# Patient Record
Sex: MALE | Race: WHITE | Employment: OTHER | URBAN - METROPOLITAN AREA
[De-identification: names, ages, dates, MRNs, and addresses within clinical notes are randomized per-mention and may not be internally consistent; named-entity substitution may affect disease eponyms.]

---

## 2017-01-05 ENCOUNTER — TELEPHONE (OUTPATIENT)
Dept: FAMILY MEDICINE | Facility: CLINIC | Age: 62
End: 2017-01-05

## 2017-01-05 DIAGNOSIS — E03.9 HYPOTHYROIDISM, UNSPECIFIED TYPE: Primary | ICD-10-CM

## 2017-01-06 RX ORDER — LEVOTHYROXINE SODIUM 75 UG/1
TABLET ORAL
Qty: 30 TABLET | Refills: 0 | Status: SHIPPED | OUTPATIENT
Start: 2017-01-06 | End: 2017-01-31

## 2017-01-06 NOTE — TELEPHONE ENCOUNTER
Levothyroxine     Last Written Prescription Date: 12/17/2015  Last Quantity: 90, # refills: 1  Last Office Visit with G, P or [facility] prescribing provider: 01/28/2016        TSH   Date Value Ref Range Status   05/26/2015 6.50* 0.40 - 4.00 mU/L Final     Lilian Carrasquillo MA

## 2017-01-06 NOTE — TELEPHONE ENCOUNTER
Routing refill request to provider for review/approval because:  Labs out of range:  TSH  Labs not current:  TSH  A break in medication  Patient needs to be seen because it has been more than 1 year since last office visit.  Bharti Torres RN

## 2017-01-30 NOTE — PROGRESS NOTES
"  SUBJECTIVE:                                                    Martin Armstrong is a 61 year old male who presents to clinic today for the following health issues:        Depression and Anxiety Follow-Up    Status since last visit: No change    Other associated symptoms:None    Complicating factors:     Significant life event: No     Current substance abuse: None    PHQ-9 SCORE 5/26/2015 6/29/2015 12/17/2015   Total Score 5 10 -   Total Score - - 5     MINA-7 SCORE 5/26/2015 6/29/2015 12/17/2015   Total Score 5 3 -   Total Score - - 6        PHQ-9  English      PHQ-9   Any Language     GAD7       Hypothyroidism Follow-up      Since last visit, patient describes the following symptoms: Weight stable, no hair loss, no skin changes, no constipation, no loose stools       Amount of exercise or physical activity: 2-3 days/week for an average of 15-30 minutes    Problems taking medications regularly: No    Medication side effects: none    Diet: regular (no restrictions)    He is looking at FDC options. Sees outside provider for Suboxone, and is trying to wean off this. Feels life is going fairly well. BP is a bit elevated with first reading. Denies cardiac symptoms.     Due for screening labs.     Problem list and histories reviewed & adjusted, as indicated.  Additional history: as documented    Problem list, Medication list, Allergies, and Medical/Social/Surgical histories reviewed in EPIC and updated as appropriate.    ROS:  Constitutional, neuro, ENT, endocrine, pulmonary, cardiac, gastrointestinal, genitourinary, musculoskeletal, integument and psychiatric systems are negative, except as otherwise noted.    OBJECTIVE:                                                    /80 mmHg  Pulse 86  Temp(Src) 97.5  F (36.4  C) (Temporal)  Resp 14  Ht 5' 9.5\" (1.765 m)  Wt 193 lb 8 oz (87.771 kg)  BMI 28.17 kg/m2  Body mass index is 28.17 kg/(m^2).  GENERAL APPEARANCE: healthy, alert and no distress  EYES: Eyes " grossly normal to inspection and conjunctivae and sclerae normal  HENT: ear canals and TM's normal and nose and mouth without ulcers or lesions  NECK: no adenopathy, no asymmetry, masses, or scars and thyroid normal to palpation  RESP: lungs clear to auscultation - no rales, rhonchi or wheezes  CV: regular rates and rhythm, normal S1 S2, no S3 or S4 and no murmur, click or rub  NEURO: Normal strength and tone, mentation intact and speech normal  PSYCH: mentation appears normal and affect normal/bright         ASSESSMENT/PLAN:                                                        ICD-10-CM    1. Hypothyroidism, unspecified type E03.9 TSH with free T4 reflex     T4 free     levothyroxine (SYNTHROID/LEVOTHROID) 88 MCG tablet     TSH with free T4 reflex     DISCONTINUED: levothyroxine (SYNTHROID/LEVOTHROID) 75 MCG tablet   2. Major depressive disorder, recurrent episode, moderate (H) F33.1 citalopram (CELEXA) 40 MG tablet   3. Advanced directives, counseling/discussion Z71.89    4. Lipid screening Z13.220 Lipid panel reflex to direct LDL   5. Encounter for screening examination for impaired glucose regulation and diabetes mellitus Z13.1 Glucose   6. Need for hepatitis C screening test Z11.59 Hepatitis C antibody   7. Screening for prostate cancer Z12.5 Prostate spec antigen screen   8. Need for prophylactic vaccination and inoculation against influenza Z23 FLU VAC, SPLIT VIRUS IM > 3 YO (QUADRIVALENT) [11930]     Vaccine Administration, Initial [75098]       Continue same medication doses. Discussed BP control and medications Weight loss, and exercise with healthy nutrition discussed.   Routine labs. Discussed HCM.   Future labs  Meds refilled. Discussed seborrheic moles. Return to clinic with any new or worsening symptoms, and as needed.     BETH Cook [facility] ERVIN  Injectable Influenza Immunization Documentation    1.  Is the person to be vaccinated sick today?  No    2. Does the person to be  vaccinated have an allergy to eggs or to a component of the vaccine?  No    3. Has the person to be vaccinated today ever had a serious reaction to influenza vaccine in the past?  No    4. Has the person to be vaccinated ever had Guillain-[place] syndrome?  No     Form completed by Jaquelin Weber CMA ([facility])

## 2017-01-31 ENCOUNTER — OFFICE VISIT (OUTPATIENT)
Dept: FAMILY MEDICINE | Facility: CLINIC | Age: 62
End: 2017-01-31
Payer: COMMERCIAL

## 2017-01-31 VITALS
HEART RATE: 86 BPM | DIASTOLIC BLOOD PRESSURE: 80 MMHG | WEIGHT: 193.5 LBS | BODY MASS INDEX: 27.7 KG/M2 | HEIGHT: 70 IN | TEMPERATURE: 97.5 F | RESPIRATION RATE: 14 BRPM | SYSTOLIC BLOOD PRESSURE: 136 MMHG

## 2017-01-31 DIAGNOSIS — E03.9 HYPOTHYROIDISM, UNSPECIFIED TYPE: Primary | ICD-10-CM

## 2017-01-31 DIAGNOSIS — Z71.89 ADVANCED DIRECTIVES, COUNSELING/DISCUSSION: ICD-10-CM

## 2017-01-31 DIAGNOSIS — Z13.1 ENCOUNTER FOR SCREENING EXAMINATION FOR IMPAIRED GLUCOSE REGULATION AND DIABETES MELLITUS: ICD-10-CM

## 2017-01-31 DIAGNOSIS — Z23 NEED FOR PROPHYLACTIC VACCINATION AND INOCULATION AGAINST INFLUENZA: ICD-10-CM

## 2017-01-31 DIAGNOSIS — Z13.220 LIPID SCREENING: ICD-10-CM

## 2017-01-31 DIAGNOSIS — Z12.5 SCREENING FOR PROSTATE CANCER: ICD-10-CM

## 2017-01-31 DIAGNOSIS — Z11.59 NEED FOR HEPATITIS C SCREENING TEST: ICD-10-CM

## 2017-01-31 DIAGNOSIS — F33.1 MAJOR DEPRESSIVE DISORDER, RECURRENT EPISODE, MODERATE (H): ICD-10-CM

## 2017-01-31 LAB
PSA SERPL-ACNC: 0.22 UG/L (ref 0–4)
T4 FREE SERPL-MCNC: 0.79 NG/DL (ref 0.76–1.46)
TSH SERPL DL<=0.005 MIU/L-ACNC: 9.54 MU/L (ref 0.4–4)

## 2017-01-31 PROCEDURE — 84439 ASSAY OF FREE THYROXINE: CPT | Performed by: NURSE PRACTITIONER

## 2017-01-31 PROCEDURE — 84443 ASSAY THYROID STIM HORMONE: CPT | Performed by: NURSE PRACTITIONER

## 2017-01-31 PROCEDURE — 90686 IIV4 VACC NO PRSV 0.5 ML IM: CPT | Performed by: NURSE PRACTITIONER

## 2017-01-31 PROCEDURE — 86803 HEPATITIS C AB TEST: CPT | Performed by: NURSE PRACTITIONER

## 2017-01-31 PROCEDURE — 36415 COLL VENOUS BLD VENIPUNCTURE: CPT | Performed by: NURSE PRACTITIONER

## 2017-01-31 PROCEDURE — G0103 PSA SCREENING: HCPCS | Performed by: NURSE PRACTITIONER

## 2017-01-31 PROCEDURE — 90471 IMMUNIZATION ADMIN: CPT | Performed by: NURSE PRACTITIONER

## 2017-01-31 PROCEDURE — 99214 OFFICE O/P EST MOD 30 MIN: CPT | Mod: 25 | Performed by: NURSE PRACTITIONER

## 2017-01-31 RX ORDER — BUPRENORPHINE HYDROCHLORIDE AND NALOXONE HYDROCHLORIDE DIHYDRATE 8; 2 MG/1; MG/1
TABLET SUBLINGUAL
COMMUNITY
Start: 2016-12-20

## 2017-01-31 RX ORDER — CITALOPRAM HYDROBROMIDE 40 MG/1
40 TABLET ORAL DAILY
Qty: 90 TABLET | Refills: 3 | Status: SHIPPED | OUTPATIENT
Start: 2017-01-31 | End: 2018-03-05

## 2017-01-31 RX ORDER — LEVOTHYROXINE SODIUM 75 UG/1
75 TABLET ORAL DAILY
Qty: 90 TABLET | Refills: 3 | Status: SHIPPED | OUTPATIENT
Start: 2017-01-31 | End: 2017-02-01 | Stop reason: DRUGHIGH

## 2017-01-31 ASSESSMENT — ANXIETY QUESTIONNAIRES
5. BEING SO RESTLESS THAT IT IS HARD TO SIT STILL: NOT AT ALL
GAD7 TOTAL SCORE: 5
1. FEELING NERVOUS, ANXIOUS, OR ON EDGE: SEVERAL DAYS
6. BECOMING EASILY ANNOYED OR IRRITABLE: SEVERAL DAYS
IF YOU CHECKED OFF ANY PROBLEMS ON THIS QUESTIONNAIRE, HOW DIFFICULT HAVE THESE PROBLEMS MADE IT FOR YOU TO DO YOUR WORK, TAKE CARE OF THINGS AT HOME, OR GET ALONG WITH OTHER PEOPLE: SOMEWHAT DIFFICULT
2. NOT BEING ABLE TO STOP OR CONTROL WORRYING: SEVERAL DAYS
7. FEELING AFRAID AS IF SOMETHING AWFUL MIGHT HAPPEN: SEVERAL DAYS
3. WORRYING TOO MUCH ABOUT DIFFERENT THINGS: SEVERAL DAYS

## 2017-01-31 ASSESSMENT — PATIENT HEALTH QUESTIONNAIRE - PHQ9: 5. POOR APPETITE OR OVEREATING: NOT AT ALL

## 2017-01-31 NOTE — Clinical Note
My Depression Action Plan  Name: Martin Armstrong   Date of Birth 1955  Date: 1/30/2017    My doctor: Nancy Sen   My clinic: [facility]  [address] [place] [address], [address]  [place] [id number] [phone]          GREEN    ZONE   Good Control    What it looks like:     Things are going generally well. You have normal up s and down s. You may even feel depressed from time to time, but bad moods usually last less than a day.   What you need to do:  1. Continue to care for yourself (see self care plan)  2. Check your depression survival kit and update it as needed  3. Follow your physician s recommendations including any medication.  4. Do not stop taking medication unless you consult with your physician first.           YELLOW         ZONE Getting Worse    What it looks like:     Depression is starting to interfere with your life.     It may be hard to get out of bed; you may be starting to isolate yourself from others.    Symptoms of depression are starting to last most all day and this has happened for several days.     You may have suicidal thoughts but they are not constant.   What you need to do:     1. Call your care team, your response to treatment will improve if you keep your care team informed of your progress. Yellow periods are signs an adjustment may need to be made.     2. Continue your self-care, even if you have to fake it!    3. Talk to someone in your support network    4. Open up your depression survival kit           RED    ZONE Medical Alert - Get Help    What it looks like:     Depression is seriously interfering with your life.     You may experience these or other symptoms: You can t get out of bed most days, can t work or engage in other necessary activities, you have trouble taking care of basic hygiene, or basic responsibilities, thoughts of suicide or death that will not go away, self-injurious behavior.     What you need to do:  1. Call your care team  and request a same-day appointment. If they are not available (weekends or after hours) call your local crisis line, emergency room or 911.      Electronically signed by: Jaquelin Weber, January 30, 2017    Depression Self Care Plan / Survival Kit    Self-Care for Depression  Here s the deal. Your body and mind are really not as separate as most people think.  What you do and think affects how you feel and how you feel influences what you do and think. This means if you do things that people who feel good do, it will help you feel better.  Sometimes this is all it takes.  There is also a place for medication and therapy depending on how severe your depression is, so be sure to consult with your medical provider and/ or Behavioral Health Consultant if your symptoms are worsening or not improving.     In order to better manage my stress, I will:    Exercise  Get some form of exercise, every day. This will help reduce pain and release endorphins, the  feel good  chemicals in your brain. This is almost as good as taking antidepressants!  This is not the same as joining a gym and then never going! (they count on that by the way ) It can be as simple as just going for a walk or doing some gardening, anything that will get you moving.      Hygiene   Maintain good hygiene (Get out of bed in the morning, Make your bed, Brush your teeth, Take a shower, and Get dressed like you were going to work, even if you are unemployed).  If your clothes don't fit try to get ones that do.    Diet  I will strive to eat foods that are good for me, drink plenty of water, and avoid excessive sugar, caffeine, alcohol, and other mood-altering substances.  Some foods that are helpful in depression are: complex carbohydrates, B vitamins, flaxseed, fish or fish oil, fresh fruits and vegetables.    Psychotherapy  I agree to participate in Individual Therapy (if recommended).    Medication  If prescribed medications, I agree to take them.  Missing  doses can result in serious side effects.  I understand that drinking alcohol, or other illicit drug use, may cause potential side effects.  I will not stop my medication abruptly without first discussing it with my provider.    Staying Connected With Others  I will stay in touch with my friends, family members, and my primary care provider/team.    Use your imagination  Be creative.  We all have a creative side; it doesn t matter if it s oil painting, sand castles, or mud pies! This will also kick up the endorphins.    Witness Beauty  (AKA stop and smell the roses) Take a look outside, even in mid-winter. Notice colors, textures. Watch the squirrels and birds.     Service to others  Be of service to others.  There is always someone else in need.  By helping others we can  get out of ourselves  and remember the really important things.  This also provides opportunities for practicing all the other parts of the program.    Humor  Laugh and be silly!  Adjust your TV habits for less news and crime-drama and more comedy.    Control your stress  Try breathing deep, massage therapy, biofeedback, and meditation. Find time to relax each day.     My support system    Clinic Contact:  Phone number:    Contact 1:  Phone number:    Contact 2:  Phone number:    [employer]:  Phone number:    Therapist:  Phone number:    Local crisis center:    Phone number:    Other community support:  Phone number:

## 2017-01-31 NOTE — NURSING NOTE
Prior to injection verified patient identity using patient's name and date of birth.  Per orders of Nancy Sen DNP, injection of influenza given by Jaquelin Weber. Patient instructed to remain in clinic for 20 minutes afterwards, and to report any adverse reaction to me immediately.

## 2017-01-31 NOTE — NURSING NOTE
"Chief Complaint   Patient presents with     Thyroid Problem     Panel Management     Flu Shot, TSH, Honoring Choices, Hep C Screening, DAP, PHQ-9/MINA       Initial /86 mmHg  Pulse 86  Temp(Src) 97.5  F (36.4  C) (Temporal)  Resp 14  Ht 5' 9.5\" (1.765 m)  Wt 193 lb 8 oz (87.771 kg)  BMI 28.17 kg/m2 Estimated body mass index is 28.17 kg/(m^2) as calculated from the following:    Height as of this encounter: 5' 9.5\" (1.765 m).    Weight as of this encounter: 193 lb 8 oz (87.771 kg).  BP completed using cuff size: SMA Jolene    "

## 2017-02-01 LAB — HCV AB SERPL QL IA: NORMAL

## 2017-02-01 RX ORDER — LEVOTHYROXINE SODIUM 88 UG/1
88 TABLET ORAL DAILY
Qty: 90 TABLET | Refills: 0 | Status: SHIPPED | OUTPATIENT
Start: 2017-02-01 | End: 2017-05-08

## 2017-02-01 ASSESSMENT — PATIENT HEALTH QUESTIONNAIRE - PHQ9: SUM OF ALL RESPONSES TO PHQ QUESTIONS 1-9: 6

## 2017-02-01 ASSESSMENT — ANXIETY QUESTIONNAIRES: GAD7 TOTAL SCORE: 5

## 2017-02-06 DIAGNOSIS — Z13.1 ENCOUNTER FOR SCREENING EXAMINATION FOR IMPAIRED GLUCOSE REGULATION AND DIABETES MELLITUS: ICD-10-CM

## 2017-02-06 DIAGNOSIS — E03.9 HYPOTHYROIDISM, UNSPECIFIED TYPE: ICD-10-CM

## 2017-02-06 DIAGNOSIS — Z13.220 LIPID SCREENING: ICD-10-CM

## 2017-02-06 LAB
CHOLEST SERPL-MCNC: 154 MG/DL
GLUCOSE SERPL-MCNC: 93 MG/DL (ref 70–99)
HDLC SERPL-MCNC: 43 MG/DL
LDLC SERPL CALC-MCNC: 76 MG/DL
NONHDLC SERPL-MCNC: 111 MG/DL
T4 FREE SERPL-MCNC: NORMAL NG/DL (ref 0.76–1.46)
TRIGL SERPL-MCNC: 177 MG/DL
TSH SERPL DL<=0.005 MIU/L-ACNC: NORMAL MU/L (ref 0.4–4)

## 2017-02-06 PROCEDURE — 36415 COLL VENOUS BLD VENIPUNCTURE: CPT | Performed by: NURSE PRACTITIONER

## 2017-02-06 PROCEDURE — 82947 ASSAY GLUCOSE BLOOD QUANT: CPT | Performed by: NURSE PRACTITIONER

## 2017-02-06 PROCEDURE — 80061 LIPID PANEL: CPT | Performed by: NURSE PRACTITIONER

## 2017-06-18 DIAGNOSIS — E03.9 HYPOTHYROIDISM, UNSPECIFIED TYPE: ICD-10-CM

## 2017-06-19 NOTE — TELEPHONE ENCOUNTER
Levothyroxine     Last Written Prescription Date: 5/09/2017  Last Quantity: 30, # refills: 0  Last Office Visit with G, [facility] or [facility] prescribing provider: 1/31/2017        TSH   Date Value Ref Range Status   02/06/2017  0.40 - 4.00 mU/L Corrected    Canceled, Test credited   Test canceled by physician  CORRECTED ON 02/06 AT 1146: PREVIOUSLY REPORTED AS 15.48

## 2017-06-19 NOTE — TELEPHONE ENCOUNTER
Routing refill request to provider for review/approval because:  Nicolette given x1 and patient did not follow up  Labs out of range:  TSH    Bren Robins, RN, BSN

## 2017-06-20 RX ORDER — LEVOTHYROXINE SODIUM 88 UG/1
TABLET ORAL
Qty: 30 TABLET | Refills: 0 | Status: SHIPPED | OUTPATIENT
Start: 2017-06-20 | End: 2017-06-29 | Stop reason: DRUGHIGH

## 2017-06-26 NOTE — PROGRESS NOTES
SUBJECTIVE:                                                    Martin Armstrong is a 61 year old male who presents to clinic today for the following health issues:      Depression Followup    Status since last visit: Stable     See PHQ-9 for current symptoms.  Other associated symptoms: None    Complicating factors:   Significant life event:  No   Current substance abuse:  None  Anxiety or Panic symptoms:  No    PHQ-9  English  PHQ-9   Any Language  Hypothyroidism Follow-up    Since last visit, patient describes the following symptoms: Weight stable, no hair loss, no skin changes, no constipation, no loose stools    Amount of exercise or physical activity: None    Problems taking medications regularly: Yes,  problems remembering to take and Every once in a while forgets to take the Synthroid.    Medication side effects: none    Diet: regular (no restrictions)    Patient states that he has been busy working 50 hours a week as a , specifically 10 hours a day.     He relates that he has reduced sugar and fat in his diet. He has stopped drinking juice because he wants to reduce his intake of High Fructose Corn Syrup. He is working towards healthier eating and drinking habits.He relates that he is trying to increase his activity at home through working out in the yard. He notes that he drank chocolate milk and ate an apple fritter this morning.     Triglycerides   Date Value Ref Range Status   02/06/2017 177 (H) <150 mg/dL Final     Comment:     Borderline high:  150-199 mg/dl   High:             200-499 mg/dl   Very high:       >499 mg/dl   Fasting specimen         He is interested in having a test to see if he has plaque buildup in his arteries. He is open to going to trying a CT Heart scan. He notes that he gets chest pain.    Patient states that he is having trouble getting enough sleep. He stays busy on the weekends with her grandchildren.             Problem list and histories reviewed & adjusted, as  indicated.  Additional history: as documented    Patient Active Problem List   Diagnosis     CARDIOVASCULAR SCREENING; LDL GOAL LESS THAN 160     Chemical dependency (H)     DJD (degenerative joint disease) of knee     Abnormal gait     Advanced directives, counseling/discussion     Diverticulosis     Hypothyroidism     Major depressive disorder     Osteoarthritis of left hand     Past Surgical History:   Procedure Laterality Date     ARTHROPLASTY KNEE UNICOMPARTMENT  11/8/2011    Procedure:ARTHROPLASTY KNEE UNICOMPARTMENT; LEFT KNEE UNICOMPARTMENT ARTHROPLASTY (FREEMAN)^; Surgeon:SHABBIR RADER; Location:[facility] OR     ARTHROSCOPY KNEE RT/LT  1/2006    left     COLONOSCOPY  3/27/08    due in 2013       Social History   Substance Use Topics     Smoking status: Never Smoker     Smokeless tobacco: Never Used     Alcohol use No     Family History   Problem Relation Age of Onset     Family History Negative Father      CANCER Mother      Eye Disorder Paternal Grandmother      Asthma No family hx of      C.A.D. No family hx of      DIABETES No family hx of      Hypertension No family hx of      CEREBROVASCULAR DISEASE No family hx of      Breast Cancer No family hx of      Cancer - colorectal No family hx of      Prostate Cancer No family hx of          Current Outpatient Prescriptions   Medication Sig Dispense Refill     levothyroxine (SYNTHROID/LEVOTHROID) 100 MCG tablet Take 1 tablet (100 mcg) by mouth daily 90 tablet 0     citalopram (CELEXA) 40 MG tablet Take 1 tablet (40 mg) by mouth daily 90 tablet 3     ASTAXANTHIN PO        buprenorphine-naloxone (SUBOXONE) 8-2 MG SUBL Place 1 tablet under the tongue 2 times daily. 60 each 0     buprenorphine-naloxone (SUBOXONE) 8-2 MG SUBL sublingual tablet        cyclobenzaprine (FLEXERIL) 10 MG tablet Take 0.5-1 tablets (5-10 mg) by mouth 3 times daily as needed for muscle spasms (Patient not taking: Reported on 6/29/2017) 30 tablet 0     omeprazole 20 MG tablet Take 1 tablet  "(20 mg) by mouth daily Take 30-60 minutes before a meal. (Patient not taking: Reported on 6/29/2017) 90 tablet 1     chlorhexidine (PERIDEX) 0.12 % solution   2     Multiple Vitamins-Minerals (MULTIVITAL PO)          Reviewed and updated as needed this visit by clinical staff  Tobacco  Allergies  Med Hx  Surg Hx  Fam Hx  Soc Hx      Reviewed and updated as needed this visit by Provider         ROS:  Constitutional, neuro, ENT, endocrine, pulmonary, cardiac, gastrointestinal, genitourinary, musculoskeletal, integument and psychiatric systems are negative, except as otherwise noted.    This document serves as a record of the services and decisions personally performed and made by Nancy Sen DNP. It was created on her behalf by Alma Rosa Boyce, a trained medical scribe. The creation of this document is based on the provider's statements to the medical scribe.  Alma Rosa Boyce 8:38 AM June 29, 2017      OBJECTIVE:                                                    /80  Pulse 76  Temp 98.2  F (36.8  C) (Temporal)  Resp 20  Ht 5' 9.29\" (1.76 m)  Wt 188 lb 4.8 oz (85.4 kg)  BMI 27.57 kg/m2  Body mass index is 27.57 kg/(m^2).  GENERAL APPEARANCE: healthy, alert and no distress  HENT: ear canals and TM's normal and nose and mouth without ulcers or lesions  NECK: no adenopathy, no asymmetry, masses, or scars and thyroid normal to palpation  RESP: lungs clear to auscultation - no rales, rhonchi or wheezes  CV: regular rates and rhythm, normal S1 S2, no S3 or S4 and no murmur, click or rub  NEURO: Normal strength and tone, mentation intact and speech normal  PSYCH: mentation appears normal and affect normal/bright    Diagnostic test results:  Diagnostic Test Results:  none      ASSESSMENT/PLAN:                                                        ICD-10-CM    1. Moderate episode of recurrent major depressive disorder (H) F33.1    2. Hypothyroidism, unspecified type E03.9 TSH with free T4 reflex     T4 free     " levothyroxine (SYNTHROID/LEVOTHROID) 100 MCG tablet     TSH with free T4 reflex     Follow up with dosage after lab results. Order placed for labs that the patient will complete today.    Med dosing based on labs.     Referral to CT Heart scan. Asymtpmatic for chest pain. Discussed process and screening.     Occ. Feeling uninterested in activities. Does not want to go up on medication. No SI. Discussed thyroid and mood, counseling- if wanting- pt. Declining this. If mood is worse advise follow-up.     Follow up with Provider - 6-12 months.    The information in this document, created by the medical scribe for me, accurately reflects the services I personally performed and the decisions made by me. I have reviewed and approved this document for accuracy prior to leaving the patient care area.  June 29, 2017 9:06 AM    BETH Cook [facility]

## 2017-06-29 ENCOUNTER — OFFICE VISIT (OUTPATIENT)
Dept: FAMILY MEDICINE | Facility: CLINIC | Age: 62
End: 2017-06-29
Payer: COMMERCIAL

## 2017-06-29 DIAGNOSIS — F33.1 MODERATE EPISODE OF RECURRENT MAJOR DEPRESSIVE DISORDER (H): Primary | ICD-10-CM

## 2017-06-29 DIAGNOSIS — E03.9 HYPOTHYROIDISM, UNSPECIFIED TYPE: ICD-10-CM

## 2017-06-29 LAB
T4 FREE SERPL-MCNC: 0.92 NG/DL (ref 0.76–1.46)
TSH SERPL DL<=0.005 MIU/L-ACNC: 8.76 MU/L (ref 0.4–4)

## 2017-06-29 PROCEDURE — 99214 OFFICE O/P EST MOD 30 MIN: CPT | Performed by: NURSE PRACTITIONER

## 2017-06-29 PROCEDURE — 84439 ASSAY OF FREE THYROXINE: CPT | Performed by: NURSE PRACTITIONER

## 2017-06-29 PROCEDURE — 36415 COLL VENOUS BLD VENIPUNCTURE: CPT | Performed by: NURSE PRACTITIONER

## 2017-06-29 PROCEDURE — 84443 ASSAY THYROID STIM HORMONE: CPT | Performed by: NURSE PRACTITIONER

## 2017-06-29 RX ORDER — LEVOTHYROXINE SODIUM 100 UG/1
100 TABLET ORAL DAILY
Qty: 90 TABLET | Refills: 0 | Status: SHIPPED | OUTPATIENT
Start: 2017-06-29 | End: 2017-10-28

## 2017-06-29 ASSESSMENT — ANXIETY QUESTIONNAIRES
5. BEING SO RESTLESS THAT IT IS HARD TO SIT STILL: NOT AT ALL
4. TROUBLE RELAXING: NOT AT ALL
7. FEELING AFRAID AS IF SOMETHING AWFUL MIGHT HAPPEN: NOT AT ALL
GAD7 TOTAL SCORE: 2
2. NOT BEING ABLE TO STOP OR CONTROL WORRYING: NOT AT ALL
1. FEELING NERVOUS, ANXIOUS, OR ON EDGE: NOT AT ALL
GAD7 TOTAL SCORE: 2
3. WORRYING TOO MUCH ABOUT DIFFERENT THINGS: SEVERAL DAYS
6. BECOMING EASILY ANNOYED OR IRRITABLE: SEVERAL DAYS
7. FEELING AFRAID AS IF SOMETHING AWFUL MIGHT HAPPEN: NOT AT ALL
GAD7 TOTAL SCORE: 2

## 2017-06-29 ASSESSMENT — PATIENT HEALTH QUESTIONNAIRE - PHQ9
10. IF YOU CHECKED OFF ANY PROBLEMS, HOW DIFFICULT HAVE THESE PROBLEMS MADE IT FOR YOU TO DO YOUR WORK, TAKE CARE OF THINGS AT HOME, OR GET ALONG WITH OTHER PEOPLE: SOMEWHAT DIFFICULT
SUM OF ALL RESPONSES TO PHQ QUESTIONS 1-9: 6
SUM OF ALL RESPONSES TO PHQ QUESTIONS 1-9: 6

## 2017-06-29 ASSESSMENT — PAIN SCALES - GENERAL: PAINLEVEL: NO PAIN (0)

## 2017-06-29 NOTE — NURSING NOTE
"Chief Complaint   Patient presents with     Thyroid Problem     Depression     Panel Management     PHQ-9/MINA       Initial /84  Pulse 76  Temp 98.2  F (36.8  C) (Temporal)  Resp 20  Ht 5' 9.29\" (1.76 m)  Wt 188 lb 4.8 oz (85.4 kg)  BMI 27.57 kg/m2 Estimated body mass index is 27.57 kg/(m^2) as calculated from the following:    Height as of this encounter: 5' 9.29\" (1.76 m).    Weight as of this encounter: 188 lb 4.8 oz (85.4 kg).  Medication Reconciliation: complete     Natalie Laguerre CMA  "

## 2017-06-29 NOTE — MR AVS SNAPSHOT
After Visit Summary   6/29/2017    Martin Armstrong    MRN: [id number]           Patient Information     Date Of Birth          1955        Visit Information        Provider Department      6/29/2017 8:20 AM Nancy Sen APRN CNP [facility] [place]        Today's Diagnoses     Moderate episode of recurrent major depressive disorder (H)    -  1    Hypothyroidism, unspecified type           Follow-ups after your visit        Future tests that were ordered for you today     Open Future Orders        Priority Expected Expires Ordered    TSH with free T4 reflex Routine 8/28/2017 10/27/2017 6/29/2017            Who to contact     If you have questions or need follow up information about today's clinic visit or your schedule please contact [facility] at [phone].  Normal or non-critical lab and imaging results will be communicated to you by EduKarthart, letter or phone within 4 business days after the clinic has received the results. If you do not hear from us within 7 days, please contact the clinic through EduKarthart or phone. If you have a critical or abnormal lab result, we will notify you by phone as soon as possible.  Submit refill requests through [employer] or call your pharmacy and they will forward the refill request to us. Please allow 3 business days for your refill to be completed.          Additional Information About Your Visit        MyChart Information     [employer] gives you secure access to your electronic health record. If you see a primary care provider, you can also send messages to your care team and make appointments. If you have questions, please call your primary care clinic.  If you do not have a primary care provider, please call [phone] and they will assist you.        Care EveryWhere ID     This is your Care EveryWhere ID. This could be used by other organizations to access your [facility] medical records  [id number]        Your Vitals Were     Pulse  "Temperature Respirations Height BMI (Body Mass Index)       76 98.2  F (36.8  C) (Temporal) 20 5' 9.29\" (1.76 m) 27.57 kg/m2        Blood Pressure from Last 3 Encounters:   06/29/17 132/80   01/31/17 136/80   01/28/16 148/84    Weight from Last 3 Encounters:   06/29/17 188 lb 4.8 oz (85.4 kg)   01/31/17 193 lb 8 oz (87.8 kg)   01/28/16 183 lb 14.4 oz (83.4 kg)              We Performed the Following     T4 free     TSH with free T4 reflex          Today's Medication Changes          These changes are accurate as of: 6/29/17 11:59 PM.  If you have any questions, ask your nurse or doctor.               These medicines have changed or have updated prescriptions.        Dose/Directions    levothyroxine 100 MCG tablet   Commonly known as:  SYNTHROID/LEVOTHROID   This may have changed:  See the new instructions.   Used for:  Hypothyroidism, unspecified type   Changed by:  Nancy Sen APRN CNP        Dose:  100 mcg   Take 1 tablet (100 mcg) by mouth daily   Quantity:  90 tablet   Refills:  0            Where to get your medicines      These medications were sent to [facility] [address] [place] [address] [address] [address]     Phone:  [phone]     levothyroxine 100 MCG tablet                Primary Care Provider Office Phone # Fax #    BETH Treadwell [phone]       [facility] [id number] [place] [address]        Equal Access to Services     NASRIN HARO AH: Hadoswaldo almarazo Soselena, waaxda luqadaha, qaybta kaalmada adeegyada, sabrina galvez. So [facility] [phone].    ATENCIÓN: Si habla español, tiene a ba disposición servicios gratuitos de asistencia lingüística. Llame al [phone].    We comply with applicable federal civil rights laws and [place] laws. We do not discriminate on the basis of race, color, national origin, age, disability sex, sexual orientation or gender identity.            Thank " you!     Thank you for choosing [facility]  for your care. Our goal is always to provide you with excellent care. Hearing back from our patients is one way we can continue to improve our services. Please take a few minutes to complete the written survey that you may receive in the mail after your visit with us. Thank you!             Your Updated Medication List - Protect others around you: Learn how to safely use, store and throw away your medicines at www.disposemymeds.org.          This list is accurate as of: 6/29/17 11:59 PM.  Always use your most recent med list.                   Brand Name Dispense Instructions for use Diagnosis    ASTAXANTHIN PO           chlorhexidine 0.12 % solution    PERIDEX          citalopram 40 MG tablet    celeXA    90 tablet    Take 1 tablet (40 mg) by mouth daily    Major depressive disorder, recurrent episode, moderate (H)       cyclobenzaprine 10 MG tablet    FLEXERIL    30 tablet    Take 0.5-1 tablets (5-10 mg) by mouth 3 times daily as needed for muscle spasms    Sacroiliac joint dysfunction, Left-sided low back pain without sciatica       levothyroxine 100 MCG tablet    SYNTHROID/LEVOTHROID    90 tablet    Take 1 tablet (100 mcg) by mouth daily    Hypothyroidism, unspecified type       MULTIVITAL PO           omeprazole 20 MG tablet     90 tablet    Take 1 tablet (20 mg) by mouth daily Take 30-60 minutes before a meal.    Epigastric pain       * SUBOXONE 8-2 MG Subl sublingual tablet   Generic drug:  buprenorphine-naloxone     60 each    Place 1 tablet under the tongue 2 times daily.    Chemical dependency (H)       * buprenorphine-naloxone 8-2 MG Subl sublingual tablet    SUBOXONE          * Notice:  This list has 2 medication(s) that are the same as other medications prescribed for you. Read the directions carefully, and ask your doctor or other care provider to review them with you.

## 2017-06-30 VITALS
BODY MASS INDEX: 27.89 KG/M2 | HEIGHT: 69 IN | RESPIRATION RATE: 20 BRPM | TEMPERATURE: 98.2 F | WEIGHT: 188.3 LBS | DIASTOLIC BLOOD PRESSURE: 80 MMHG | HEART RATE: 76 BPM | SYSTOLIC BLOOD PRESSURE: 132 MMHG

## 2017-06-30 ASSESSMENT — PATIENT HEALTH QUESTIONNAIRE - PHQ9: SUM OF ALL RESPONSES TO PHQ QUESTIONS 1-9: 6

## 2017-06-30 ASSESSMENT — ANXIETY QUESTIONNAIRES: GAD7 TOTAL SCORE: 2

## 2017-10-28 DIAGNOSIS — E03.9 HYPOTHYROIDISM, UNSPECIFIED TYPE: ICD-10-CM

## 2017-10-31 RX ORDER — LEVOTHYROXINE SODIUM 100 UG/1
TABLET ORAL
Qty: 90 TABLET | Refills: 0 | Status: SHIPPED | OUTPATIENT
Start: 2017-10-31 | End: 2017-12-20 | Stop reason: DRUGHIGH

## 2017-10-31 NOTE — TELEPHONE ENCOUNTER
Synthroid  Routing refill request to provider for review/approval because:  Labs out of range:  TSH    TSH   Date Value Ref Range Status   06/29/2017 8.76 (H) 0.40 - 4.00 mU/L Final   ]  Bren Robins, RN, BSN

## 2017-11-22 ENCOUNTER — TELEPHONE (OUTPATIENT)
Dept: FAMILY MEDICINE | Facility: CLINIC | Age: 62
End: 2017-11-22

## 2017-11-22 NOTE — TELEPHONE ENCOUNTER
Panel Management Review      Patient has the following on his problem list:     Depression / Dysthymia review    Measure:  Needs PHQ-9 score of 4 or less during index window.  Administer PHQ-9 and if score is 5 or more, send encounter to provider for next steps.    5 - 7 month window range: 6    PHQ-9 SCORE 12/17/2015 1/31/2017 6/29/2017   Total Score - - -   Total Score MyChart - - 6 (Mild depression)   Total Score 5 6 6       If PHQ-9 recheck is 5 or more, route to provider for next steps.    Patient is due for:  None        Composite cancer screening  Chart review shows that this patient is due/due soon for the following None  Summary:    Patient is due/failing the following:   TSH with Reflex to T4    Action needed:   Patient needs non-fasting lab only appointment    Type of outreach:    Phone, left message for patient to call back.     Questions for provider review:    None                                                                                                                                    Amina Jiménez [facility]     Chart routed to Care Team .

## 2017-12-18 NOTE — PROGRESS NOTES
SUBJECTIVE:                                                    Martin Armstrong is a 62 year old male who presents to clinic today for the following health issues:    HPI    Concern - L knee pain   Onset: 10 days    Description:   Started with sudden swelling of the Left knee, with ice the swelling has decreased. Pain from the knee to mid shin on the outside. No bruising but calf/shin feels VERY tender.   Cannot bend the way he usually can without pain. Did have a partial replacement to this knee approximately 8 years ago.     Intensity: moderate    Progression of Symptoms:  improving and same    Therapies Tried and outcome: icing the knee everything, knee brace (last week)    The patient reports that his left knee, which he had a partial knee replacement, swelled up 10 day ago. The swelling has since gone down but he now has pain in the left knee but has a throbbing pain in his left leg and foot.     The patient has tried 2 different sleeves but they compression seemed too tight to the point that it is painful.     He takes naproxen which seems to alleviate his pain somewhat.     MS: On his right middle finger the patient reports a nodule at the PIP joint level. He also reports bilateral MP joint stiffness and soreness. He reports bumping his hand in these areas is exquisitely painful.     Mood: the patient reports good control of his mood.     Problem list and histories reviewed & adjusted, as indicated.  Additional history: as documented    Patient Active Problem List   Diagnosis     CARDIOVASCULAR SCREENING; LDL GOAL LESS THAN 160     Chemical dependency (H)     DJD (degenerative joint disease) of knee     Abnormal gait     Advanced directives, counseling/discussion     Diverticulosis     Hypothyroidism     Major depressive disorder     Osteoarthritis of left hand     Past Surgical History:   Procedure Laterality Date     ARTHROPLASTY KNEE UNICOMPARTMENT  11/8/2011    Procedure:ARTHROPLASTY KNEE UNICOMPARTMENT;  LEFT KNEE UNICOMPARTMENT ARTHROPLASTY (FREEMAN)^; Surgeon:HSABBIR RADER; Location:[facility] OR     ARTHROSCOPY KNEE RT/LT  1/2006    left     COLONOSCOPY  3/27/08    due in 2013       Social History   Substance Use Topics     Smoking status: Never Smoker     Smokeless tobacco: Never Used     Alcohol use No     Family History   Problem Relation Age of Onset     Family History Negative Father      CANCER Mother      Eye Disorder Paternal Grandmother      Asthma No family hx of      C.A.D. No family hx of      DIABETES No family hx of      Hypertension No family hx of      CEREBROVASCULAR DISEASE No family hx of      Breast Cancer No family hx of      Cancer - colorectal No family hx of      Prostate Cancer No family hx of          Current Outpatient Prescriptions   Medication Sig Dispense Refill     levothyroxine (SYNTHROID/LEVOTHROID) 100 MCG tablet TAKE ONE TABLET BY MOUTH ONCE DAILY 90 tablet 0     buprenorphine-naloxone (SUBOXONE) 8-2 MG SUBL sublingual tablet        citalopram (CELEXA) 40 MG tablet Take 1 tablet (40 mg) by mouth daily 90 tablet 3     cyclobenzaprine (FLEXERIL) 10 MG tablet Take 0.5-1 tablets (5-10 mg) by mouth 3 times daily as needed for muscle spasms (Patient not taking: Reported on 6/29/2017) 30 tablet 0     omeprazole 20 MG tablet Take 1 tablet (20 mg) by mouth daily Take 30-60 minutes before a meal. (Patient not taking: Reported on 12/19/2017) 90 tablet 1     ASTAXANTHIN PO        chlorhexidine (PERIDEX) 0.12 % solution   2     Multiple Vitamins-Minerals (MULTIVITAL PO)        buprenorphine-naloxone (SUBOXONE) 8-2 MG SUBL Place 1 tablet under the tongue 2 times daily. 60 each 0     No Known Allergies    ROS:  Constitutional, neuro, ENT, endocrine, pulmonary, cardiac, gastrointestinal, genitourinary, musculoskeletal, integument and psychiatric systems are negative, except as otherwise noted.    This document serves as a record of the services and decisions personally performed and made by Nancy  ANDERSON Sen. It was created on her behalf by Bacilio Goddard, a trained medical scribe. The creation of this document is based on the provider's statements to the medical scribe.  Bacilio Goddard 4:39 PM December 19, 2017    OBJECTIVE:                                                    /82 (BP Location: Left arm, Patient Position: Chair, Cuff Size: Adult Regular)  Pulse 80  Temp 98.6  F (37  C) (Oral)  Resp 16  Wt 84.7 kg (186 lb 12.8 oz)  BMI 27.35 kg/m2  Body mass index is 27.35 kg/(m^2).     GENERAL APPEARANCE: healthy, alert and no distress, overweight  MS: See ortho exams below  NEURO: Normal strength and tone, mentation intact and speech normal  PSYCH: mentation appears normal and affect normal/bright    RIGHT HAND: On the right middle finger PIP joint there is an approximately 4 mm firm cyst  LEFT KNEE: Warm to the touch, normal ROM, partial posterior knee effusion noted     Diagnostic test results:  No results found for this or any previous visit (from the past 24 hour(s)).       ASSESSMENT/PLAN:                                                      ICD-10-CM    1. Acute pain of left knee M25.562 ORTHO  REFERRAL   2. Need for prophylactic vaccination and inoculation against influenza Z23 FLU VAC, SPLIT VIRUS IM > 3 YO (QUADRIVALENT) [54991]     Vaccine Administration, Initial [90359]   3. Cyst of joint of right hand M25.841    4. Hypothyroidism, unspecified type E03.9 TSH with free T4 reflex     Left knee: advised seeing an orthopedic specialist for a cortisone injection and consideration of further imaging. Orthopedics referral given.     Right middle finger PIP joint ganglion cyst: the patient is encouraged to follow up with orthopedics in regard to this issue as well.     Labs: Order placed for labs (TSH) that the patient will complete today. Adjusting thyroid dosing. Needs labs in 3 months again.     Vaccination(s) administered: Influenza    Follow up with Provider - per orthopedics and  thyroid results    The information in this document, created by the medical scribe for me, accurately reflects the services I personally performed and the decisions made by me. I have reviewed and approved this document for accuracy prior to leaving the patient care area.  December 19, 2017 4:49 PM     B[facility]

## 2017-12-19 ENCOUNTER — OFFICE VISIT (OUTPATIENT)
Dept: FAMILY MEDICINE | Facility: CLINIC | Age: 62
End: 2017-12-19
Payer: COMMERCIAL

## 2017-12-19 VITALS
WEIGHT: 186.8 LBS | RESPIRATION RATE: 16 BRPM | TEMPERATURE: 98.6 F | DIASTOLIC BLOOD PRESSURE: 82 MMHG | BODY MASS INDEX: 27.35 KG/M2 | HEART RATE: 80 BPM | SYSTOLIC BLOOD PRESSURE: 136 MMHG

## 2017-12-19 DIAGNOSIS — M25.562 ACUTE PAIN OF LEFT KNEE: Primary | ICD-10-CM

## 2017-12-19 DIAGNOSIS — Z23 NEED FOR PROPHYLACTIC VACCINATION AND INOCULATION AGAINST INFLUENZA: ICD-10-CM

## 2017-12-19 DIAGNOSIS — E03.9 HYPOTHYROIDISM, UNSPECIFIED TYPE: ICD-10-CM

## 2017-12-19 DIAGNOSIS — M25.841 CYST OF JOINT OF RIGHT HAND: ICD-10-CM

## 2017-12-19 PROCEDURE — 90471 IMMUNIZATION ADMIN: CPT | Performed by: NURSE PRACTITIONER

## 2017-12-19 PROCEDURE — 99214 OFFICE O/P EST MOD 30 MIN: CPT | Mod: 25 | Performed by: NURSE PRACTITIONER

## 2017-12-19 PROCEDURE — 84439 ASSAY OF FREE THYROXINE: CPT | Performed by: NURSE PRACTITIONER

## 2017-12-19 PROCEDURE — 36415 COLL VENOUS BLD VENIPUNCTURE: CPT | Performed by: NURSE PRACTITIONER

## 2017-12-19 PROCEDURE — 90686 IIV4 VACC NO PRSV 0.5 ML IM: CPT | Performed by: NURSE PRACTITIONER

## 2017-12-19 PROCEDURE — 84443 ASSAY THYROID STIM HORMONE: CPT | Performed by: NURSE PRACTITIONER

## 2017-12-19 ASSESSMENT — PATIENT HEALTH QUESTIONNAIRE - PHQ9: SUM OF ALL RESPONSES TO PHQ QUESTIONS 1-9: 5

## 2017-12-19 NOTE — MR AVS SNAPSHOT
After Visit Summary   12/19/2017    Martin Armstrong    MRN: [id number]           Patient Information     Date Of Birth          1955        Visit Information        Provider Department      12/19/2017 4:20 PM Nancy Sen APRN CNP [facility]        Today's Diagnoses     Acute pain of left knee    -  1    Need for prophylactic vaccination and inoculation against influenza        Cyst of joint of right hand        Hypothyroidism, unspecified type           Follow-ups after your visit        Additional Services     ORTHO  REFERRAL       [facility] is referring you to the Orthopedic  Services at [facility].       The  Representative will assist you in the coordination of your Orthopedic and Musculoskeletal Care as prescribed by your physician.    The  Representative will call you within 1 business day to help schedule your appointment, or you may contact the  Representative at:    All areas ~ [phone]     Type of Referral : Dr. Jiménez       Timeframe requested: appt. Made      Coverage of these services is subject to the terms and limitations of your health insurance plan.  Please call member services at your health plan with any benefit or coverage questions.      If X-rays, CT or MRI's have been performed, please contact the facility where they were done to arrange for , prior to your scheduled appointment.  Please bring this referral request to your appointment and present it to your specialist.                  Follow-up notes from your care team     Return if symptoms worsen or fail to improve.      Your next 10 appointments already scheduled     Dec 21, 2017 10:15 AM CST   New Visit with Abdulkadir Jiménez MD   [facility] [place] ([facility])    [address]  [address]  [place] [address] [phone]              Future tests that were ordered for  you today     Open Future Orders        Priority Expected Expires Ordered    TSH with free T4 reflex Routine  3/6/2018 12/20/2017            Who to contact     If you have questions or need follow up information about today's clinic visit or your schedule please contact [facility] directly at [phone].  Normal or non-critical lab and imaging results will be communicated to you by MyChart, letter or phone within 4 business days after the clinic has received the results. If you do not hear from us within 7 days, please contact the clinic through [employer] Corporation[employer] or phone. If you have a critical or abnormal lab result, we will notify you by phone as soon as possible.  Submit refill requests through [employer] or call your pharmacy and they will forward the refill request to us. Please allow 3 business days for your refill to be completed.          Additional Information About Your Visit        MyChart Information     [employer] gives you secure access to your electronic health record. If you see a primary care provider, you can also send messages to your care team and make appointments. If you have questions, please call your primary care clinic.  If you do not have a primary care provider, please call [phone] and they will assist you.        Care EveryWhere ID     This is your Care EveryWhere ID. This could be used by other organizations to access your [facility] medical records  [id number]        Your Vitals Were     Pulse Temperature Respirations BMI (Body Mass Index)          80 98.6  F (37  C) (Oral) 16 27.35 kg/m2         Blood Pressure from Last 3 Encounters:   12/19/17 136/82   06/29/17 132/80   01/31/17 136/80    Weight from Last 3 Encounters:   12/19/17 186 lb 12.8 oz (84.7 kg)   06/29/17 188 lb 4.8 oz (85.4 kg)   01/31/17 193 lb 8 oz (87.8 kg)              We Performed the Following     FLU VAC, SPLIT VIRUS IM > 3 YO (QUADRIVALENT) [55868]     ORTHO  REFERRAL     T4 free     TSH with free T4  reflex     Vaccine Administration, Initial [57567]          Today's Medication Changes          These changes are accurate as of: 12/19/17 11:59 PM.  If you have any questions, ask your nurse or doctor.               These medicines have changed or have updated prescriptions.        Dose/Directions    levothyroxine 112 MCG tablet   Commonly known as:  SYNTHROID/LEVOTHROID   This may have changed:  See the new instructions.   Used for:  Hypothyroidism, unspecified type   Changed by:  Nancy Sen APRN CNP        Dose:  112 mcg   Take 1 tablet (112 mcg) by mouth daily   Quantity:  90 tablet   Refills:  0            Where to get your medicines      These medications were sent to [facility] [address] [place] [address] [facility]  [address] [facility] [address]     Phone:  [phone]     levothyroxine 112 MCG tablet                Primary Care Provider Office Phone # Fax #    BETH Treadwell [phone] [address] [facility] [address]        Equal Access to Services     [facility]: Hadii aad ku hadasho Soomaali, waaxda luqadaha, qaybta kaalmada adeegyada, waxay idiin hayaan lizbeth kharairaida nina . So [facility] [phone].    ATENCIÓN: Si habla español, tiene a ba disposición servicios gratuitos de asistencia lingüística. [facility] [phone].    We comply with applicable federal civil rights laws and [place] laws. We do not discriminate on the basis of race, color, national origin, age, disability, sex, sexual orientation, or gender identity.            Thank you!     Thank you for choosing [facility]  for your care. Our goal is always to provide you with excellent care. Hearing back from our patients is one way we can continue to improve our services. Please take a few minutes to complete the written survey that you may receive in the mail after your visit with us. Thank you!             Your Updated Medication List - Protect others around you: Learn  how to safely use, store and throw away your medicines at www.disposemymeds.org.          This list is accurate as of: 12/19/17 11:59 PM.  Always use your most recent med list.                   Brand Name Dispense Instructions for use Diagnosis    ASTAXANTHIN PO           chlorhexidine 0.12 % solution    PERIDEX          citalopram 40 MG tablet    celeXA    90 tablet    Take 1 tablet (40 mg) by mouth daily    Major depressive disorder, recurrent episode, moderate (H)       cyclobenzaprine 10 MG tablet    FLEXERIL    30 tablet    Take 0.5-1 tablets (5-10 mg) by mouth 3 times daily as needed for muscle spasms    Sacroiliac joint dysfunction, Left-sided low back pain without sciatica       levothyroxine 112 MCG tablet    SYNTHROID/LEVOTHROID    90 tablet    Take 1 tablet (112 mcg) by mouth daily    Hypothyroidism, unspecified type       MULTIVITAL PO           omeprazole 20 MG tablet     90 tablet    Take 1 tablet (20 mg) by mouth daily Take 30-60 minutes before a meal.    Epigastric pain       * SUBOXONE 8-2 MG Subl sublingual tablet   Generic drug:  buprenorphine-naloxone     60 each    Place 1 tablet under the tongue 2 times daily.    Chemical dependency (H)       * buprenorphine-naloxone 8-2 MG Subl sublingual tablet    SUBOXONE          * Notice:  This list has 2 medication(s) that are the same as other medications prescribed for you. Read the directions carefully, and ask your doctor or other care provider to review them with you.

## 2017-12-19 NOTE — PROGRESS NOTES
Injectable Influenza Immunization Documentation    1.  Is the person to be vaccinated sick today?   No    2. Does the person to be vaccinated have an allergy to a component   of the vaccine?   No  Egg Allergy Algorithm Link    3. Has the person to be vaccinated ever had a serious reaction   to influenza vaccine in the past?   No    4. Has the person to be vaccinated ever had Guillain-Barré syndrome?   No    Form completed by Maria T Almanzar    Prior to injection verified patient identity using patient's name and date of birth.

## 2017-12-19 NOTE — NURSING NOTE
"Chief Complaint   Patient presents with     Musculoskeletal Problem     Panel Management     Flu, PHQ9       Initial /82 (BP Location: Left arm, Patient Position: Chair, Cuff Size: Adult Regular)  Pulse 80  Temp 98.6  F (37  C) (Oral)  Resp 16  Wt 186 lb 12.8 oz (84.7 kg)  BMI 27.35 kg/m2 Estimated body mass index is 27.35 kg/(m^2) as calculated from the following:    Height as of 6/29/17: 5' 9.29\" (1.76 m).    Weight as of this encounter: 186 lb 12.8 oz (84.7 kg).  Medication Reconciliation: complete  "

## 2017-12-20 LAB
T4 FREE SERPL-MCNC: 0.97 NG/DL (ref 0.76–1.46)
TSH SERPL DL<=0.005 MIU/L-ACNC: 4.88 MU/L (ref 0.4–4)

## 2017-12-20 RX ORDER — LEVOTHYROXINE SODIUM 112 UG/1
112 TABLET ORAL DAILY
Qty: 90 TABLET | Refills: 0 | Status: SHIPPED | OUTPATIENT
Start: 2017-12-20 | End: 2018-03-19 | Stop reason: DRUGHIGH

## 2017-12-21 ENCOUNTER — RADIANT APPOINTMENT (OUTPATIENT)
Dept: GENERAL RADIOLOGY | Facility: CLINIC | Age: 62
End: 2017-12-21
Attending: ORTHOPAEDIC SURGERY
Payer: COMMERCIAL

## 2017-12-21 ENCOUNTER — OFFICE VISIT (OUTPATIENT)
Dept: ORTHOPEDICS | Facility: CLINIC | Age: 62
End: 2017-12-21
Payer: COMMERCIAL

## 2017-12-21 VITALS — TEMPERATURE: 98.7 F | BODY MASS INDEX: 27.11 KG/M2 | HEIGHT: 69 IN | RESPIRATION RATE: 18 BRPM | WEIGHT: 183 LBS

## 2017-12-21 DIAGNOSIS — M79.644 PAIN IN FINGER OF BOTH HANDS: ICD-10-CM

## 2017-12-21 DIAGNOSIS — M19.142 POST-TRAUMATIC OSTEOARTHRITIS OF LEFT HAND: ICD-10-CM

## 2017-12-21 DIAGNOSIS — M25.062 HEMARTHROSIS OF LEFT KNEE: ICD-10-CM

## 2017-12-21 DIAGNOSIS — M79.645 PAIN IN FINGER OF BOTH HANDS: ICD-10-CM

## 2017-12-21 DIAGNOSIS — M67.441 GANGLION, FINGER OF RIGHT HAND: ICD-10-CM

## 2017-12-21 DIAGNOSIS — Z96.652 STATUS POST LEFT PARTIAL KNEE REPLACEMENT: ICD-10-CM

## 2017-12-21 DIAGNOSIS — Z96.652 STATUS POST LEFT PARTIAL KNEE REPLACEMENT: Primary | ICD-10-CM

## 2017-12-21 PROCEDURE — 73130 X-RAY EXAM OF HAND: CPT | Mod: RT

## 2017-12-21 PROCEDURE — 20610 DRAIN/INJ JOINT/BURSA W/O US: CPT | Mod: LT | Performed by: ORTHOPAEDIC SURGERY

## 2017-12-21 PROCEDURE — 99243 OFF/OP CNSLTJ NEW/EST LOW 30: CPT | Mod: 25 | Performed by: ORTHOPAEDIC SURGERY

## 2017-12-21 PROCEDURE — 73562 X-RAY EXAM OF KNEE 3: CPT | Mod: LT

## 2017-12-21 NOTE — PROGRESS NOTES
Martin Armstrong is a 62 year old male who is seen in consultation at the request of Nancy Sen NP  for acute swelling an dorsalis pedis in left knee, more chronic pain in fingers.  He has had left unicondylar knee arthroplasty.    Past Medical History:   Diagnosis Date     Chemical dependency (H)     remission- 2008, suboxone     Diverticulosis      DJD (degenerative joint disease) of knee        Past Surgical History:   Procedure Laterality Date     ARTHROPLASTY KNEE UNICOMPARTMENT  11/8/2011    Procedure:ARTHROPLASTY KNEE UNICOMPARTMENT; LEFT KNEE UNICOMPARTMENT ARTHROPLASTY (FREEMAN)^; Surgeon:SHABBIR RADER; Location:[facility]     ARTHROSCOPY KNEE RT/LT  1/2006    left     COLONOSCOPY  3/27/08    due in 2013       Family History   Problem Relation Age of Onset     Family History Negative Father      CANCER Mother      Eye Disorder Paternal Grandmother      Asthma No family hx of      C.A.D. No family hx of      DIABETES No family hx of      Hypertension No family hx of      CEREBROVASCULAR DISEASE No family hx of      Breast Cancer No family hx of      Cancer - colorectal No family hx of      Prostate Cancer No family hx of        Social History     Social History     Marital status:      Spouse name: N/A     Number of children: 6     Years of education: N/A     Occupational History      [employer]     Social History Main Topics     Smoking status: Never Smoker     Smokeless tobacco: Never Used     Alcohol use No     Drug use: No      Comment: h/o chem dep due to post -op analgesics 1-06     Sexual activity: Yes     Partners: Female      Comment: no protection     Other Topics Concern     Parent/Sibling W/ Cabg, Mi Or Angioplasty Before 65f 55m? No     Social History Narrative       Current Outpatient Prescriptions   Medication Sig Dispense Refill     levothyroxine (SYNTHROID/LEVOTHROID) 112 MCG tablet Take 1 tablet (112 mcg) by mouth daily 90 tablet 0     buprenorphine-naloxone (SUBOXONE) 8-2 MG  "SUBL sublingual tablet        citalopram (CELEXA) 40 MG tablet Take 1 tablet (40 mg) by mouth daily 90 tablet 3     cyclobenzaprine (FLEXERIL) 10 MG tablet Take 0.5-1 tablets (5-10 mg) by mouth 3 times daily as needed for muscle spasms 30 tablet 0     omeprazole 20 MG tablet Take 1 tablet (20 mg) by mouth daily Take 30-60 minutes before a meal. 90 tablet 1     ASTAXANTHIN PO        chlorhexidine (PERIDEX) 0.12 % solution   2     Multiple Vitamins-Minerals (MULTIVITAL PO)        buprenorphine-naloxone (SUBOXONE) 8-2 MG SUBL Place 1 tablet under the tongue 2 times daily. 60 each 0       No Known Allergies    REVIEW OF SYSTEMS:  CONSTITUTIONAL:  NEGATIVE for fever, chills, change in weight, not feeling tired  SKIN:  NEGATIVE for worrisome rashes, no skin lumps, no skin ulcers and no non-healing wounds  EYES:  NEGATIVE for vision changes or irritation.  ENT/MOUTH:  NEGATIVE.  No hearing loss, no hoarseness, no difficulty swallowing.  RESP:  NEGATIVE. No cough or shortness of breath.  CV:  NEGATIVE for chest pain, palpitations or peripheral edema  GI:  NEGATIVE for nausea, abdominal pain, heartburn, or change in bowel habits  :  Negative. No dysuria, no hematuria  MUSCULOSKELETAL:  See HPI above  NEURO:  NEGATIVE . No headaches, no dizziness,  no numbness  ENDOCRINE:  NEGATIVE for temperature intolerance, skin/hair changes  HEME/ALLERGY/IMMUNE:  NEGATIVE for bleeding problems  PSYCHIATRIC:  NEGATIVE. no anxiety, no depression.     Exam:  Vitals: Temp 98.7  F (37.1  C)  Resp 18  Ht 1.759 m (5' 9.25\")  Wt 83 kg (183 lb)  BMI 26.83 kg/m2  BMI= Body mass index is 26.83 kg/(m^2).  Constitutional:  healthy, alert and no distress  Neuro: Alert and Oriented x 3, Gait normal. Sensation grossly WNL.  Psych: Affect normal   Respiratory: Breathing not labored.  Cardiovascular: normal peripheral pulses  Lymph: no adenopathy  Skin: No rashes,worrisome lesions or skin problems    "

## 2017-12-21 NOTE — LETTER
WORKABILITY    [facility], Dr. Abdulkadir Jiménez M.D.  [facility]        12/21/2017      RE: Martin Armstrong    [address] [place] [address]        To whom it may concern:     Martin Armstrong is under my care for   1. Status post left partial knee replacement    2. Pain in finger of both hands    3. Hemarthrosis of left knee      Work related injury: No   Stay off the knee for the next 2 days.    [facility] Improvement (Date): unknown    Next appointment: 2 weeks          Abdulkadir Jiménez M.D.            F

## 2017-12-21 NOTE — NURSING NOTE
"Chief Complaint   Patient presents with     Consult     Left knee pain since 12/15/17 patient states he does lots of physical work. Pain level 6/10 dull, achy and constant. Elevating the leg and laying flat makes the pain better and pressure and pivoting makes the pain worse. Patient has a history of a partail left knee replacement.      Consult     Right middle finger cyst for the last year.       Initial Temp 98.7  F (37.1  C)  Resp 18  Ht 1.759 m (5' 9.25\")  Wt 83 kg (183 lb)  BMI 26.83 kg/m2 Estimated body mass index is 26.83 kg/(m^2) as calculated from the following:    Height as of this encounter: 1.759 m (5' 9.25\").    Weight as of this encounter: 83 kg (183 lb).  Medication Reconciliation: complete   Kourtney Wild MA      "

## 2017-12-21 NOTE — PROGRESS NOTES
The patient's left knee was prepped with betadine solution after verification of allergies. Area approximately 10 cm x 10 cm prepped in a sterile fashion. The skin was infiltrated with 1% lidocaine by Dr. Abdulkadir Jiménez. Approximately 140-150 cc's of bloody fluid were removed. The betadine was then removed with soap and water and band-aids applied.     Ruslan Colby PA-C  Supervising physician: Abdulkadir Jiménez MD  Dept. of Orthopedics  [facility]

## 2017-12-21 NOTE — LETTER
12/21/2017         RE: Martin Armstrong  [id number] [address]        Dear Colleague,    Thank you for referring your patient, Martin Armstrong, to the [facility]. Please see a copy of my visit note below.    Martin Armstrong is a 62 year old male who is seen in consultation at the request of Nancy Sen NP  for acute swelling an dorsalis pedis in left knee, more chronic pain in fingers.  He has had left unicondylar knee arthroplasty.    Past Medical History:   Diagnosis Date     Chemical dependency (H)     remission- 2008, suboxone     Diverticulosis      DJD (degenerative joint disease) of knee        Past Surgical History:   Procedure Laterality Date     ARTHROPLASTY KNEE UNICOMPARTMENT  11/8/2011    Procedure:ARTHROPLASTY KNEE UNICOMPARTMENT; LEFT KNEE UNICOMPARTMENT ARTHROPLASTY (FREEMAN)^; Surgeon:SHABBIR RADER; Location:[facility]     ARTHROSCOPY KNEE RT/LT  1/2006    left     COLONOSCOPY  3/27/08    due in 2013       Family History   Problem Relation Age of Onset     Family History Negative Father      CANCER Mother      Eye Disorder Paternal Grandmother      Asthma No family hx of      C.A.D. No family hx of      DIABETES No family hx of      Hypertension No family hx of      CEREBROVASCULAR DISEASE No family hx of      Breast Cancer No family hx of      Cancer - colorectal No family hx of      Prostate Cancer No family hx of        Social History     Social History     Marital status:      Spouse name: N/A     Number of children: 6     Years of education: N/A     Occupational History      [employer]          Social History Main Topics     Smoking status: Never Smoker     Smokeless tobacco: Never Used     Alcohol use No     Drug use: No      Comment: h/o chem dep due to post -op analgesics 1-06     Sexual activity: Yes     Partners: Female      Comment: no protection     Other Topics Concern     Parent/Sibling W/ Cabg, Mi Or Angioplasty Before 65f 55m? No     Social  "History Narrative       Current Outpatient Prescriptions   Medication Sig Dispense Refill     levothyroxine (SYNTHROID/LEVOTHROID) 112 MCG tablet Take 1 tablet (112 mcg) by mouth daily 90 tablet 0     buprenorphine-naloxone (SUBOXONE) 8-2 MG SUBL sublingual tablet        citalopram (CELEXA) 40 MG tablet Take 1 tablet (40 mg) by mouth daily 90 tablet 3     cyclobenzaprine (FLEXERIL) 10 MG tablet Take 0.5-1 tablets (5-10 mg) by mouth 3 times daily as needed for muscle spasms 30 tablet 0     omeprazole 20 MG tablet Take 1 tablet (20 mg) by mouth daily Take 30-60 minutes before a meal. 90 tablet 1     ASTAXANTHIN PO        chlorhexidine (PERIDEX) 0.12 % solution   2     Multiple Vitamins-Minerals (MULTIVITAL PO)        buprenorphine-naloxone (SUBOXONE) 8-2 MG SUBL Place 1 tablet under the tongue 2 times daily. 60 each 0       No Known Allergies    REVIEW OF SYSTEMS:  CONSTITUTIONAL:  NEGATIVE for fever, chills, change in weight, not feeling tired  SKIN:  NEGATIVE for worrisome rashes, no skin lumps, no skin ulcers and no non-healing wounds  EYES:  NEGATIVE for vision changes or irritation.  ENT/MOUTH:  NEGATIVE.  No hearing loss, no hoarseness, no difficulty swallowing.  RESP:  NEGATIVE. No cough or shortness of breath.  CV:  NEGATIVE for chest pain, palpitations or peripheral edema  GI:  NEGATIVE for nausea, abdominal pain, heartburn, or change in bowel habits  :  Negative. No dysuria, no hematuria  MUSCULOSKELETAL:  See HPI above  NEURO:  NEGATIVE . No headaches, no dizziness,  no numbness  ENDOCRINE:  NEGATIVE for temperature intolerance, skin/hair changes  HEME/ALLERGY/IMMUNE:  NEGATIVE for bleeding problems  PSYCHIATRIC:  NEGATIVE. no anxiety, no depression.     Exam:  Vitals: Temp 98.7  F (37.1  C)  Resp 18  Ht 1.759 m (5' 9.25\")  Wt 83 kg (183 lb)  BMI 26.83 kg/m2  BMI= Body mass index is 26.83 kg/(m^2).  Constitutional:  healthy, alert and no distress  Neuro: Alert and Oriented x 3, Gait normal. Sensation " grossly WNL.  Psych: Affect normal   Respiratory: Breathing not labored.  Cardiovascular: normal peripheral pulses  Lymph: no adenopathy  Skin: No rashes,worrisome lesions or skin problems      The patient's left knee was prepped with betadine solution after verification of allergies. Area approximately 10 cm x 10 cm prepped in a sterile fashion. The skin was infiltrated with 1% lidocaine by Dr. Abdulkadir Jiménez. Approximately 140-150 cc's of bloody fluid were removed. The betadine was then removed with soap and water and band-aids applied.     ADY HayesC  Supervising physician: Abdulkadir Jiménez MD  Dept. of Orthopedics  [facility]              Again, thank you for allowing me to participate in the care of your patient.        Sincerely,        Abdulkadir Jiménez MD

## 2017-12-21 NOTE — MR AVS SNAPSHOT
After Visit Summary   12/21/2017    Martin Armstrong    MRN: [id number]           Patient Information     Date Of Birth          1955        Visit Information        Provider Department      12/21/2017 10:15 AM Abdulkadir Jiménez MD [facility]        Today's Diagnoses     Status post left partial knee replacement    -  1    Pain in finger of both hands        Hemarthrosis of left knee        Post-traumatic osteoarthritis of left hand        Ganglion, finger of right hand           Follow-ups after your visit        Who to contact     If you have questions or need follow up information about today's clinic visit or your schedule please contact [facility] directly at [phone].  Normal or non-critical lab and imaging results will be communicated to you by MyChart, letter or phone within 4 business days after the clinic has received the results. If you do not hear from us within 7 days, please contact the clinic through [facility] or phone. If you have a critical or abnormal lab result, we will notify you by phone as soon as possible.  Submit refill requests through [employer] or call your pharmacy and they will forward the refill request to us. Please allow 3 business days for your refill to be completed.          Additional Information About Your Visit        MyChart Information     [employer] gives you secure access to your electronic health record. If you see a primary care provider, you can also send messages to your care team and make appointments. If you have questions, please call your primary care clinic.  If you do not have a primary care provider, please call [phone] and they will assist you.        Care EveryWhere ID     This is your Care EveryWhere ID. This could be used by other organizations to access your [place] medical records  [id number]        Your Vitals Were     Temperature Respirations Height BMI (Body Mass Index)          98.7  F (37.1  C) 18  "1.759 m (5' 9.25\") 26.83 kg/m2         Blood Pressure from Last 3 Encounters:   12/19/17 136/82   06/29/17 132/80   01/31/17 136/80    Weight from Last 3 Encounters:   12/21/17 83 kg (183 lb)   12/19/17 84.7 kg (186 lb 12.8 oz)   06/29/17 85.4 kg (188 lb 4.8 oz)              We Performed the Following     DRAIN/INJECT LARGE JOINT/BURSA        Primary Care Provider Office Phone # Fax #    Nancy BETH Mackenzie [facility] [phone] [address] [facility] [address]        Equal Access to Services     LUIZ HARO : Hadii milka almarazo Soselena, waaxda luqadaha, qaybta kaalmada adeegyada, sabrina nina . So [facility] [phone].    ATENCIÓN: Si habla español, tiene a ba disposición servicios gratuitos de asistencia lingüística. Llame al [phone].    We comply with applicable federal civil rights laws and [place] laws. We do not discriminate on the basis of race, color, national origin, age, disability, sex, sexual orientation, or gender identity.            Thank you!     Thank you for choosing [facility]  for your care. Our goal is always to provide you with excellent care. Hearing back from our patients is one way we can continue to improve our services. Please take a few minutes to complete the written survey that you may receive in the mail after your visit with us. Thank you!             Your Updated Medication List - Protect others around you: Learn how to safely use, store and throw away your medicines at www.disposemymeds.org.          This list is accurate as of: 12/21/17 11:57 AM.  Always use your most recent med list.                   Brand Name Dispense Instructions for use Diagnosis    ASTAXANTHIN PO           chlorhexidine 0.12 % solution    PERIDEX          citalopram 40 MG tablet    celeXA    90 tablet    Take 1 tablet (40 mg) by mouth daily    Major depressive disorder, recurrent episode, moderate (H)       cyclobenzaprine 10 MG tablet    " FLEXERIL    30 tablet    Take 0.5-1 tablets (5-10 mg) by mouth 3 times daily as needed for muscle spasms    Sacroiliac joint dysfunction, Left-sided low back pain without sciatica       levothyroxine 112 MCG tablet    SYNTHROID/LEVOTHROID    90 tablet    Take 1 tablet (112 mcg) by mouth daily    Hypothyroidism, unspecified type       MULTIVITAL PO           omeprazole 20 MG tablet     90 tablet    Take 1 tablet (20 mg) by mouth daily Take 30-60 minutes before a meal.    Epigastric pain       * SUBOXONE 8-2 MG Subl sublingual tablet   Generic drug:  buprenorphine-naloxone     60 each    Place 1 tablet under the tongue 2 times daily.    Chemical dependency (H)       * buprenorphine-naloxone 8-2 MG Subl sublingual tablet    SUBOXONE          * Notice:  This list has 2 medication(s) that are the same as other medications prescribed for you. Read the directions carefully, and ask your doctor or other care provider to review them with you.

## 2017-12-22 NOTE — PROGRESS NOTES
"ORTHOPEDIC SPECIALTY CLINIC CONSULTATION         REQUESTING CLINICIAN:  BETH Cook, CNP      REASON FOR CONSULTATION:  Acute pain and swelling of left knee.      HISTORY OF PRESENT ILLNESS:  The patient Martin Armstrong is a 62-year-old man referred to us for evaluation of the above.   He also has chronic pain in his fingers.  He has a lump on the right long finger proximal interphalangeal joint that is concerning to him.  He had a left unicompartmental knee replacement years ago and had done fairly well with that.  However, approximately 10 days ago he developed pain in the left knee, and yesterday the knee suddenly swelled up.  He describes it as \"filling with warm fluid.\"  He is now unable to bend the left knee and has difficulty standing on the left leg.  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  He has dull aching pain in the knee rated at   a 6/10.        The patient has had old injuries to his hands.  He had a right fifth metacarpal fracture previously that has healed well.  He had a  injury to his left index metacarpophalangeal joint which became infected.  X-ray of the hands today show osteoarthritis of the metacarpophalangeal joints of both index and long fingers of both hands.  The left hand index metacarpophalangeal joint is abnormal with erosion of part of the head.  He has significant arthritis of the base of the right thumb carpometacarpal joint.  He has scaphotrapezial arthritis of both hands.  The left knee x-ray shows good position of the unicompartmental knee replacement.  There is a stress reaction line at the anterior portion of the femur and under a small portion of the tibia, but no definite sign of loosening.  We do not have an old x-ray for comparison.      SOCIAL HISTORY:  The patient works as a -fabricator.      PHYSICAL EXAMINATION:     EXTREMITIES:  There is good range of motion of the fingers except the metacarpophalangeal joints which are a bit " tight.  There is a small ganglion cyst over the dorsum of the right long finger proximal interphalangeal joint which is not tender.  He has fairly good  strength, thumb apposition strength and key pinch of both hands.  Sensation and circulation of the hands are intact.  There is a tense effusion of the left knee with no erythema and no increased warmth.  He is only able to bend the knee from approximately 20 degrees to approximately 50 degrees.  He has no ligamentous laxity of medial collateral ligament or lateral collateral ligament.      IMPRESSION:     1.  Left knee hemarthrosis following unicompartmental knee replacement years ago with likely synovial entrapment and bleed.  We discussed treatment options for his knee pain, and for comfort we will proceed with aspiration of the knee and Tubigrip application.     2.  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  We discussed treatment options, and the patient states he will just put up with the pain in his hands.  He understands this is osteoarthritis and that he will have continued problems in this regard.  We could consider excision of the ganglion cyst in the future.        PROCEDURE:  After sterile prep of the left knee and injection of lidocaine I aspirated 140-150 cc of blood from the left knee.  We applied Tubigrip.  The patient is now much more comfortable and walking comfortably as he leaves the office.      PLAN:  We will see the patient back in our office on a p.r.n. basis.         KATLYN PIÑA MD             D: 2017 11:54   T: 2017 11:01   MT: [id number]      Name:     TALI GALVEZ   MRN:      [id number]        Account:      [id number]   :      1955           Visit Date:   2017      Document: [id number]       cc: Nancy Sen  APRN, CNP

## 2017-12-26 ENCOUNTER — TELEPHONE (OUTPATIENT)
Dept: FAMILY MEDICINE | Facility: CLINIC | Age: 62
End: 2017-12-26

## 2017-12-26 NOTE — TELEPHONE ENCOUNTER
Reason for call:  Patient reporting a symptom    Symptom or request:  Fluid, pain and swelling in knee.  Patient had fluid drained last week and its worse and he can hardly walk per spouse    Duration (how long have symptoms been present): ongoing    Have you been treated for this before? Yes, was seen by Dr Jiménez on 12-21-17    Additional comments: spouse calling to report symptomes    Phone Number patient can be reached at:  [phone]     Can we leave a detailed message on this number:  YES    Call taken on 12/26/2017 at 8:54 AM by Ladonna Hammond

## 2017-12-26 NOTE — TELEPHONE ENCOUNTER
Discussed patients concern for recurrent left knee hemarthrosis. Dr Jiménez aspirated 150+cc of bloody fluid on 12/21/17. He was fine post procedure but noted swelling X 24 hr. Describes no fever, malaise. Knee ROM limited by swelling. P: He has pending appt below @ [facility] in am. He will elevate , continue with wrap until visit.  TALI GALVEZ MRN: [id number]    Date: 12/27/2017 Status: Scheduled   Time: 10:10 AM   Length: 20     Visit Type: NEW [656]       Advised to go to ED/ UC prior if worsening sx. He agrees with plan. Other concerns: He requests work restrictions at pending visit.   Alton JARRELL

## 2017-12-26 NOTE — TELEPHONE ENCOUNTER
Called and left message for patient. No answer at this time. Routing to ortho pool.    CHRISTOPHER Lopez

## 2017-12-27 ENCOUNTER — OFFICE VISIT (OUTPATIENT)
Dept: ORTHOPEDICS | Facility: OTHER | Age: 62
End: 2017-12-27
Payer: COMMERCIAL

## 2017-12-27 VITALS — WEIGHT: 183 LBS | BODY MASS INDEX: 27.11 KG/M2 | TEMPERATURE: 97.7 F | HEIGHT: 69 IN

## 2017-12-27 DIAGNOSIS — M25.062 HEMARTHROSIS, LEFT KNEE: ICD-10-CM

## 2017-12-27 DIAGNOSIS — Z96.652 S/P LEFT UNICOMPARTMENTAL KNEE REPLACEMENT: Primary | ICD-10-CM

## 2017-12-27 DIAGNOSIS — M25.462 EFFUSION OF LEFT KNEE: ICD-10-CM

## 2017-12-27 PROCEDURE — 10160 PNXR ASPIR ABSC HMTMA BULLA: CPT | Performed by: ORTHOPAEDIC SURGERY

## 2017-12-27 PROCEDURE — 99203 OFFICE O/P NEW LOW 30 MIN: CPT | Mod: 25 | Performed by: ORTHOPAEDIC SURGERY

## 2017-12-27 RX ORDER — MELOXICAM 15 MG/1
15 TABLET ORAL DAILY
Qty: 30 TABLET | Refills: 1 | Status: SHIPPED | OUTPATIENT
Start: 2017-12-27 | End: 2018-03-16

## 2017-12-27 RX ORDER — TRAMADOL HYDROCHLORIDE 50 MG/1
50-100 TABLET ORAL EVERY 6 HOURS PRN
Qty: 30 TABLET | Refills: 0 | Status: SHIPPED | OUTPATIENT
Start: 2017-12-27 | End: 2018-03-16

## 2017-12-27 ASSESSMENT — PAIN SCALES - GENERAL: PAINLEVEL: NO PAIN (0)

## 2017-12-27 NOTE — NURSING NOTE
"Chief Complaint   Patient presents with     Consult     second opinion per  Nancy Sen, BETH CNP        Initial Temp 97.7  F (36.5  C)  Ht 1.759 m (5' 9.25\")  Wt 83 kg (183 lb)  BMI 26.83 kg/m2 Estimated body mass index is 26.83 kg/(m^2) as calculated from the following:    Height as of this encounter: 1.759 m (5' 9.25\").    Weight as of this encounter: 83 kg (183 lb).  Medication Reconciliation: complete    BP completed using cuff size: NA (Not Taken)    Shiela Carrasquillo MA      "

## 2017-12-27 NOTE — PROGRESS NOTES
ORTHOPEDIC CONSULT      Chief Complaint: Martin Armstrong is a 62 year old male who works as a  and fabricator, he is on his feet all day.  He enjoys shooting his gun is playing golf doing workouts and playing with his grandkids as well as biking.    He is being seen for   Chief Complaints and History of Present Illnesses   Patient presents with     Consult     second opinion per  Nancy Sen APRN CNP          History of Present Illness:   Mechanism of Injury: No specific injury that he recalls  Location: Left lateral knee  Duration of Pain: For approximately 2 weeks.  Patient states that about 2 weeks ago he was washing his hands at work and all of a sudden he felt his knee started to swell up, he states it felt like there was somebody injecting his knee with fluid.  Patient later had difficulty walking in the next day he had an appointment with Dr. Jiménez who drained 150 cc of bloody fluid out of his knee.  Rating of Pain: Minimal pain  Pain Quality: Achy and stiffness because of swelling  Pain is better with: Aspiration of the knee and rest  Pain is worse with: Activity or deep bending  Treatment so far consists of: Patient had an aspiration of his left knee by Dr. Jiménez on 12/21/2017, 150 cc of bloody fluid was drained at that time.  He had also done icing and naproxen.  No other treatments.  Associated Features: Swelling of the left knee  Prior history of related problems: Patient had a unicompartmental knee replacement approximately 6 years ago.  Patient states it was somewhere between 2005 and 2007 by ABHISHEK Carvalho MD at [facility].  He says that after the replacement he has done very well but did have to have his knee drained once after the replacement and it was duarte joint fluid at that time.  Pain is Limiting: Activity and ambulation and deep bending  Here to: Get a second opinion  The Pain Has: Patient denies any catching or locking or giving out of the knee.  Same.  Additional  History: Patient has some posterior pain secondary to swelling in the back of the knee.  Patient denies any injury fall trauma.  Patient denies any real problem with the replacement since it was done.  Patient states that he has been working 50-60 hours per week and he has been feeling the best he has felt recently.  The swelling is the only problem.    Patient's past medical, surgical, social and family histories reviewed.     Past Medical History:   Diagnosis Date     Chemical dependency (H)     remission- 2008, suboxone     Diverticulosis      DJD (degenerative joint disease) of knee          Past Surgical History:   Procedure Laterality Date     ARTHROPLASTY KNEE UNICOMPARTMENT  11/8/2011    Procedure:ARTHROPLASTY KNEE UNICOMPARTMENT; LEFT KNEE UNICOMPARTMENT ARTHROPLASTY (FREEMAN)^; Surgeon:SHABBIR RADER; Location:[facility] OR     ARTHROSCOPY KNEE RT/LT  1/2006    left     COLONOSCOPY  3/27/08    due in 2013       Medications:    Current Outpatient Prescriptions on File Prior to Visit:  levothyroxine (SYNTHROID/LEVOTHROID) 112 MCG tablet Take 1 tablet (112 mcg) by mouth daily   buprenorphine-naloxone (SUBOXONE) 8-2 MG SUBL sublingual tablet    citalopram (CELEXA) 40 MG tablet Take 1 tablet (40 mg) by mouth daily   cyclobenzaprine (FLEXERIL) 10 MG tablet Take 0.5-1 tablets (5-10 mg) by mouth 3 times daily as needed for muscle spasms   omeprazole 20 MG tablet Take 1 tablet (20 mg) by mouth daily Take 30-60 minutes before a meal.   ASTAXANTHIN PO    chlorhexidine (PERIDEX) 0.12 % solution    Multiple Vitamins-Minerals (MULTIVITAL PO)    buprenorphine-naloxone (SUBOXONE) 8-2 MG SUBL Place 1 tablet under the tongue 2 times daily.     No current facility-administered medications on file prior to visit.     No Known Allergies    Social History     Occupational History      [employer]     [employer]     Social History Main Topics     Smoking status: Never Smoker     Smokeless tobacco: Never Used     Alcohol use No     Drug use:  "No      Comment: h/o chem dep due to post -op analgesics 1-06     Sexual activity: Yes     Partners: Female      Comment: no protection       Family History   Problem Relation Age of Onset     Family History Negative Father      CANCER Mother      Eye Disorder Paternal Grandmother      Asthma No family hx of      C.A.D. No family hx of      DIABETES No family hx of      Hypertension No family hx of      CEREBROVASCULAR DISEASE No family hx of      Breast Cancer No family hx of      Cancer - colorectal No family hx of      Prostate Cancer No family hx of        REVIEW OF SYSTEMS  10 point review systems performed otherwise negative as noted as per history of present illness.    Physical Exam:  Vitals: Temp 97.7  F (36.5  C)  Ht 1.759 m (5' 9.25\")  Wt 83 kg (183 lb)  BMI 26.83 kg/m2  BMI= Body mass index is 26.83 kg/(m^2).    Constitutional: healthy, alert and no acute distress   Psychiatric: mentation appears normal and affect normal/bright  NEURO: no focal deficits, CMS intact left lower extremity  RESP: Normal with easy respirations and no use of accessory muscles to breathe, no audible wheezing or retractions  CV: Calf soft and nontender to palpation, leg warm   SKIN: No erythema, rashes, excoriation, or breakdown. No evidence of infection.   MUSCULOSKELETAL:    INSPECTION of left knee: Slight effusion noted compared to the contralateral side.  No gross deformities, erythema, ecchymosis, atrophy or fasciculations.     PALPATION: No tenderness on palpation of the medial, lateral, anterior and posterior portion of the knee. No specific joint line tenderness. No increased warmth.     ROM: Extension full, flexion to approximately 125 . All range of motion without catching, locking or pain.       STRENGTH: 5 out of 5 quad and hamstring strength.     SPECIAL TEST: Patient has a negative Lachman's negative drawer sign. Patient's knee is stable to varus and valgus stress at 30  of flexion. Patient has a negative " Tierney's.   GAIT: non-antalgic  Lymph: no palpable lymph nodes    Diagnostic Modalities:                        XR Knee Left 3 Views    Narrative    LEFT KNEE THREE VIEWS  12/21/2017 10:43 AM     HISTORY:  Status post left partial knee replacement.    COMPARISON: 10/12/2006      Impression    IMPRESSION: Medial compartment arthroplasty. Mild patellar and lateral  compartment spurring. Prominent joint effusion.    LATESHA NIETO MD     We agree with the above reading  Independent visualization of the images was performed.    Impression: 1.  Approximately 6 years status post left knee unicompartmental knee replacement.  2.  Left knee hemarthrosis    Plan:  All of the above pertinent physical exam and imaging modalities findings was reviewed with Martin.                                          INJECTION PROCEDURE:  The patient was counseled about an aspiration and injection, including discussion of risks (including infection), contents of the procedure, rationale for performing the procedure, and expected benefits of the aspiration and injection. The patient was placed in the supine position. The skin was prepped with alcohol and betadine and then utilizing sterile technique an aspiration and injection of the left knee joint from the superior lateral approach. I used marques chloride spray prior to doing the aspiration. I injected 5 ml of 2% lidocaine then removed the needle.  After good anesthetic was achieved, I then used marques chloride again and put in an 18 gauge needed in the same location.  I moved the needle around to get all the fluid I could.  I aspirated 130 ml of dark red bloody fluid.  The patient tolerated the aspiration and injection well, and there were no complications. The injection site was cleaned and covered with a Band-Aid. The injection was performed by JIMBO Camejo     Patient Instructions:  1. We talked about your knee that has been swelling.  2. Dr. Jiménez did get 150cc of bloody fluid out of  the knee.  3. Your knee looks excellent, the replacement is ok.  There is some arthritis developing on the other side, but overall it looks good.  4.  Without an injury we are not.  5. You have a brace already.  We discussed a brace that may help you; but we decided to hold off on that today.  We did an Ace wrap today after draining it.  6. I ordered Mobic 15mg once a day times 2 weeks, then take as needed.  Stop this medication if you have any abdominal pain with it.  I sent this to your pharmacy.      7. We also talked about some Ultram however you are on Suboxone and we would not want to give you this medication when you are on that.  8. We decided on a aspiration today to help with your swelling.  We were able to get 130 cc of dark bloody fluid off your knee.  9. We discussed a MRI study that may give us more information but we decided to hold off on that today.    10.  We dispensed an Ace wrap today to help keep the swelling down.  Wear this as needed.  11.  Follow up with Dr. Blood as needed.  He does unicompartmental knee replacements.  We do not.  You could also follow up with Dr. Carvalho who did the replacement before.  Re-x-ray on return: No    Scribed by Nomi Houston PA-C on 12/27/2017 at 11:30 AM, based on Dr. Anna Bnenett's statements to me.    This note was dictated with [employer].    DIPAK Garcia MD

## 2017-12-27 NOTE — MR AVS SNAPSHOT
After Visit Summary   12/27/2017    Martin Armstrong    MRN: [id number]           Patient Information     Date Of Birth          1955        Visit Information        Provider Department      12/27/2017 10:10 AM Anna Bennett MD [facility]        Today's Diagnoses     S/P left unicompartmental knee replacement    -  1    Effusion of left knee          Care Instructions    Encounter Diagnoses   Name Primary?     S/P left unicompartmental knee replacement Yes     Effusion of left knee      Rest, ice and elevate above heart level as needed for pain control  1. We talked about your knee that has been swelling.  2. Dr. Jiménez did get 150cc of bloody fluid out of the knee.  3. Your knee looks excellent, the replacement is ok.  There is some arthritis developing on the other side, but overall it looks good.  4.  Without an injury we are not.  5. You have a brace already.  We discussed a brace that may help you; but we decided to hold off on that today.  We did an Ace wrap today after draining it.  6. I ordered Mobic 15mg once a day times 2 weeks, then take as needed.  Stop this medication if you have any abdominal pain with it.  I sent this to your pharmacy.      7. We also did ultram and sent it to your pharmacy.  8. We decided on a aspiration today to help with your swelling.   9. We discussed a MRI study that may give us more information but we decided to hold off on that today.    10. Follow up with Dr. Blood as needed.  He does unicompartmental knee replacements.  We do not.  You could also follow up with Dr. Carvalho who did the replacement before.  [employer] may offer reliable information regarding your diagnosis and treatment plan.    THANK YOU for coming in today. If you receive a survey via [employer] or mail please let us know if there was anything you especially appreciated today or if there is any way we can improve our clinic. We appreciate your  input.    GENERAL INFORMATION:  Our hours are:  Monday :     Clinic 7:30 AM-430 PM ([facility])  Tuesday:      Operating Room All Day ([facility])  Wednesday: Clinic 7:30 AM - 11:15 AM ([facility])             Clinic 1:00 PM - 4:00PM ([facility])  Thursday:     Administrative Day  Friday:          Clinic 7:30 AM - 11:15 AM ([facility])            Clinic 1:00 PM - 4:00 PM ([facility])      Bone and Joint Service Line for any issues or concerns: [phone]      We are not in the office Thursdays. Therefore non- urgent calls and medical messages received on Thursday will be addressed when we are back in the office on Wednesday. Urgent matters will be reviewed and addressed by one of our partners in the office as needed.    If lab work was done today as part of your evaluation you will generally be contacted via [employer], mail, or phone with the results within 1-5 days. If there is an alarming result we will contact you by phone. Lab results come back at varying times, I generally wait until all labs are resulted before making comments on results. Please note labs are automatically released to [employer] (if you have signed up for it) once available-at times you may see these prior to my having a chance to review them as well.    If you need refills please contact your pharmacist. They will send a refill request to me to review. Please allow 3 business days for us to process all refill requests. All narcotic refills should be handled in the clinic at the time of your visit.          Follow-ups after your visit        Who to contact     If you have questions or need follow up information about today's clinic visit or your schedule please contact [facility] directly at [phone].  Normal or non-critical lab and imaging results will be communicated to you by MyChart, letter or  "phone within 4 business days after the clinic has received the results. If you do not hear from us within 7 days, please contact the clinic through [employer] or phone. If you have a critical or abnormal lab result, we will notify you by phone as soon as possible.  Submit refill requests through [employer] or call your pharmacy and they will forward the refill request to us. Please allow 3 business days for your refill to be completed.          Additional Information About Your Visit        hiyalife Information     [employer] gives you secure access to your electronic health record. If you see a primary care provider, you can also send messages to your care team and make appointments. If you have questions, please call your primary care clinic.  If you do not have a primary care provider, please call [phone] and they will assist you.        Care EveryWhere ID     This is your Care EveryWhere ID. This could be used by other organizations to access your [place] medical records  [id number]        Your Vitals Were     Temperature Height BMI (Body Mass Index)             97.7  F (36.5  C) 1.759 m (5' 9.25\") 26.83 kg/m2          Blood Pressure from Last 3 Encounters:   12/19/17 136/82   06/29/17 132/80   01/31/17 136/80    Weight from Last 3 Encounters:   12/27/17 83 kg (183 lb)   12/21/17 83 kg (183 lb)   12/19/17 84.7 kg (186 lb 12.8 oz)              Today, you had the following     No orders found for display         Today's Medication Changes          These changes are accurate as of: 12/27/17 11:15 AM.  If you have any questions, ask your nurse or doctor.               Start taking these medicines.        Dose/Directions    meloxicam 15 MG tablet   Commonly known as:  MOBIC   Used for:  S/P left unicompartmental knee replacement, Effusion of left knee        Dose:  15 mg   Take 1 tablet (15 mg) by mouth daily   Quantity:  30 tablet   Refills:  1       traMADol 50 MG tablet   Commonly known as:  ULTRAM   Used for:  S/P " left unicompartmental knee replacement, Effusion of left knee        Dose:   mg   Take 1-2 tablets ( mg) by mouth every 6 hours as needed for pain Give 1 tab for pain 1-5, give 2 tabs for pain 5-10. Indication: Pain   Quantity:  30 tablet   Refills:  0            Where to get your medicines      These medications [place] [facility] [place] [address]  [address], [facility] [address]     Phone:  [phone]     meloxicam 15 MG tablet         Some of these will need a paper prescription and others can be bought over the counter.  Ask your nurse if you have questions.     Bring a paper prescription for each of these medications     traMADol 50 MG tablet                Primary Care Provider Office Phone # Fax #    BETH Treadwell [phone] [address] [facility] [facility] [address]        Equal Access to Services     [facility] AH: Hadii milka carreon hadasho Soomaali, waaxda luqadaha, qaybta kaalmada adeegyada, waxay luciain haywilder nina . So [facility] [phone].    ATENCIÓN: Si habla español, tiene a ba disposición servicios gratuitos de asistencia lingüística. Llame al [phone].    We comply with applicable federal civil rights laws and [place] laws. We do not discriminate on the basis of race, color, national origin, age, disability, sex, sexual orientation, or gender identity.            Thank you!     Thank you for choosing [facility]  for your care. Our goal is always to provide you with excellent care. Hearing back from our patients is one way we can continue to improve our services. Please take a few minutes to complete the written survey that you may receive in the mail after your visit with us. Thank you!             Your Updated Medication List - Protect others around you: Learn how to safely use, store and throw away your medicines at www.disposemymeds.org.          This list is accurate as of: 12/27/17  11:15 AM.  Always use your most recent med list.                   Brand Name Dispense Instructions for use Diagnosis    ASTAXANTHIN PO           chlorhexidine 0.12 % solution    PERIDEX          citalopram 40 MG tablet    celeXA    90 tablet    Take 1 tablet (40 mg) by mouth daily    Major depressive disorder, recurrent episode, moderate (H)       cyclobenzaprine 10 MG tablet    FLEXERIL    30 tablet    Take 0.5-1 tablets (5-10 mg) by mouth 3 times daily as needed for muscle spasms    Sacroiliac joint dysfunction, Left-sided low back pain without sciatica       levothyroxine 112 MCG tablet    SYNTHROID/LEVOTHROID    90 tablet    Take 1 tablet (112 mcg) by mouth daily    Hypothyroidism, unspecified type       meloxicam 15 MG tablet    MOBIC    30 tablet    Take 1 tablet (15 mg) by mouth daily    S/P left unicompartmental knee replacement, Effusion of left knee       MULTIVITAL PO           omeprazole 20 MG tablet     90 tablet    Take 1 tablet (20 mg) by mouth daily Take 30-60 minutes before a meal.    Epigastric pain       * SUBOXONE 8-2 MG Subl sublingual tablet   Generic drug:  buprenorphine-naloxone     60 each    Place 1 tablet under the tongue 2 times daily.    Chemical dependency (H)       * buprenorphine-naloxone 8-2 MG Subl sublingual tablet    SUBOXONE          traMADol 50 MG tablet    ULTRAM    30 tablet    Take 1-2 tablets ( mg) by mouth every 6 hours as needed for pain Give 1 tab for pain 1-5, give 2 tabs for pain 5-10. Indication: Pain    S/P left unicompartmental knee replacement, Effusion of left knee       * Notice:  This list has 2 medication(s) that are the same as other medications prescribed for you. Read the directions carefully, and ask your doctor or other care provider to review them with you.

## 2017-12-27 NOTE — PATIENT INSTRUCTIONS
Encounter Diagnoses   Name Primary?     S/P left unicompartmental knee replacement Yes     Effusion of left knee      Hemarthrosis, left knee      Rest, ice and elevate above heart level as needed for pain control  1. We talked about your knee that has been swelling.  2. Dr. Jiménez did get 150cc of bloody fluid out of the knee.  3. Your knee looks excellent, the replacement is ok.  There is some arthritis developing on the other side, but overall it looks good.  4.  Without an injury we are not.  5. You have a brace already.  We discussed a brace that may help you; but we decided to hold off on that today.  We did an Ace wrap today after draining it.  6. I ordered Mobic 15mg once a day times 2 weeks, then take as needed.  Stop this medication if you have any abdominal pain with it.  I sent this to your pharmacy.      7. We also talked about some Ultram however you are on Suboxone and we would not want to give you this medication when you are on that.  8. We decided on a aspiration today to help with your swelling.  We were able to get 130 cc of dark bloody fluid off your knee.  9. We discussed a MRI study that may give us more information but we decided to hold off on that today.    10.  We dispensed an Ace wrap today to help keep the swelling down.  Wear this as needed.  11.  Follow up with Dr. Blood as needed.  He does unicompartmental knee replacements.  We do not.  You could also follow up with Dr. Carvalho who did the replacement before.  [employer] may offer reliable information regarding your diagnosis and treatment plan.    THANK YOU for coming in today. If you receive a survey via [employer] or mail please let us know if there was anything you especially appreciated today or if there is any way we can improve our clinic. We appreciate your input.    GENERAL INFORMATION:  Our hours are:  Monday :     Clinic 7:30 AM-430 PM ([facility])  Tuesday:      Operating Room All Day  ([facility])  Wednesday: Clinic 7:30 AM - 11:15 AM ([facility])             Clinic 1:00 PM - 4:00PM ([facility])  Thursday:     Administrative Day  Friday:          Clinic 7:30 AM - 11:15 AM ([facility])            Clinic 1:00 PM - 4:00 PM ([facility])      Bone and Joint Service Line for any issues or concerns: [phone]      We are not in the office Thursdays. Therefore non- urgent calls and medical messages received on Thursday will be addressed when we are back in the office on Wednesday. Urgent matters will be reviewed and addressed by one of our partners in the office as needed.    If lab work was done today as part of your evaluation you will generally be contacted via [employer]), mail, or phone with the results within 1-5 days. If there is an alarming result we will contact you by phone. Lab results come back at varying times, I generally wait until all labs are resulted before making comments on results. Please note labs are automatically released to [employer] (if you have signed up for it) once available-at times you may see these prior to my having a chance to review them as well.    If you need refills please contact your pharmacist. They will send a refill request to me to review. Please allow 3 business days for us to process all refill requests. All narcotic refills should be handled in the clinic at the time of your visit.

## 2017-12-27 NOTE — PROGRESS NOTES
Martin Armstrong is a 62 year old male who is seen in consultation at the request of Nancy Sen APRN CNP .  History of Present illness:  Martin presents for evaluation of:  1.) left knee  2.)   Onset: mid and December  Symptoms brought on by nothing.   Character:  dull ache.    Progression of symptoms:  intermittent.    Previous similar pain: YES.   Pain Level:  0/10.   Previous treatments:  ice and NSAID - naproxen. Was aspirated on the 21st  Currently on Blood thinners? no  Diagnosis of Diabetes? No

## 2017-12-27 NOTE — LETTER
12/27/2017         RE: Martin Armstrong  [address] [facility] [id number]        Dear Colleague,    Thank you for referring your patient, Martin Armstrong, to the [facility]. Please see a copy of my visit note below.    Martin Armstrong is a 62 year old male who is seen in consultation at the request of Nancy Sen APRN CNP .  History of Present illness:  Martin presents for evaluation of:  1.) left knee  2.)   Onset: mid and December  Symptoms brought on by nothing.   Character:  dull ache.    Progression of symptoms:  intermittent.    Previous similar pain: YES.   Pain Level:  0/10.   Previous treatments:  ice and NSAID - naproxen. Was aspirated on the 21st  Currently on Blood thinners? no  Diagnosis of Diabetes? 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  He enjoys shooting his gun is playing golf doing workouts and playing with his grandkids as well as biking.    He is being seen for   Chief Complaints and History of Present Illnesses   Patient presents with     Consult     second opinion per  Nancy Sen APRN CNP          History of Present Illness:   Mechanism of Injury: No specific injury that he recalls  Location: Left lateral knee  Duration of Pain: For approximately 2 weeks.  Patient states that about 2 weeks ago he was washing his hands at work and all of a sudden he felt his knee started to swell up, he states it felt like there was somebody injecting his knee with fluid.  Patient later had difficulty walking in the next day he had an appointment with Dr. Jiménez who drained 150 cc of bloody fluid out of his knee.  Rating of Pain: Minimal pain  Pain Quality: Achy and stiffness because of swelling  Pain is better with: Aspiration of the knee and rest  Pain is worse with: Activity or deep bending  Treatment so far consists of: Patient had an aspiration of his left  knee by Dr. Jiménez on 12/21/2017, 150 cc of bloody fluid was drained at that time.  He had also done icing and naproxen.  No other treatments.  Associated Features: Swelling of the left knee  Prior history of related problems: Patient had a unicompartmental knee replacement approximately 6 years ago.  Patient states it was somewhere between 2005 and 2007 by ABHISHEK Carvalho MD at [facility].  He says that after the replacement he has done very well but did have to have his knee drained once after the replacement and it was duarte joint fluid at that time.  Pain is Limiting: Activity and ambulation and deep bending  Here to: Get a second opinion  The Pain Has: Patient denies any catching or locking or giving out of the knee.  Same.  Additional History: Patient has some posterior pain secondary to swelling in the back of the knee.  Patient denies any injury fall trauma.  Patient denies any real problem with the replacement since it was done.  Patient states that he has been working 50-60 hours per week and he has been feeling the best he has felt recently.  The swelling is the only problem.    Patient's past medical, surgical, social and family histories reviewed.     Past Medical History:   Diagnosis Date     Chemical dependency (H)     remission- 2008, suboxone     Diverticulosis      DJD (degenerative joint disease) of knee          Past Surgical History:   Procedure Laterality Date     ARTHROPLASTY KNEE UNICOMPARTMENT  11/8/2011    Procedure:ARTHROPLASTY KNEE UNICOMPARTMENT; LEFT KNEE UNICOMPARTMENT ARTHROPLASTY (FREEMAN)^; Surgeon:SHABBIR CARVALHO; Location:[facility] OR     ARTHROSCOPY KNEE RT/LT  1/2006    left     COLONOSCOPY  3/27/08    due in 2013       Medications:    Current Outpatient Prescriptions on File Prior to Visit:  levothyroxine (SYNTHROID/LEVOTHROID) 112 MCG tablet Take 1 tablet (112 mcg) by mouth daily   buprenorphine-naloxone (SUBOXONE) 8-2 MG SUBL sublingual tablet    citalopram (CELEXA) 40  "MG tablet Take 1 tablet (40 mg) by mouth daily   cyclobenzaprine (FLEXERIL) 10 MG tablet Take 0.5-1 tablets (5-10 mg) by mouth 3 times daily as needed for muscle spasms   omeprazole 20 MG tablet Take 1 tablet (20 mg) by mouth daily Take 30-60 minutes before a meal.   ASTAXANTHIN PO    chlorhexidine (PERIDEX) 0.12 % solution    Multiple Vitamins-Minerals (MULTIVITAL PO)    buprenorphine-naloxone (SUBOXONE) 8-2 MG SUBL Place 1 tablet under the tongue 2 times daily.     No current facility-administered medications on file prior to visit.     No Known Allergies    Social History     Occupational History      [employer]     [employer]     Social History Main Topics     Smoking status: Never Smoker     Smokeless tobacco: Never Used     Alcohol use No     Drug use: No      Comment: h/o chem dep due to post -op analgesics 1-06     Sexual activity: Yes     Partners: Female      Comment: no protection       Family History   Problem Relation Age of Onset     Family History Negative Father      CANCER Mother      Eye Disorder Paternal Grandmother      Asthma No family hx of      C.A.D. No family hx of      DIABETES No family hx of      Hypertension No family hx of      CEREBROVASCULAR DISEASE No family hx of      Breast Cancer No family hx of      Cancer - colorectal No family hx of      Prostate Cancer No family hx of        REVIEW OF SYSTEMS  10 point review systems performed otherwise negative as noted as per history of present illness.    Physical Exam:  Vitals: Temp 97.7  F (36.5  C)  Ht 1.759 m (5' 9.25\")  Wt 83 kg (183 lb)  BMI 26.83 kg/m2  BMI= Body mass index is 26.83 kg/(m^2).    Constitutional: healthy, alert and no acute distress   Psychiatric: mentation appears normal and affect normal/bright  NEURO: no focal deficits, CMS intact left lower extremity  RESP: Normal with easy respirations and no use of accessory muscles to breathe, no audible wheezing or retractions  CV: Calf soft and nontender to palpation, leg warm "   SKIN: No erythema, rashes, excoriation, or breakdown. No evidence of infection.   MUSCULOSKELETAL:    INSPECTION of left knee: Slight effusion noted compared to the contralateral side.  No gross deformities, erythema, ecchymosis, atrophy or fasciculations.     PALPATION: No tenderness on palpation of the medial, lateral, anterior and posterior portion of the knee. No specific joint line tenderness. No increased warmth.     ROM: Extension full, flexion to approximately 125 . All range of motion without catching, locking or pain.       STRENGTH: 5 out of 5 quad and hamstring strength.     SPECIAL TEST: Patient has a negative Lachman's negative drawer sign. Patient's knee is stable to varus and valgus stress at 30  of flexion. Patient has a negative Tierney's.   GAIT: non-antalgic  Lymph: no palpable lymph nodes    Diagnostic Modalities:                        XR Knee Left 3 Views    Narrative    LEFT KNEE THREE VIEWS  12/21/2017 10:43 AM     HISTORY:  Status post left partial knee replacement.    COMPARISON: 10/12/2006      Impression    IMPRESSION: Medial compartment arthroplasty. Mild patellar and lateral  compartment spurring. Prominent joint effusion.    LATESHA NIETO MD     We agree with the above reading  Independent visualization of the images was performed.    Impression: 1.  Approximately 6 years status post left knee unicompartmental knee replacement.  2.  Left knee hemarthrosis    Plan:  All of the above pertinent physical exam and imaging modalities findings was reviewed with Martin.                                          INJECTION PROCEDURE:  The patient was counseled about an aspiration and injection, including discussion of risks (including infection), contents of the procedure, rationale for performing the procedure, and expected benefits of the aspiration and injection. The patient was placed in the supine position. The skin was prepped with alcohol and betadine and then utilizing sterile technique  an aspiration and injection of the left knee joint from the superior lateral approach. I used marques chloride spray prior to doing the aspiration. I injected 5 ml of 2% lidocaine then removed the needle.  After good anesthetic was achieved, I then used marques chloride again and put in an 18 gauge needed in the same location.  I moved the needle around to get all the fluid I could.  I aspirated 130 ml of dark red bloody fluid.  The patient tolerated the aspiration and injection well, and there were no complications. The injection site was cleaned and covered with a Band-Aid. The injection was performed by JIMBO Camejo     Patient Instructions:  1. We talked about your knee that has been swelling.  2. Dr. Jiménez did get 150cc of bloody fluid out of the knee.  3. Your knee looks excellent, the replacement is ok.  There is some arthritis developing on the other side, but overall it looks good.  4.  Without an injury we are not.  5. You have a brace already.  We discussed a brace that may help you; but we decided to hold off on that today.  We did an Ace wrap today after draining it.  6. I ordered Mobic 15mg once a day times 2 weeks, then take as needed.  Stop this medication if you have any abdominal pain with it.  I sent this to your pharmacy.      7. We also talked about some Ultram however you are on Suboxone and we would not want to give you this medication when you are on that.  8. We decided on a aspiration today to help with your swelling.  We were able to get 130 cc of dark bloody fluid off your knee.  9. We discussed a MRI study that may give us more information but we decided to hold off on that today.    10.  We dispensed an Ace wrap today to help keep the swelling down.  Wear this as needed.  11.  Follow up with Dr. Blood as needed.  He does unicompartmental knee replacements.  We do not.  You could also follow up with Dr. Carvalho who did the replacement before.  Re-x-ray on return: No    Scribed by Nomi  DIPAK Houston on 12/27/2017 at 11:30 AM, based on Dr. Anna Bennett's statements to me.    This note was dictated with [facility].    DIPAK Garcia MD      Again, thank you for allowing me to participate in the care of your patient.        Sincerely,        Anna Bennett MD

## 2018-03-05 DIAGNOSIS — F33.1 MAJOR DEPRESSIVE DISORDER, RECURRENT EPISODE, MODERATE (H): ICD-10-CM

## 2018-03-06 RX ORDER — CITALOPRAM HYDROBROMIDE 40 MG/1
40 TABLET ORAL DAILY
Qty: 30 TABLET | Refills: 0 | Status: SHIPPED | OUTPATIENT
Start: 2018-03-06 | End: 2018-03-16

## 2018-03-06 NOTE — TELEPHONE ENCOUNTER
Celexa:  Routing refill request to provider for review/approval because:  No documentation of depression noted by physician within 6 months.    Bren Robins, RN, BSN

## 2018-03-12 ENCOUNTER — TELEPHONE (OUTPATIENT)
Dept: FAMILY MEDICINE | Facility: CLINIC | Age: 63
End: 2018-03-12

## 2018-03-12 NOTE — TELEPHONE ENCOUNTER
Panel Management Review      Patient has the following on his problem list:     Depression / Dysthymia review    Measure:  Needs PHQ-9 score of 4 or less during index window.  Administer PHQ-9 and if score is 5 or more, send encounter to provider for next steps.        PHQ-9 SCORE 1/31/2017 6/29/2017 12/19/2017   Total Score - - -   Total Score MyChart - 6 (Mild depression) -   Total Score 6 6 5       If PHQ-9 recheck is 5 or more, route to provider for next steps.    Patient is due for:  PHQ9 and DAP      Composite cancer screening  Chart review shows that this patient is due/due soon for the following None  Summary:    Patient is due/failing the following:   DAP, PHQ9 and TSH    Action needed:   Patient needs office visit for Mood check.    Type of outreach:    Phone, left message for patient to call back.     Questions for provider review:    None                                                                                                                                    Haley Oscar MA        Chart routed to Care Team .

## 2018-03-13 NOTE — PROGRESS NOTES
SUBJECTIVE:   Martin Armstrong is a 62 year old male who presents to clinic today for the following health issues:      History of Present Illness     Depression & Anxiety Follow-up:     Depression/Anxiety:  Depression only    Status since last visit::  Stable    Other associated symptoms of depression::  None    Significant life event::  No    Current substance use::  None, Bath Salts and Inhalants    Anxiety/Panic symptoms::  No       Today's PHQ-9         PHQ-9 Total Score:         PHQ-9 Q9 Suicidal ideation:       Thoughts of suicide or self harm:      Self-harm Plan:        Self-harm Action:          Safety concerns for self or others:            Diet:  Regular (no restrictions)  Frequency of exercise:  2-3 days/week  Duration of exercise:  45-60 minutes  Taking medications regularly:  Yes  Medication side effects:  None  Additional concerns today:  No    Hypothyroidism Follow-up      Since last visit, patient describes the following symptoms: Weight stable, no hair loss, no skin changes, no constipation, no loose stools      Amount of exercise or physical activity: at work    Problems taking medications regularly: No    Medication side effects: none    Diet: regular (no restrictions)      Depression and Anxiety Follow-Up    Status since last visit: No change    Other associated symptoms:None    Complicating factors:     Significant life event: No     Current substance abuse: None    PHQ-9 6/29/2017 12/19/2017 3/16/2018   Total Score 6 5 6   Q9: Suicide Ideation Several days Several days Several days     MINA-7 SCORE 1/31/2017 6/29/2017 3/16/2018   Total Score - - -   Total Score - 2 (minimal anxiety) 2 (minimal anxiety)   Total Score 5 2 2     Answers for HPI/ROS submitted by the patient on 3/16/2018   PHQ-2 Score: 2    no current SI, patient is futuristic  PHQ-9  English  PHQ-9   Any Language  MINA-7  Suicide Assessment Five-step Evaluation and Treatment (SAFE-T)as documentedProblem list and histories reviewed  & adjusted, as indicated.  Additional history: as documented    He reports feeling down about going to work and not being able to see his kids regularly or see his grand kids grow up. Reports not keeping in contact with his ex-wife and he doesn't want to know anything about her.    He reports getting an aortic, gastric, and bone density screening flyer. He and his wife both participated in it and he was wondering if it was a waste of money.    He reports losing weight and is looking for information on his thyroid function. Reports not having the best diet and wants to work on eating healthier. He is interested in decreasing his Suboxone dose until he is eventually off it completely due to the high price. When he took more of the Suboxone he didn't feel well. He reports that now he feels his anger is well controlled and hasn't gotten mad lately. He notes that when his knee starts to hurt he gets more grumpy.    He reports that his left leg/knee started hurting recently and has been having difficulty bending over. He went back to his orthopedic surgeon and he was told to compress it and take tylenol. He has had the knee drained of the fluid buildup from his arthritis repeatedly. He reports doing low impact exercises such as riding a stationary bike. He was told his next option is a complete knee replacement since he has only had a partial knee replacement in the past.    He also reports his hands hurting from his arthritis.      Patient Active Problem List   Diagnosis     CARDIOVASCULAR SCREENING; LDL GOAL LESS THAN 160     Chemical dependency (H)     DJD (degenerative joint disease) of knee     Abnormal gait     Advanced directives, counseling/discussion     Diverticulosis     Hypothyroidism     Major depressive disorder     Osteoarthritis of left hand     Status post left partial knee replacement     Ganglion, finger of right hand     Past Surgical History:   Procedure Laterality Date     ARTHROPLASTY KNEE  UNICOMPARTMENT  11/8/2011    Procedure:ARTHROPLASTY KNEE UNICOMPARTMENT; LEFT KNEE UNICOMPARTMENT ARTHROPLASTY (FREEMAN)^; Surgeon:SHABBIR RADER; Location:[facility] OR     ARTHROSCOPY KNEE RT/LT  1/2006    left     COLONOSCOPY  3/27/08    due in 2013       Social History   Substance Use Topics     Smoking status: Never Smoker     Smokeless tobacco: Never Used     Alcohol use No     Family History   Problem Relation Age of Onset     Family History Negative Father      CANCER Mother      Eye Disorder Paternal Grandmother      Asthma No family hx of      C.A.D. No family hx of      DIABETES No family hx of      Hypertension No family hx of      CEREBROVASCULAR DISEASE No family hx of      Breast Cancer No family hx of      Cancer - colorectal No family hx of      Prostate Cancer No family hx of          Current Outpatient Prescriptions   Medication Sig Dispense Refill     levothyroxine (SYNTHROID/LEVOTHROID) 100 MCG tablet Take 1 tablet (100 mcg) by mouth daily 90 tablet 0     citalopram (CELEXA) 40 MG tablet Take 1 tablet (40 mg) by mouth daily 90 tablet 2     buprenorphine-naloxone (SUBOXONE) 8-2 MG SUBL sublingual tablet        ASTAXANTHIN PO        [DISCONTINUED] levothyroxine (SYNTHROID/LEVOTHROID) 112 MCG tablet Take 1 tablet (112 mcg) by mouth daily 90 tablet 0     cyclobenzaprine (FLEXERIL) 10 MG tablet Take 0.5-1 tablets (5-10 mg) by mouth 3 times daily as needed for muscle spasms (Patient not taking: Reported on 3/16/2018) 30 tablet 0     omeprazole 20 MG tablet Take 1 tablet (20 mg) by mouth daily Take 30-60 minutes before a meal. (Patient not taking: Reported on 3/16/2018) 90 tablet 1     chlorhexidine (PERIDEX) 0.12 % solution   2     Multiple Vitamins-Minerals (MULTIVITAL PO)        No Known Allergies  Recent Labs   Lab Test  03/16/18   1121  12/19/17   1653   02/06/17   0855   11/21/14   1454   04/12/13   0805   LDL   --    --    --   76   --    --    --   125   HDL   --    --    --   43   --    --    --    "47   TRIG   --    --    --   177*   --    --    --   78   CR   --    --    --    --    --   0.82   --   0.87   GFRESTIMATED   --    --    --    --    --   >90  Non  GFR Calc     --   >90   GFRESTBLACK   --    --    --    --    --   >90   GFR Calc     --   >90   POTASSIUM   --    --    --    --    --   3.7   --   3.8   TSH  0.37*  4.88*   < >  Canceled, Test credited   Test canceled by physician  CORRECTED ON 02/06 AT 1146: PREVIOUSLY REPORTED AS 15.48     < >  11.66*   < >  12.40*    < > = values in this interval not displayed.      BP Readings from Last 3 Encounters:   03/16/18 120/66   12/19/17 136/82   06/29/17 132/80    Wt Readings from Last 3 Encounters:   03/16/18 180 lb 6.4 oz (81.8 kg)   12/27/17 183 lb (83 kg)   12/21/17 183 lb (83 kg)        ROS:  Constitutional, HEENT, cardiovascular, pulmonary, GI, , musculoskeletal, neuro, skin, endocrine and psych systems are negative, except as otherwise noted.    Denies chest pain or SOB.    This document serves as a record of the services and decisions personally performed and made by Nancy Sen CNP. It was created on his/her behalf by Rain Ferris, trained medical scribe. The creation of this document is based the provider's statements to the medical scribes.    Scribe Rain Ferris 10:51 AM, March 16, 2018    OBJECTIVE:     /66  Pulse 78  Temp 97.1  F (36.2  C) (Temporal)  Resp 18  Ht 5' 9\" (1.753 m)  Wt 180 lb 6.4 oz (81.8 kg)  SpO2 96%  BMI 26.64 kg/m2  Body mass index is 26.64 kg/(m^2).  GENERAL: healthy, alert and no distress  HENT: ear canals and TM's normal, nose and mouth without ulcers or lesions  NECK: no adenopathy, no asymmetry, masses, or scars and thyroid normal to palpation  RESP: lungs clear to auscultation - no rales, rhonchi or wheezes  CV: regular rate and rhythm, normal S1 S2, no S3 or S4, no murmur, click or rub, no peripheral edema and peripheral pulses strong  PSYCH: mentation appears normal, " affect normal/bright, fatigued and judgement and insight intact    Diagnostic Test Results:  No results found for this or any previous visit (from the past 24 hour(s)).    ASSESSMENT/PLAN:       ICD-10-CM    1. Major depressive disorder, recurrent episode, moderate (H) F33.1 citalopram (CELEXA) 40 MG tablet     T4 free   2. Hypothyroidism, unspecified type E03.9 TSH with free T4 reflex     levothyroxine (SYNTHROID/LEVOTHROID) 100 MCG tablet     TSH with free T4 reflex   3. Screening for prostate cancer Z12.5 Prostate spec antigen screen   4. Lipid screening Z13.220 Lipid panel reflex to direct LDL Fasting   5. Screening for diabetes mellitus Z13.1 Glucose   Reviewed depression and medications at length. Multiple contributing factors to his mood as reported in HPI. Discussed continue current medication at same dose and monitor stressors at home. Dose stable.   Medication refill done today.    TSH- due for labs due medication adjustment in Dec.     Talk to CD specialist about tapering off Suboxone as is a patient goal.    Keep up with low impact exercise and hand movement to help with arthritis pain. Recommend healthy eating diet.    Follow up with PCP in 6 months or as needed.    The information in this document, created by the medical scribe for me, accurately reflects the services I personally performed and the decisions made by me. I have reviewed and approved this document for accuracy prior to leaving the patient care area.  Nancy Sen CNP  10:51 AM, 03/16/18    BETH [facility]

## 2018-03-16 ENCOUNTER — OFFICE VISIT (OUTPATIENT)
Dept: FAMILY MEDICINE | Facility: CLINIC | Age: 63
End: 2018-03-16
Payer: COMMERCIAL

## 2018-03-16 VITALS
RESPIRATION RATE: 18 BRPM | HEART RATE: 78 BPM | DIASTOLIC BLOOD PRESSURE: 66 MMHG | BODY MASS INDEX: 26.72 KG/M2 | SYSTOLIC BLOOD PRESSURE: 120 MMHG | TEMPERATURE: 97.1 F | OXYGEN SATURATION: 96 % | HEIGHT: 69 IN | WEIGHT: 180.4 LBS

## 2018-03-16 DIAGNOSIS — Z13.1 SCREENING FOR DIABETES MELLITUS: ICD-10-CM

## 2018-03-16 DIAGNOSIS — Z12.5 SCREENING FOR PROSTATE CANCER: ICD-10-CM

## 2018-03-16 DIAGNOSIS — F33.1 MAJOR DEPRESSIVE DISORDER, RECURRENT EPISODE, MODERATE (H): Primary | ICD-10-CM

## 2018-03-16 DIAGNOSIS — Z13.220 LIPID SCREENING: ICD-10-CM

## 2018-03-16 DIAGNOSIS — E03.9 HYPOTHYROIDISM, UNSPECIFIED TYPE: ICD-10-CM

## 2018-03-16 LAB
PSA SERPL-ACNC: 0.18 UG/L (ref 0–4)
T4 FREE SERPL-MCNC: 1.2 NG/DL (ref 0.76–1.46)
TSH SERPL DL<=0.005 MIU/L-ACNC: 0.37 MU/L (ref 0.4–4)

## 2018-03-16 PROCEDURE — G0103 PSA SCREENING: HCPCS | Performed by: NURSE PRACTITIONER

## 2018-03-16 PROCEDURE — 84439 ASSAY OF FREE THYROXINE: CPT | Performed by: NURSE PRACTITIONER

## 2018-03-16 PROCEDURE — 36415 COLL VENOUS BLD VENIPUNCTURE: CPT | Performed by: NURSE PRACTITIONER

## 2018-03-16 PROCEDURE — 99214 OFFICE O/P EST MOD 30 MIN: CPT | Performed by: NURSE PRACTITIONER

## 2018-03-16 PROCEDURE — 84443 ASSAY THYROID STIM HORMONE: CPT | Performed by: NURSE PRACTITIONER

## 2018-03-16 RX ORDER — CITALOPRAM HYDROBROMIDE 40 MG/1
40 TABLET ORAL DAILY
Qty: 90 TABLET | Refills: 2 | Status: SHIPPED | OUTPATIENT
Start: 2018-03-16 | End: 2019-01-03

## 2018-03-16 ASSESSMENT — ANXIETY QUESTIONNAIRES
4. TROUBLE RELAXING: NOT AT ALL
GAD7 TOTAL SCORE: 2
1. FEELING NERVOUS, ANXIOUS, OR ON EDGE: SEVERAL DAYS
GAD7 TOTAL SCORE: 2
5. BEING SO RESTLESS THAT IT IS HARD TO SIT STILL: NOT AT ALL
3. WORRYING TOO MUCH ABOUT DIFFERENT THINGS: NOT AT ALL
7. FEELING AFRAID AS IF SOMETHING AWFUL MIGHT HAPPEN: NOT AT ALL
7. FEELING AFRAID AS IF SOMETHING AWFUL MIGHT HAPPEN: NOT AT ALL
6. BECOMING EASILY ANNOYED OR IRRITABLE: SEVERAL DAYS
2. NOT BEING ABLE TO STOP OR CONTROL WORRYING: NOT AT ALL
GAD7 TOTAL SCORE: 2

## 2018-03-16 ASSESSMENT — PATIENT HEALTH QUESTIONNAIRE - PHQ9
10. IF YOU CHECKED OFF ANY PROBLEMS, HOW DIFFICULT HAVE THESE PROBLEMS MADE IT FOR YOU TO DO YOUR WORK, TAKE CARE OF THINGS AT HOME, OR GET ALONG WITH OTHER PEOPLE: NOT DIFFICULT AT ALL
SUM OF ALL RESPONSES TO PHQ QUESTIONS 1-9: 6
SUM OF ALL RESPONSES TO PHQ QUESTIONS 1-9: 6

## 2018-03-16 ASSESSMENT — PAIN SCALES - GENERAL: PAINLEVEL: NO PAIN (0)

## 2018-03-16 NOTE — LETTER
My Depression Action Plan  Name: Martin Armstrong   Date of Birth 1955  Date: 3/13/2018    My doctor: Nancy Sen   My clinic: [facility]  [id number] [place] [address], [address]  [place] [id number] [phone]          GREEN    ZONE   Good Control    What it looks like:     Things are going generally well. You have normal up s and down s. You may even feel depressed from time to time, but bad moods usually last less than a day.   What you need to do:  1. Continue to care for yourself (see self care plan)  2. Check your depression survival kit and update it as needed  3. Follow your physician s recommendations including any medication.  4. Do not stop taking medication unless you consult with your physician first.           YELLOW         ZONE Getting Worse    What it looks like:     Depression is starting to interfere with your life.     It may be hard to get out of bed; you may be starting to isolate yourself from others.    Symptoms of depression are starting to last most all day and this has happened for several days.     You may have suicidal thoughts but they are not constant.   What you need to do:     1. Call your care team, your response to treatment will improve if you keep your care team informed of your progress. Yellow periods are signs an adjustment may need to be made.     2. Continue your self-care, even if you have to fake it!    3. Talk to someone in your support network    4. Open up your depression survival kit           RED    ZONE Medical Alert - Get Help    What it looks like:     Depression is seriously interfering with your life.     You may experience these or other symptoms: You can t get out of bed most days, can t work or engage in other necessary activities, you have trouble taking care of basic hygiene, or basic responsibilities, thoughts of suicide or death that will not go away, self-injurious behavior.     What you need to do:  1. Call your care team and  request a same-day appointment. If they are not available (weekends or after hours) call your local crisis line, emergency room or 911.      Electronically signed by: Rodger Hughes, March 13, 2018    Depression Self Care Plan / Survival Kit    Self-Care for Depression  Here s the deal. Your body and mind are really not as separate as most people think.  What you do and think affects how you feel and how you feel influences what you do and think. This means if you do things that people who feel good do, it will help you feel better.  Sometimes this is all it takes.  There is also a place for medication and therapy depending on how severe your depression is, so be sure to consult with your medical provider and/ or Behavioral Health Consultant if your symptoms are worsening or not improving.     In order to better manage my stress, I will:    Exercise  Get some form of exercise, every day. This will help reduce pain and release endorphins, the  feel good  chemicals in your brain. This is almost as good as taking antidepressants!  This is not the same as joining a gym and then never going! (they count on that by the way ) It can be as simple as just going for a walk or doing some gardening, anything that will get you moving.      Hygiene   Maintain good hygiene (Get out of bed in the morning, Make your bed, Brush your teeth, Take a shower, and Get dressed like you were going to work, even if you are unemployed).  If your clothes don't fit try to get ones that do.    Diet  I will strive to eat foods that are good for me, drink plenty of water, and avoid excessive sugar, caffeine, alcohol, and other mood-altering substances.  Some foods that are helpful in depression are: complex carbohydrates, B vitamins, flaxseed, fish or fish oil, fresh fruits and vegetables.    Psychotherapy  I agree to participate in Individual Therapy (if recommended).    Medication  If prescribed medications, I agree to take them.  Missing doses can  result in serious side effects.  I understand that drinking alcohol, or other illicit drug use, may cause potential side effects.  I will not stop my medication abruptly without first discussing it with my provider.    Staying Connected With Others  I will stay in touch with my friends, family members, and my primary care provider/team.    Use your imagination  Be creative.  We all have a creative side; it doesn t matter if it s oil painting, sand castles, or mud pies! This will also kick up the endorphins.    Witness Beauty  (AKA stop and smell the roses) Take a look outside, even in mid-winter. Notice colors, textures. Watch the squirrels and birds.     Service to others  Be of service to others.  There is always someone else in need.  By helping others we can  get out of ourselves  and remember the really important things.  This also provides opportunities for practicing all the other parts of the program.    Humor  Laugh and be silly!  Adjust your TV habits for less news and crime-drama and more comedy.    Control your stress  Try breathing deep, massage therapy, biofeedback, and meditation. Find time to relax each day.     My support system    Clinic Contact:  Phone number:    Contact 1:  Phone number:    Contact 2:  Phone number:    Faith/:  Phone number:    Therapist:  Phone number:    Local crisis center:    Phone number:    Other community support:  Phone number:

## 2018-03-16 NOTE — MR AVS SNAPSHOT
After Visit Summary   3/16/2018    Mratin Armstrong    MRN: [id number]           Patient Information     Date Of Birth          1955        Visit Information        Provider Department      3/16/2018 10:40 AM Nancy Sen APRN CNP [facility]        Today's Diagnoses     Major depressive disorder, recurrent episode, moderate (H)    -  1    Hypothyroidism, unspecified type        Screening for prostate cancer        Lipid screening        Screening for diabetes mellitus           Follow-ups after your visit        Future tests that were ordered for you today     Open Future Orders        Priority Expected Expires Ordered    Glucose Routine  5/15/2018 3/16/2018    Lipid panel reflex to direct LDL Fasting Routine  5/15/2018 3/16/2018            Who to contact     If you have questions or need follow up information about today's clinic visit or your schedule please contact [facility] [place] directly at [phone].  Normal or non-critical lab and imaging results will be communicated to you by "[employer]hart, letter or phone within 4 business days after the clinic has received the results. If you do not hear from us within 7 days, please contact the clinic through "[employer]hart or phone. If you have a critical or abnormal lab result, we will notify you by phone as soon as possible.  Submit refill requests through [employer]gram or call your pharmacy and they will forward the refill request to us. Please allow 3 business days for your refill to be completed.          Additional Information About Your Visit        MyChart Information     copygram gives you secure access to your electronic health record. If you see a primary care provider, you can also send messages to your care team and make appointments. If you have questions, please call your primary care clinic.  If you do not have a primary care provider, please call [phone] and they will assist you.        Care EveryWhere ID     This is your  "Care EveryWhere ID. This could be used by other organizations to access your [facility] records  [id number]        Your Vitals Were     Pulse Temperature Respirations Height Pulse Oximetry BMI (Body Mass Index)    78 97.1  F (36.2  C) (Temporal) 18 5' 9\" (1.753 m) 96% 26.64 kg/m2       Blood Pressure from Last 3 Encounters:   03/16/18 120/66   12/19/17 136/82   06/29/17 132/80    Weight from Last 3 Encounters:   03/16/18 180 lb 6.4 oz (81.8 kg)   12/27/17 183 lb (83 kg)   12/21/17 183 lb (83 kg)              We Performed the Following     DEPRESSION ACTION PLAN (DAP)     Prostate spec antigen screen     TSH with free T4 reflex          Today's Medication Changes          These changes are accurate as of 3/16/18  3:22 PM.  If you have any questions, ask your nurse or doctor.               These medicines have changed or have updated prescriptions.        Dose/Directions    buprenorphine-naloxone 8-2 MG Subl sublingual tablet   Commonly known as:  SUBOXONE   This may have changed:  Another medication with the same name was removed. Continue taking this medication, and follow the directions you see here.   Changed by:  Nancy Sen APRN CNP        Refills:  0       citalopram 40 MG tablet   Commonly known as:  celeXA   This may have changed:  additional instructions   Used for:  Major depressive disorder, recurrent episode, moderate (H)   Changed by:  Nancy Sen APRN CNP        Dose:  40 mg   Take 1 tablet (40 mg) by mouth daily   Quantity:  90 tablet   Refills:  2         Stop taking these medicines if you haven't already. Please contact your care team if you have questions.     meloxicam 15 MG tablet   Commonly known as:  MOBIC   Stopped by:  Nancy Sen APRN CNP           traMADol 50 MG tablet   Commonly known as:  ULTRAM   Stopped by:  Nancy Sen APRN CNP                Where to get your medicines      These medications were sent to [facility] [address] [place] [address] [facility]  " [address] [facility] [address]     Phone:  [phone]     citalopram 40 MG tablet                Primary Care Provider Office Phone # Fax #    Nancy CARTY BETH Sen [facility] [phone] [address] [facility]  [place] [address]        Equal Access to Services     LUIZ HARO : Hadii aad ku hadasho Soomaali, waaxda luqadaha, qaybta kaalmada adeegyada, waxay idiin hayaan adesheng khjensh lamatthew . [facility] [phone].    ATENCIÓN: Si habla español, tiene a ba disposición servicios gratuitos de asistencia lingüística. Aneudy al [phone].    We comply with applicable federal civil rights laws and [place] laws. We do not discriminate on the basis of race, color, national origin, age, disability, sex, sexual orientation, or gender identity.            Thank you!     Thank you for choosing [facility]  for your care. Our goal is always to provide you with excellent care. Hearing back from our patients is one way we can continue to improve our services. Please take a few minutes to complete the written survey that you may receive in the mail after your visit with us. Thank you!             Your Updated Medication List - Protect others around you: Learn how to safely use, store and throw away your medicines at www.disposemymeds.org.          This list is accurate as of 3/16/18  3:22 PM.  Always use your most recent med list.                   Brand Name Dispense Instructions for use Diagnosis    ASTAXANTHIN PO           buprenorphine-naloxone 8-2 MG Subl sublingual tablet    SUBOXONE          chlorhexidine 0.12 % solution    PERIDEX          citalopram 40 MG tablet    celeXA    90 tablet    Take 1 tablet (40 mg) by mouth daily    Major depressive disorder, recurrent episode, moderate (H)       cyclobenzaprine 10 MG tablet    FLEXERIL    30 tablet    Take 0.5-1 tablets (5-10 mg) by mouth 3 times daily as needed for muscle spasms    Sacroiliac joint dysfunction, Left-sided low back pain without  sciatica       levothyroxine 112 MCG tablet    SYNTHROID/LEVOTHROID    90 tablet    Take 1 tablet (112 mcg) by mouth daily    Hypothyroidism, unspecified type       MULTIVITAL PO           omeprazole 20 MG tablet     90 tablet    Take 1 tablet (20 mg) by mouth daily Take 30-60 minutes before a meal.    Epigastric pain

## 2018-03-17 ASSESSMENT — ANXIETY QUESTIONNAIRES: GAD7 TOTAL SCORE: 2

## 2018-03-17 ASSESSMENT — PATIENT HEALTH QUESTIONNAIRE - PHQ9: SUM OF ALL RESPONSES TO PHQ QUESTIONS 1-9: 6

## 2018-03-19 RX ORDER — LEVOTHYROXINE SODIUM 100 UG/1
100 TABLET ORAL DAILY
Qty: 90 TABLET | Refills: 0 | Status: SHIPPED | OUTPATIENT
Start: 2018-03-19 | End: 2018-07-29

## 2018-04-16 ENCOUNTER — OFFICE VISIT (OUTPATIENT)
Dept: FAMILY MEDICINE | Facility: CLINIC | Age: 63
End: 2018-04-16
Payer: COMMERCIAL

## 2018-04-16 VITALS
BODY MASS INDEX: 26.14 KG/M2 | OXYGEN SATURATION: 96 % | SYSTOLIC BLOOD PRESSURE: 124 MMHG | TEMPERATURE: 98.8 F | DIASTOLIC BLOOD PRESSURE: 68 MMHG | WEIGHT: 177 LBS | HEART RATE: 78 BPM

## 2018-04-16 DIAGNOSIS — R59.1 LYMPHADENOPATHY: Primary | ICD-10-CM

## 2018-04-16 DIAGNOSIS — Z12.11 SCREENING FOR COLON CANCER: ICD-10-CM

## 2018-04-16 PROCEDURE — 99213 OFFICE O/P EST LOW 20 MIN: CPT | Performed by: NURSE PRACTITIONER

## 2018-04-16 ASSESSMENT — PATIENT HEALTH QUESTIONNAIRE - PHQ9
10. IF YOU CHECKED OFF ANY PROBLEMS, HOW DIFFICULT HAVE THESE PROBLEMS MADE IT FOR YOU TO DO YOUR WORK, TAKE CARE OF THINGS AT HOME, OR GET ALONG WITH OTHER PEOPLE: NOT DIFFICULT AT ALL
SUM OF ALL RESPONSES TO PHQ QUESTIONS 1-9: 5
SUM OF ALL RESPONSES TO PHQ QUESTIONS 1-9: 5

## 2018-04-16 ASSESSMENT — ANXIETY QUESTIONNAIRES
6. BECOMING EASILY ANNOYED OR IRRITABLE: NOT AT ALL
3. WORRYING TOO MUCH ABOUT DIFFERENT THINGS: SEVERAL DAYS
2. NOT BEING ABLE TO STOP OR CONTROL WORRYING: SEVERAL DAYS
GAD7 TOTAL SCORE: 5
GAD7 TOTAL SCORE: 5
4. TROUBLE RELAXING: SEVERAL DAYS
5. BEING SO RESTLESS THAT IT IS HARD TO SIT STILL: NOT AT ALL
GAD7 TOTAL SCORE: 5
7. FEELING AFRAID AS IF SOMETHING AWFUL MIGHT HAPPEN: SEVERAL DAYS
1. FEELING NERVOUS, ANXIOUS, OR ON EDGE: SEVERAL DAYS
7. FEELING AFRAID AS IF SOMETHING AWFUL MIGHT HAPPEN: SEVERAL DAYS

## 2018-04-16 NOTE — PROGRESS NOTES
SUBJECTIVE:   Martin Armstrong is a 62 year old male who presents to clinic today for the following health issues:    History of Present Illness     Diet:  Regular (no restrictions)  Frequency of exercise:  2-3 days/week  Duration of exercise:  30-45 minutes  Taking medications regularly:  Yes    Concern - Lump on neck  Onset: 1 week    Description:   Lump on right side of neck    Intensity: mild    Progression of Symptoms:  intermittent    Accompanying Signs & Symptoms:  No pain, lump has decreased in size since he felt it.    Previous history of similar problem:   no    Precipitating factors:   Worsened by: NA    Alleviating factors:  Improved by: NA    Therapies Tried and outcome: NA    Patient reports he felt a lump on the right side of his throat 5 days during his shower. He is not able to palpate the lump today, but states wife felt it as well. He states that lump was the size of a marble. He denies swelling or redness. He denies sinus symptoms or URI symptoms.    Depression Followup    Status since last visit: Stable     See PHQ-9 for current symptoms.  Other associated symptoms: None    Complicating factors:   Significant life event:  No   Current substance abuse:  None  Anxiety or Panic symptoms:  No    Answers for HPI/ROS submitted by the patient on 4/16/2018   PHQ-2 Score: 1  If you checked off any problems, how difficult have these problems made it for you to do your work, take care of things at home, or get along with other people?: Not difficult at all  PHQ9 TOTAL SCORE: 5  MINA 7 TOTAL SCORE: 5    PHQ-9 12/19/2017 3/16/2018 4/16/2018   Total Score 5 6 5   Q9: Suicide Ideation Several days Several days Not at all     In the past two weeks have you had thoughts of suicide or self-harm?  No.    Do you have concerns about your personal safety or the safety of others?   No  PHQ-9  English  PHQ-9   Any Language  Suicide Assessment Five-step Evaluation and Treatment (SAFE-T)  Problem list and histories  reviewed & adjusted, as indicated.  Additional history: as documented    Patient Active Problem List   Diagnosis     CARDIOVASCULAR SCREENING; LDL GOAL LESS THAN 160     Chemical dependency (H)     DJD (degenerative joint disease) of knee     Abnormal gait     Advanced directives, counseling/discussion     Diverticulosis     Hypothyroidism     Major depressive disorder     Osteoarthritis of left hand     Status post left partial knee replacement     Ganglion, finger of right hand     Past Surgical History:   Procedure Laterality Date     ARTHROPLASTY KNEE UNICOMPARTMENT  11/8/2011    Procedure:ARTHROPLASTY KNEE UNICOMPARTMENT; LEFT KNEE UNICOMPARTMENT ARTHROPLASTY (FREEMAN)^; Surgeon:SHABBIR RADER; Location:[facility] OR     ARTHROSCOPY KNEE RT/LT  1/2006    left     COLONOSCOPY  3/27/08    due in 2013       Social History   Substance Use Topics     Smoking status: Never Smoker     Smokeless tobacco: Never Used     Alcohol use No     Family History   Problem Relation Age of Onset     Family History Negative Father      CANCER Mother      Eye Disorder Paternal Grandmother      Asthma No family hx of      C.A.D. No family hx of      DIABETES No family hx of      Hypertension No family hx of      CEREBROVASCULAR DISEASE No family hx of      Breast Cancer No family hx of      Cancer - colorectal No family hx of      Prostate Cancer No family hx of          Current Outpatient Prescriptions   Medication Sig Dispense Refill     levothyroxine (SYNTHROID/LEVOTHROID) 100 MCG tablet Take 1 tablet (100 mcg) by mouth daily 90 tablet 0     citalopram (CELEXA) 40 MG tablet Take 1 tablet (40 mg) by mouth daily 90 tablet 2     buprenorphine-naloxone (SUBOXONE) 8-2 MG SUBL sublingual tablet        cyclobenzaprine (FLEXERIL) 10 MG tablet Take 0.5-1 tablets (5-10 mg) by mouth 3 times daily as needed for muscle spasms (Patient not taking: Reported on 3/16/2018) 30 tablet 0     omeprazole 20 MG tablet Take 1 tablet (20 mg) by mouth daily  Take 30-60 minutes before a meal. (Patient not taking: Reported on 3/16/2018) 90 tablet 1     ASTAXANTHIN PO        chlorhexidine (PERIDEX) 0.12 % solution   2     Multiple Vitamins-Minerals (MULTIVITAL PO)        No Known Allergies  Recent Labs   Lab Test  03/16/18   1121  12/19/17   1653   02/06/17   0855   11/21/14   1454   04/12/13   0805   LDL   --    --    --   76   --    --    --   125   HDL   --    --    --   43   --    --    --   47   TRIG   --    --    --   177*   --    --    --   78   CR   --    --    --    --    --   0.82   --   0.87   GFRESTIMATED   --    --    --    --    --   >90  Non  GFR Calc     --   >90   GFRESTBLACK   --    --    --    --    --   >90   GFR Calc     --   >90   POTASSIUM   --    --    --    --    --   3.7   --   3.8   TSH  0.37*  4.88*   < >  Canceled, Test credited   Test canceled by physician  CORRECTED ON 02/06 AT 1146: PREVIOUSLY REPORTED AS 15.48     < >  11.66*   < >  12.40*    < > = values in this interval not displayed.      BP Readings from Last 3 Encounters:   04/16/18 124/68   03/16/18 120/66   12/19/17 136/82    Wt Readings from Last 3 Encounters:   04/16/18 177 lb (80.3 kg)   03/16/18 180 lb 6.4 oz (81.8 kg)   12/27/17 183 lb (83 kg)         ROS:  Constitutional, HEENT, cardiovascular, pulmonary, GI, , musculoskeletal, neuro, skin, endocrine and psych systems are negative, except as otherwise noted.    This document serves as a record of the services and decisions personally performed and made by Nancy Sen CNP. It was created on her behalf by Jenae Toledo, a trained medical scribe. The creation of this document is based the provider's statements to the medical scribe.    Jenae Toledo April 16, 2018 11:33 AM    OBJECTIVE:   /68  Pulse 78  Temp 98.8  F (37.1  C) (Temporal)  Wt 177 lb (80.3 kg)  SpO2 96%  BMI 26.14 kg/m2  Body mass index is 26.14 kg/(m^2).  GENERAL: healthy, alert and no distress  NECK: Mildly enlarged lymph  node below the mandible. It is soft, non-tender, and mobile. No concerning clinical finding. No adenopathy, no asymmetry, masses, or scars and thyroid normal to palpation  RESP: lungs clear to auscultation - no rales, rhonchi or wheezes  CV: regular rate and rhythm, normal S1 S2, no S3 or S4, no murmur, click or rub, no peripheral edema and peripheral pulses strong  ABDOMEN: soft, nontender, no hepatosplenomegaly, no masses and bowel sounds normal. Muscle anomaly in the ventral area.  SKIN: no suspicious lesions or rashes  NEURO: Normal strength and tone, mentation intact and speech normal  PSYCH: mentation appears normal, affect normal/bright    Diagnostic Test Results:  none     ASSESSMENT/PLAN:       ICD-10-CM    1. Lymphadenopathy R59.1    2. Screening for colon cancer Z12.11 GASTROENTEROLOGY ADULT REF PROCEDURE ONLY [facility] [phone]; No Provider Preference     Reassurance provided. Unable to palpate lump on exam today, but I suspect possibly is an enlarged lymph node that is correctly funcitonal. Discussed watchful monitoring and RTC if worrisome signs such as swelling or growth in size while asymptomatic arise.   Avoid constant attention with palpation of the region.   Patient should return to clinic if he starts experiencing warning signs for further evaluation.    Colonoscopy due this summer. Referral sent.     Follow-up - Return to clinic with any new or worsening symptoms, and as needed.      The information in this document, created by the medical scribe for me, accurately reflects the services I personally performed and the decisions made by me. I have reviewed and approved this document for accuracy prior to leaving the patient care area.    BETH Cook [facility]

## 2018-04-16 NOTE — MR AVS SNAPSHOT
After Visit Summary   4/16/2018    Martin Armstrong    MRN: [id number]           Patient Information     Date Of Birth          1955        Visit Information        Provider Department      4/16/2018 11:20 AM Nancy Sen APRN CNP [facility]        Today's Diagnoses     Lymphadenopathy    -  1    Screening for colon cancer           Follow-ups after your visit        Additional Services     GASTROENTEROLOGY ADULT REF PROCEDURE ONLY [facility] [phone]; No Provider Preference       Last Lab Result: Creatinine (mg/dL)       Date                     Value                 11/21/2014               0.82             ----------  There is no height or weight on file to calculate BMI.     Needed:  No  Language:  English    Patient will be contacted to schedule procedure.     Please be aware that coverage of these services is subject to the terms and limitations of your health insurance plan.  Call member services at your health plan with any benefit or coverage questions.  Any procedures must be performed at a [facility] facility OR coordinated by your clinic's referral office.    Please bring the following with you to your appointment:    (1) Any X-Rays, CTs or MRIs which have been performed.  Contact the facility where they were done to arrange for  prior to your scheduled appointment.    (2) List of current medications   (3) This referral request   (4) Any documents/labs given to you for this referral                  Who to contact     If you have questions or need follow up information about today's clinic visit or your schedule please contact [facility] directly at [phone].  Normal or non-critical lab and imaging results will be communicated to you by MyChart, letter or phone within 4 business days after the clinic has received the results. If you do not hear from us within 7 days, please contact the clinic through MyChart or phone. If you  have a critical or abnormal lab result, we will notify you by phone as soon as possible.  Submit refill requests through [employer] or call your pharmacy and they will forward the refill request to us. Please allow 3 business days for your refill to be completed.          Additional Information About Your Visit        hipixhart Information     [employer] gives you secure access to your electronic health record. If you see a primary care provider, you can also send messages to your care team and make appointments. If you have questions, please call your primary care clinic.  If you do not have a primary care provider, please call [phone] and they will assist you.        Care EveryWhere ID     This is your Care EveryWhere ID. This could be used by other organizations to access your [facility] [place] [facility] medical records  [id number]        Your Vitals Were     Pulse Temperature Pulse Oximetry BMI (Body Mass Index)          78 98.8  F (37.1  C) (Temporal) 96% 26.14 kg/m2         Blood Pressure from Last 3 Encounters:   04/16/18 124/68   03/16/18 120/66   12/19/17 136/82    Weight from Last 3 Encounters:   04/16/18 177 lb (80.3 kg)   03/16/18 180 lb 6.4 oz (81.8 kg)   12/27/17 183 lb (83 kg)              We Performed the Following     GASTROENTEROLOGY ADULT REF PROCEDURE ONLY [facility] [phone]; No Provider Preference        Primary Care Provider Office Phone # Fax #    BETH Treadwell [facility] [phone] [address] [facility] [address]        Equal Access to Services     LUIZ HARO : Hadii aad ku hadasho Soomaali, waaxda luqadaha, qaybta kaalmada adeegyada, waxay luciain haywilder glavez. So [facility] [phone].    ATENCIÓN: Si habla español, tiene a ba disposición servicios gratuitos de asistencia lingüística. Llame al [phone].    We comply with applicable federal civil rights laws and [place] laws. We do not discriminate on the basis of race, color, national origin, age,  disability, sex, sexual orientation, or gender identity.            Thank you!     Thank you for choosing [facility]  for your care. Our goal is always to provide you with excellent care. Hearing back from our patients is one way we can continue to improve our services. Please take a few minutes to complete the written survey that you may receive in the mail after your visit with us. Thank you!             Your Updated Medication List - Protect others around you: Learn how to safely use, store and throw away your medicines at www.disposemymeds.org.          This list is accurate as of 4/16/18 11:59 PM.  Always use your most recent med list.                   Brand Name Dispense Instructions for use Diagnosis    ASTAXANTHIN PO           buprenorphine-naloxone 8-2 MG Subl sublingual tablet    SUBOXONE          chlorhexidine 0.12 % solution    PERIDEX          citalopram 40 MG tablet    celeXA    90 tablet    Take 1 tablet (40 mg) by mouth daily    Major depressive disorder, recurrent episode, moderate (H)       cyclobenzaprine 10 MG tablet    FLEXERIL    30 tablet    Take 0.5-1 tablets (5-10 mg) by mouth 3 times daily as needed for muscle spasms    Sacroiliac joint dysfunction, Left-sided low back pain without sciatica       levothyroxine 100 MCG tablet    SYNTHROID/LEVOTHROID    90 tablet    Take 1 tablet (100 mcg) by mouth daily    Hypothyroidism, unspecified type       MULTIVITAL PO           omeprazole 20 MG tablet     90 tablet    Take 1 tablet (20 mg) by mouth daily Take 30-60 minutes before a meal.    Epigastric pain

## 2018-04-17 ASSESSMENT — ANXIETY QUESTIONNAIRES: GAD7 TOTAL SCORE: 5

## 2018-04-17 ASSESSMENT — PATIENT HEALTH QUESTIONNAIRE - PHQ9: SUM OF ALL RESPONSES TO PHQ QUESTIONS 1-9: 5

## 2018-07-02 ENCOUNTER — HEALTH MAINTENANCE LETTER (OUTPATIENT)
Age: 63
End: 2018-07-02

## 2018-07-29 ENCOUNTER — TELEPHONE (OUTPATIENT)
Dept: FAMILY MEDICINE | Facility: CLINIC | Age: 63
End: 2018-07-29

## 2018-07-29 DIAGNOSIS — E03.9 HYPOTHYROIDISM, UNSPECIFIED TYPE: ICD-10-CM

## 2018-07-31 RX ORDER — LEVOTHYROXINE SODIUM 100 UG/1
TABLET ORAL
Qty: 30 TABLET | Refills: 0 | Status: SHIPPED | OUTPATIENT
Start: 2018-07-31 | End: 2018-09-04

## 2018-07-31 NOTE — TELEPHONE ENCOUNTER
"Requested Prescriptions   Pending Prescriptions Disp Refills     levothyroxine (SYNTHROID/LEVOTHROID) 100 MCG tablet [Pharmacy Med Name: LEVOTHYROXIN 100MCG TAB] 90 tablet 0     Sig: TAKE 1 TABLET BY MOUTH ONCE DAILY    Thyroid Protocol Failed    7/29/2018 11:15 AM       Failed - Normal TSH on file in past 12 months    Recent Labs   Lab Test  03/16/18   1121   TSH  0.37*           Passed - Patient is 12 years or older       Passed - Recent (12 mo) or future (30 days) visit within the authorizing provider's specialty    Patient had office visit in the last 12 months or has a visit in the next 30 days with authorizing provider or within the authorizing provider's specialty.  See \"Patient Info\" tab in inbasket, or \"Choose Columns\" in Meds & Orders section of the refill encounter.            levothyroxine (SYNTHROID/LEVOTHROID) 100 MCG tablet  Routing refill request to provider for review/approval because:  Labs out of range:  TSH  A break in medication, should have needed refills 06/2018    Please assist with scheduling.    Isela Hernandez, RN, BSN       "

## 2018-08-03 DIAGNOSIS — E03.9 HYPOTHYROIDISM, UNSPECIFIED TYPE: ICD-10-CM

## 2018-08-03 DIAGNOSIS — Z13.1 SCREENING FOR DIABETES MELLITUS: ICD-10-CM

## 2018-08-03 DIAGNOSIS — Z13.220 LIPID SCREENING: ICD-10-CM

## 2018-08-03 LAB
T4 FREE SERPL-MCNC: 0.88 NG/DL (ref 0.76–1.46)
TSH SERPL DL<=0.005 MIU/L-ACNC: 5.27 MU/L (ref 0.4–4)

## 2018-08-03 PROCEDURE — 84443 ASSAY THYROID STIM HORMONE: CPT | Performed by: NURSE PRACTITIONER

## 2018-08-03 PROCEDURE — 84439 ASSAY OF FREE THYROXINE: CPT | Performed by: NURSE PRACTITIONER

## 2018-08-03 PROCEDURE — 36415 COLL VENOUS BLD VENIPUNCTURE: CPT | Performed by: NURSE PRACTITIONER

## 2018-08-06 DIAGNOSIS — E03.9 HYPOTHYROIDISM, UNSPECIFIED TYPE: Primary | ICD-10-CM

## 2018-08-06 RX ORDER — LEVOTHYROXINE SODIUM 112 UG/1
112 TABLET ORAL EVERY OTHER DAY
Qty: 45 TABLET | Refills: 0 | Status: SHIPPED | OUTPATIENT
Start: 2018-08-06 | End: 2019-01-07

## 2018-08-06 RX ORDER — LEVOTHYROXINE SODIUM 100 UG/1
100 TABLET ORAL EVERY OTHER DAY
Qty: 45 TABLET | Refills: 0 | Status: SHIPPED | OUTPATIENT
Start: 2018-08-06 | End: 2018-10-08

## 2018-09-04 ENCOUNTER — OFFICE VISIT (OUTPATIENT)
Dept: URGENT CARE | Facility: RETAIL CLINIC | Age: 63
End: 2018-09-04
Payer: COMMERCIAL

## 2018-09-04 VITALS — DIASTOLIC BLOOD PRESSURE: 79 MMHG | SYSTOLIC BLOOD PRESSURE: 165 MMHG | TEMPERATURE: 98 F | HEART RATE: 80 BPM

## 2018-09-04 DIAGNOSIS — L23.7 CONTACT DERMATITIS DUE TO POISON IVY: Primary | ICD-10-CM

## 2018-09-04 PROCEDURE — 99203 OFFICE O/P NEW LOW 30 MIN: CPT | Performed by: PHYSICIAN ASSISTANT

## 2018-09-04 RX ORDER — TRIAMCINOLONE ACETONIDE 1 MG/G
CREAM TOPICAL
Qty: 80 G | Refills: 0 | Status: SHIPPED | OUTPATIENT
Start: 2018-09-04 | End: 2021-12-07

## 2018-09-04 RX ORDER — PREDNISONE 20 MG/1
TABLET ORAL
Qty: 20 TABLET | Refills: 0 | Status: SHIPPED | OUTPATIENT
Start: 2018-09-04 | End: 2018-09-18

## 2018-09-04 NOTE — PATIENT INSTRUCTIONS
Triamcinolone 0.1% cream for itching. Use as directed. Do not apply to face or genital area.  Prednisone 20 mg tabs, 12 day taper as directed.  Take an antihistamine such as Claritin (loratadine), Zyrtec (cetirizine) or Allegra (fexofenadine) daily for allergy symptoms.  Take benadryl at night to help reduce itching at night and aid in sleep.  Ibuprofen (advil) for inflammation    Wash area as soon as possible after contact with poision ivy to avoid spread and reduce reaction. Washing in 5-10 minutes can prevent reaction but even if you wash 2 hours after exposure, you can significantly reduce the reaction.  Rash is not spread by fluid in the blisters.  Wash sheets, clothes and animals exposed to get rid of toxins.  Cool compresses, ice packs, cooler showers for itching relief.  Baking soda paste, calamine lotion or aveeno oatmeal packs for itching.  Rub with affected are with ice cube.  Avoid sunlight and heat.  Avoid scratching to prevent secondary infection.  Watch for any signs of infection - (fever, bright red color, more pain, discharge - yellow/white/green)   Discussed other allergic reaction symptoms to watch for including difficulty breathing, throat swelling and/or shortness of breath.

## 2018-09-04 NOTE — PROGRESS NOTES
Chief Complaint   Patient presents with     Derm Problem     x 3 days, itchy rash all over body and spreading, no fevers     SUBJECTIVE:  Martin Armstrong is a 62 year old male who presents to the clinic today for a rash.  Onset of rash was 3 days ago.   Rash is gradual onset and worsening.   Location of the rash: neck, tosro and arms.  Quality/symptoms of rash: itching, discharge, red and blistering   Associated symptoms include: nothing. Denies fever, throat tightness, wheezing, cough, tongue/lip swelling and shortness of breath.  Symptoms are moderate and rash seems to be worsening.  Previous history of a similar rash? Yes: gets contact dermatitis from poison ivy  Treatment measures tried include:  Covering area with bandaids  Recent exposure history: poison ivy  Patient denies new meds, pets, foods, soaps, detergents, lotions, or enviornmental contacts.    Past Medical History:   Diagnosis Date     Chemical dependency (H)     remission- 2008, suboxone     Diverticulosis      DJD (degenerative joint disease) of knee      Current Outpatient Prescriptions   Medication Sig Dispense Refill     buprenorphine-naloxone (SUBOXONE) 8-2 MG SUBL sublingual tablet        citalopram (CELEXA) 40 MG tablet Take 1 tablet (40 mg) by mouth daily 90 tablet 2     levothyroxine (SYNTHROID/LEVOTHROID) 100 MCG tablet Take 1 tablet (100 mcg) by mouth every other day Alternating with 112mcg dose. 45 tablet 0     levothyroxine (SYNTHROID/LEVOTHROID) 112 MCG tablet Take 1 tablet (112 mcg) by mouth every other day Alternating with 100mcg dose. 45 tablet 0     Multiple Vitamins-Minerals (MULTIVITAL PO)        predniSONE (DELTASONE) 20 MG tablet Take 3 tabs (60 mg) by mouth daily x 3 days, 2 tabs (40 mg) daily x 3 days, 1 tab (20 mg) daily x 3 days, then 1/2 tab (10 mg) x 3 days. 20 tablet 0     triamcinolone (KENALOG) 0.1 % cream Apply sparingly to affected area three times daily as needed 80 g 0     [DISCONTINUED] levothyroxine  (SYNTHROID/LEVOTHROID) 100 MCG tablet TAKE 1 TABLET BY MOUTH ONCE DAILY (Patient not taking: Reported on 9/4/2018) 30 tablet 0     Social History   Substance Use Topics     Smoking status: Never Smoker     Smokeless tobacco: Never Used     Alcohol use No     No Known Allergies  REVIEW OF SYSTEMS  General: NEGATIVE for fever, chills, headaches.  Skin: POSITIVE for itching. NEGATIVE for pain.  Resp: NEGATIVE for cough, wheezing and SOB.    EXAM:   /79 (BP Location: Left arm)  Pulse 80  Temp 98  F (36.7  C) (Tympanic)  GENERAL APPEARANCE: healthy, alert and no distress  RESP: lungs clear to auscultation - no rales, rhonchi or wheezes  CV: regular rates and rhythm, normal S1 S2, no murmur noted  SKIN: Erythematous patches with clear fluid filled blisters, many ruptured and oozing clear yellow fluid, in a linear distribution on neck, shoulders, bilateral flank and axillary area, bilateral arms and forearms, left thigh.     ASSESSMENT:    ICD-10-CM    1. Contact dermatitis due to poison ivy L23.7 predniSONE (DELTASONE) 20 MG tablet     triamcinolone (KENALOG) 0.1 % cream     PLAN:  Patient Instructions   Triamcinolone 0.1% cream for itching. Use as directed. Do not apply to face or genital area.  Prednisone 20 mg tabs, 12 day taper as directed.  Take an antihistamine such as Claritin (loratadine), Zyrtec (cetirizine) or Allegra (fexofenadine) daily for allergy symptoms.  Take benadryl at night to help reduce itching at night and aid in sleep.  Ibuprofen (advil) for inflammation    Wash area as soon as possible after contact with poision ivy to avoid spread and reduce reaction. Washing in 5-10 minutes can prevent reaction but even if you wash 2 hours after exposure, you can significantly reduce the reaction.  Rash is not spread by fluid in the blisters.  Wash sheets, clothes and animals exposed to get rid of toxins.  Cool compresses, ice packs, cooler showers for itching relief.  Baking soda paste, calamine lotion  or aveeno oatmeal packs for itching.  Rub with affected are with ice cube.  Avoid sunlight and heat.  Avoid scratching to prevent secondary infection.  Watch for any signs of infection - (fever, bright red color, more pain, discharge - yellow/white/green)   Discussed other allergic reaction symptoms to watch for including difficulty breathing, throat swelling and/or shortness of breath.     Follow up with primary care provider with any problems, questions or concerns or if symptoms worsen or fail to improve. Patient agreed to plan and verbalized understanding.    Rosy Doherty PA-C  [facility]

## 2018-09-04 NOTE — MR AVS SNAPSHOT
After Visit Summary   9/4/2018    Martin Armstrong    MRN: [id number]           Patient Information     Date Of Birth          1955        Visit Information        Provider Department      9/4/2018 1:10 PM Diana Doherty PA-C [facility]        Today's Diagnoses     Contact dermatitis due to poison ivy    -  1      Care Instructions    Triamcinolone 0.1% cream for itching. Use as directed. Do not apply to face or genital area.  Prednisone 20 mg tabs, 12 day taper as directed.  Take an antihistamine such as Claritin (loratadine), Zyrtec (cetirizine) or Allegra (fexofenadine) daily for allergy symptoms.  Take benadryl at night to help reduce itching at night and aid in sleep.  Ibuprofen (advil) for inflammation    Wash area as soon as possible after contact with poision ivy to avoid spread and reduce reaction. Washing in 5-10 minutes can prevent reaction but even if you wash 2 hours after exposure, you can significantly reduce the reaction.  Rash is not spread by fluid in the blisters.  Wash sheets, clothes and animals exposed to get rid of toxins.  Cool compresses, ice packs, cooler showers for itching relief.  Baking soda paste, calamine lotion or aveeno oatmeal packs for itching.  Rub with affected are with ice cube.  Avoid sunlight and heat.  Avoid scratching to prevent secondary infection.  Watch for any signs of infection - (fever, bright red color, more pain, discharge - yellow/white/green)   Discussed other allergic reaction symptoms to watch for including difficulty breathing, throat swelling and/or shortness of breath.           Follow-ups after your visit        Who to contact     You can reach your care team any time of the day by calling [phone].  Notification of test results:  If you have an abnormal lab result, we will notify you by phone as soon as possible.         Additional Information About Your Visit        MyChart Information     MyChart gives you  secure access to your electronic health record. If you see a primary care provider, you can also send messages to your care team and make appointments. If you have questions, please call your primary care clinic.  If you do not have a primary care provider, please call [phone] and they will assist you.        Care EveryWhere ID     This is your Care EveryWhere ID. This could be used by other organizations to access your [facility] medical records  [id number]        Your Vitals Were     Pulse Temperature                80 98  F (36.7  C) (Tympanic)           Blood Pressure from Last 3 Encounters:   09/04/18 165/79   04/16/18 124/68   03/16/18 120/66    Weight from Last 3 Encounters:   04/16/18 177 lb (80.3 kg)   03/16/18 180 lb 6.4 oz (81.8 kg)   12/27/17 183 lb (83 kg)              Today, you had the following     No orders found for display         Today's Medication Changes          These changes are accurate as of 9/4/18  1:35 PM.  If you have any questions, ask your nurse or doctor.               Start taking these medicines.        Dose/Directions    predniSONE 20 MG tablet   Commonly known as:  DELTASONE   Used for:  Contact dermatitis due to poison ivy        Take 3 tabs (60 mg) by mouth daily x 3 days, 2 tabs (40 mg) daily x 3 days, 1 tab (20 mg) daily x 3 days, then 1/2 tab (10 mg) x 3 days.   Quantity:  20 tablet   Refills:  0       triamcinolone 0.1 % cream   Commonly known as:  KENALOG   Used for:  Contact dermatitis due to poison ivy        Apply sparingly to affected area three times daily as needed   Quantity:  80 g   Refills:  0         These medicines have changed or have updated prescriptions.        Dose/Directions    * levothyroxine 100 MCG tablet   Commonly known as:  SYNTHROID/LEVOTHROID   This may have changed:  Another medication with the same name was removed. Continue taking this medication, and follow the directions you see here.   Used for:  Hypothyroidism, unspecified type         Dose:  100 mcg   Take 1 tablet (100 mcg) by mouth every other day Alternating with 112mcg dose.   Quantity:  45 tablet   Refills:  0       * levothyroxine 112 MCG tablet   Commonly known as:  SYNTHROID/LEVOTHROID   This may have changed:  Another medication with the same name was removed. Continue taking this medication, and follow the directions you see here.   Used for:  Hypothyroidism, unspecified type        Dose:  112 mcg   Take 1 tablet (112 mcg) by mouth every other day Alternating with 100mcg dose.   Quantity:  45 tablet   Refills:  0       * Notice:  This list has 2 medication(s) that are the same as other medications prescribed for you. Read the directions carefully, and ask your doctor or other care provider to review them with you.      Stop taking these medicines if you haven't already. Please contact your care team if you have questions.     omeprazole 20 MG tablet                Where to get your medicines      These medications were sent to [id number] - [place], [place] - [address] [address]  [address] [facility] [address]     Phone:  [phone]     predniSONE 20 MG tablet    triamcinolone 0.1 % cream                Primary Care Provider Office Phone # Fax #    BETH Treadwell [facility] [phone] [address] [facility] [address]        Equal Access to Services     LUIZ HARO : Hadii milka ku hadasho Soomaali, waaxda luqadaha, qaybta kaalmada adeegyada, waxay juliano galvez. So [facility] [phone].    ATENCIÓN: Si habla español, tiene a ba disposición servicios gratuitos de asistencia lingüística. ame al [phone].    We comply with applicable federal civil rights laws and [place] laws. We do not discriminate on the basis of race, color, national origin, age, disability, sex, sexual orientation, or gender identity.            Thank you!     Thank you for choosing [facility]  for your care. Our goal is always to  provide you with excellent care. Hearing back from our patients is one way we can continue to improve our services. Please take a few minutes to complete the written survey that you may receive in the mail after your visit with us. Thank you!             Your Updated Medication List - Protect others around you: Learn how to safely use, store and throw away your medicines at www.disposemymeds.org.          This list is accurate as of 9/4/18  1:35 PM.  Always use your most recent med list.                   Brand Name Dispense Instructions for use Diagnosis    buprenorphine-naloxone 8-2 MG Subl sublingual tablet    SUBOXONE          citalopram 40 MG tablet    celeXA    90 tablet    Take 1 tablet (40 mg) by mouth daily    Major depressive disorder, recurrent episode, moderate (H)       * levothyroxine 100 MCG tablet    SYNTHROID/LEVOTHROID    45 tablet    Take 1 tablet (100 mcg) by mouth every other day Alternating with 112mcg dose.    Hypothyroidism, unspecified type       * levothyroxine 112 MCG tablet    SYNTHROID/LEVOTHROID    45 tablet    Take 1 tablet (112 mcg) by mouth every other day Alternating with 100mcg dose.    Hypothyroidism, unspecified type       MULTIVITAL PO           predniSONE 20 MG tablet    DELTASONE    20 tablet    Take 3 tabs (60 mg) by mouth daily x 3 days, 2 tabs (40 mg) daily x 3 days, 1 tab (20 mg) daily x 3 days, then 1/2 tab (10 mg) x 3 days.    Contact dermatitis due to poison ivy       triamcinolone 0.1 % cream    KENALOG    80 g    Apply sparingly to affected area three times daily as needed    Contact dermatitis due to poison ivy       * Notice:  This list has 2 medication(s) that are the same as other medications prescribed for you. Read the directions carefully, and ask your doctor or other care provider to review them with you.

## 2018-09-18 ENCOUNTER — OFFICE VISIT (OUTPATIENT)
Dept: FAMILY MEDICINE | Facility: OTHER | Age: 63
End: 2018-09-18
Payer: COMMERCIAL

## 2018-09-18 VITALS — RESPIRATION RATE: 16 BRPM | BODY MASS INDEX: 26.96 KG/M2 | TEMPERATURE: 97.9 F | WEIGHT: 182 LBS | HEIGHT: 69 IN

## 2018-09-18 DIAGNOSIS — L23.7 CONTACT DERMATITIS DUE TO POISON IVY: Primary | ICD-10-CM

## 2018-09-18 PROCEDURE — 99213 OFFICE O/P EST LOW 20 MIN: CPT | Performed by: NURSE PRACTITIONER

## 2018-09-18 RX ORDER — METHYLPREDNISOLONE 4 MG
TABLET, DOSE PACK ORAL
Qty: 21 TABLET | Refills: 0 | Status: SHIPPED | OUTPATIENT
Start: 2018-09-18 | End: 2018-11-05

## 2018-09-18 NOTE — PROGRESS NOTES
"  SUBJECTIVE:   Martin Armstrong is a 62 year old male who presents to clinic today for the following health issues:      HPI  Rash (Poison Ivy)  Onset: seen at [facility] 9/4/18, was given prednisone and it went away for the most part.  This started again on leg on 2-3 days ago and now his body is covered     Description:   Location: whole body   Character: round, red  Itching (Pruritis): YES    Progression of Symptoms:  worsening    Accompanying Signs & Symptoms:  Fever: no   Body aches or joint pain: no   Sore throat symptoms: no   Recent cold symptoms: no     History:   Previous similar rash: YES- this is a little worse than when he went to [facility]     Precipitating factors:   Exposure to similar rash: no   New exposures: no known new exposures   Recent travel: no     Alleviating factors:  Prednisone       Problem list and histories reviewed & adjusted, as indicated.  Additional history: as documented    BP Readings from Last 3 Encounters:   09/04/18 165/79   04/16/18 124/68   03/16/18 120/66    Wt Readings from Last 3 Encounters:   09/18/18 182 lb (82.6 kg)   04/16/18 177 lb (80.3 kg)   03/16/18 180 lb 6.4 oz (81.8 kg)                  Labs reviewed in EPIC    ROS:  Constitutional, HEENT, cardiovascular, pulmonary, gi and gu systems are negative, except as otherwise noted.    OBJECTIVE:     Temp 97.9  F (36.6  C) (Oral)  Resp 16  Ht 5' 9\" (1.753 m)  Wt 182 lb (82.6 kg)  BMI 26.88 kg/m2  Body mass index is 26.88 kg/(m^2).  GENERAL: healthy, alert and mild distress  RESP: lungs clear to auscultation - no rales, rhonchi or wheezes  CV: regular rate and rhythm, normal S1 S2, no S3 or S4, no murmur, click or rub, no peripheral edema and peripheral pulses strong  Examination of the rash reveals: Poison Ivy:covering arms, lower legs, neck. clusters of well-defined erythematous vesicles with clear drainage.  Areas of lichenification.  Negative for signs of infection.        ASSESSMENT/PLAN:             1. " Contact dermatitis due to poison ivy  Recommend extending dosing of oral steroid.  Home plan reviewed and handout given and reviewed with patient today.  Also reviewed symptoms of infection and cellulitis including sepsis and follow-up care for these.  - methylPREDNISolone (MEDROL DOSEPAK) 4 MG tablet; Follow package instructions  Dispense: 21 tablet; Refill: 0    Patient Instructions     Poison Joshua Rash  You have a rash and itching. This is a delayed reaction to the oils of the poison oak plant. You likely came in contact with it during the 3 days before your symptoms began. Your skin will become red and itchy. Small blisters may appear. These can break and leak a clear yellow fluid. This fluid is not contagious to others. The reaction usually starts to go away after 1 to 2 weeks. But it may take 4 to 6 weeks to fully clear.    Home care  Follow these guidelines when caring for yourself at home:    The plant oils still on your skin or clothes can be spread to other places on your body. They can also be passed on to other people and cause a similar reaction. That s why it s important to wash all of the plant oils off your skin and any clothes that may have been exposed. Wash all clothes that you were wearing. Use hot water with regular laundry detergent.    Don't use over-the-counter creams that have neomycin or bacitracin. These may make the rash worse.    Avoid anything that heats up your skin. This includes hot showers or baths, or direct sunlight. These can make itching worse.    Put a cold compress on areas that are leaking (weeping), or blistered areas. Do this for 30 minutes, 3 times a day. To make a compress, dip a wash cloth in a mixture of 1 pint of cold water and 1 packet of astringent or oatmeal bath powder (colloidal oatmeal, sold in pharmacies). Keep the solution in the refrigerator for future use.    If large areas of skin are involved, you may take a lukewarm bath. Add colloidal oatmeal to the water.  Or you can add 1 cup of cornstarch or baking soda to the water.    For a rash in a smaller area, use hydrocortisone cream for redness and irritation. But don t use this if another medicine was prescribed. For severe itching, put an ice pack on the area. To make an ice pack, put ice cubes in a plastic bag that seals at the top. Wrap the bag in a clean, thin towel or cloth. Never put ice or an ice pack directly on the skin. Over-the-counter lotions that have calamine may also be helpful.    You can also use an oral antihistamine medicine with diphenhydramine for itching, unless another medicine was prescribed. This medicine may make you sleepy. So use lower doses during the daytime and higher doses at bedtime. Don t use medicine that has diphenhydramine if you have glaucoma. Also don t use it if you are a man with trouble urinating because of an enlarged prostate. Antihistamines with loratidine cause less drowsiness. They are a good choice for daytime use.    For severe cases, your provider may prescribe oral steroids such as prednisone. Always take these exactly as prescribed.  Follow-up care  Follow up with your healthcare provider, or as advised. Call your provider if your rash gets worse or you are not starting to get better after 1 week of treatment.  When to seek medical advice  Call your healthcare provider right away if any of these occur:    Spreading facial rash with swelling of mouth or eyelids    Rash that spreads to the groin and causes swelling of the penis, scrotum, or vaginal area    Trouble urinating because of swelling in the genital area  Also call your provider if you have signs of infection in the areas of broken blisters:    Spreading redness    Pus or fluid draining from the blisters    Yellow-brown crusts form over the open blisters    Fever of 100.4 F (38 C) or higher, or as directed by your provider  Call 911  Call 911 if you have severe swelling in your face, eyelids, mouth, throat, or  tongue.  Date Last Reviewed: 8/1/2016 2000-2017 [employer]. [address] [place] [address], [place] [address]. All rights reserved. This information is not intended as a substitute for professional medical care. Always follow your healthcare professional's instructions.      May use benadryl 25 - 50 mg for antihistamine and to help with sleep and itching.     Thank you  BETH Zamarripa CNP [facility]

## 2018-09-18 NOTE — MR AVS SNAPSHOT
After Visit Summary   9/18/2018    Martin Armstrong    MRN: [id number]           Patient Information     Date Of Birth          1955        Visit Information        Provider Department      9/18/2018 1:40 PM Lazara Orosco APRN [facility]        Today's Diagnoses     Contact dermatitis due to poison ivy    -  1      Care Instructions      Poison [place] Rash  You have a rash and itching. This is a delayed reaction to the oils of the poison oak plant. You likely came in contact with it during the 3 days before your symptoms began. Your skin will become red and itchy. Small blisters may appear. These can break and leak a clear yellow fluid. This fluid is not contagious to others. The reaction usually starts to go away after 1 to 2 weeks. But it may take 4 to 6 weeks to fully clear.    Home care  Follow these guidelines when caring for yourself at home:    The plant oils still on your skin or clothes can be spread to other places on your body. They can also be passed on to other people and cause a similar reaction. That s why it s important to wash all of the plant oils off your skin and any clothes that may have been exposed. Wash all clothes that you were wearing. Use hot water with regular laundry detergent.    Don't use over-the-counter creams that have neomycin or bacitracin. These may make the rash worse.    Avoid anything that heats up your skin. This includes hot showers or baths, or direct sunlight. These can make itching worse.    Put a cold compress on areas that are leaking (weeping), or blistered areas. Do this for 30 minutes, 3 times a day. To make a compress, dip a wash cloth in a mixture of 1 pint of cold water and 1 packet of astringent or oatmeal bath powder (colloidal oatmeal, sold in pharmacies). Keep the solution in the refrigerator for future use.    If large areas of skin are involved, you may take a lukewarm bath. Add colloidal oatmeal to the water.  Or you can add 1 cup of cornstarch or baking soda to the water.    For a rash in a smaller area, use hydrocortisone cream for redness and irritation. But don t use this if another medicine was prescribed. For severe itching, put an ice pack on the area. To make an ice pack, put ice cubes in a plastic bag that seals at the top. Wrap the bag in a clean, thin towel or cloth. Never put ice or an ice pack directly on the skin. Over-the-counter lotions that have calamine may also be helpful.    You can also use an oral antihistamine medicine with diphenhydramine for itching, unless another medicine was prescribed. This medicine may make you sleepy. So use lower doses during the daytime and higher doses at bedtime. Don t use medicine that has diphenhydramine if you have glaucoma. Also don t use it if you are a man with trouble urinating because of an enlarged prostate. Antihistamines with loratidine cause less drowsiness. They are a good choice for daytime use.    For severe cases, your provider may prescribe oral steroids such as prednisone. Always take these exactly as prescribed.  Follow-up care  Follow up with your healthcare provider, or as advised. Call your provider if your rash gets worse or you are not starting to get better after 1 week of treatment.  When to seek medical advice  Call your healthcare provider right away if any of these occur:    Spreading facial rash with swelling of mouth or eyelids    Rash that spreads to the groin and causes swelling of the penis, scrotum, or vaginal area    Trouble urinating because of swelling in the genital area  Also call your provider if you have signs of infection in the areas of broken blisters:    Spreading redness    Pus or fluid draining from the blisters    Yellow-brown crusts form over the open blisters    Fever of 100.4 F (38 C) or higher, or as directed by your provider  Call 911  Call 911 if you have severe swelling in your face, eyelids, mouth, throat, or  "tongue.  Date Last Reviewed: 8/1/2016 2000-2017 [employer]. [address] [place] [address], [place] [address]. All rights reserved. This information is not intended as a substitute for professional medical care. Always follow your healthcare professional's instructions.      May use benadryl 25 - 50 mg for antihistamine and to help with sleep and itching.     Thank you  Lazara Orosco CNP            Follow-ups after your visit        Who to contact     If you have questions or need follow up information about today's clinic visit or your schedule please contact [facility] directly at [phone].  Normal or non-critical lab and imaging results will be communicated to you by "[employer]hart, letter or phone within 4 business days after the clinic has received the results. If you do not hear from us within 7 days, please contact the clinic through [employer] or phone. If you have a critical or abnormal lab result, we will notify you by phone as soon as possible.  Submit refill requests through [employer] or call your pharmacy and they will forward the refill request to us. Please allow 3 business days for your refill to be completed.          Additional Information About Your Visit        [employer] Information     [employer] gives you secure access to your electronic health record. If you see a primary care provider, you can also send messages to your care team and make appointments. If you have questions, please call your primary care clinic.  If you do not have a primary care provider, please call [phone] and they will assist you.        Care EveryWhere ID     This is your Care EveryWhere ID. This could be used by other organizations to access your [place] medical records  [id number]        Your Vitals Were     Temperature Respirations Height BMI (Body Mass Index)          97.9  F (36.6  C) (Oral) 16 5' 9\" (1.753 m) 26.88 kg/m2         Blood Pressure from Last 3 Encounters:   09/04/18 165/79 "   04/16/18 124/68   03/16/18 120/66    Weight from Last 3 Encounters:   09/18/18 182 lb (82.6 kg)   04/16/18 177 lb (80.3 kg)   03/16/18 180 lb 6.4 oz (81.8 kg)              Today, you had the following     No orders found for display         Today's Medication Changes          These changes are accurate as of 9/18/18  2:18 PM.  If you have any questions, ask your nurse or doctor.               Start taking these medicines.        Dose/Directions    methylPREDNISolone 4 MG tablet   Commonly known as:  MEDROL DOSEPAK   Used for:  Contact dermatitis due to poison ivy   Started by:  Lazara Orosco APRN CNP        Follow package instructions   Quantity:  21 tablet   Refills:  0            Where to get your medicines      These medications were sent to [facility] [place] [facility] - [address]   [address] [place] [address]     Phone:  [phone]     methylPREDNISolone 4 MG tablet                Primary Care Provider Office Phone # Fax #    BETH Treadwell [phone] [address] [facility] [address]        Equal Access to Services     [facility]: Hadii aad ku hadasho Soomaali, waaxda luqadaha, qaybta kaalmada adeegyada, sabrina myersin hayannn lizbeth galvez. So [facility] [phone].    ATENCIÓN: Si habla español, tiene a ba disposición servicios gratuitos de asistencia lingüística. Llame al [phone].    We comply with applicable federal civil rights laws and [place] laws. We do not discriminate on the basis of race, color, national origin, age, disability, sex, sexual orientation, or gender identity.            Thank you!     Thank you for choosing [facility]  for your care. Our goal is always to provide you with excellent care. Hearing back from our patients is one way we can continue to improve our services. Please take a few minutes to complete the written survey that you may receive in the mail after your visit  with us. Thank you!             Your Updated Medication List - Protect others around you: Learn how to safely use, store and throw away your medicines at www.disposemymeds.org.          This list is accurate as of 9/18/18  2:18 PM.  Always use your most recent med list.                   Brand Name Dispense Instructions for use Diagnosis    buprenorphine-naloxone 8-2 MG Subl sublingual tablet    SUBOXONE          citalopram 40 MG tablet    celeXA    90 tablet    Take 1 tablet (40 mg) by mouth daily    Major depressive disorder, recurrent episode, moderate (H)       * levothyroxine 100 MCG tablet    SYNTHROID/LEVOTHROID    45 tablet    Take 1 tablet (100 mcg) by mouth every other day Alternating with 112mcg dose.    Hypothyroidism, unspecified type       * levothyroxine 112 MCG tablet    SYNTHROID/LEVOTHROID    45 tablet    Take 1 tablet (112 mcg) by mouth every other day Alternating with 100mcg dose.    Hypothyroidism, unspecified type       methylPREDNISolone 4 MG tablet    MEDROL DOSEPAK    21 tablet    Follow package instructions    Contact dermatitis due to poison ivy       MULTIVITAL PO           predniSONE 20 MG tablet    DELTASONE    20 tablet    Take 3 tabs (60 mg) by mouth daily x 3 days, 2 tabs (40 mg) daily x 3 days, 1 tab (20 mg) daily x 3 days, then 1/2 tab (10 mg) x 3 days.    Contact dermatitis due to poison ivy       triamcinolone 0.1 % cream    KENALOG    80 g    Apply sparingly to affected area three times daily as needed    Contact dermatitis due to poison ivy       * Notice:  This list has 2 medication(s) that are the same as other medications prescribed for you. Read the directions carefully, and ask your doctor or other care provider to review them with you.

## 2018-09-18 NOTE — PATIENT INSTRUCTIONS
Poison [place] Rash  You have a rash and itching. This is a delayed reaction to the oils of the poison oak plant. You likely came in contact with it during the 3 days before your symptoms began. Your skin will become red and itchy. Small blisters may appear. These can break and leak a clear yellow fluid. This fluid is not contagious to others. The reaction usually starts to go away after 1 to 2 weeks. But it may take 4 to 6 weeks to fully clear.    Home care  Follow these guidelines when caring for yourself at home:    The plant oils still on your skin or clothes can be spread to other places on your body. They can also be passed on to other people and cause a similar reaction. That s why it s important to wash all of the plant oils off your skin and any clothes that may have been exposed. Wash all clothes that you were wearing. Use hot water with regular laundry detergent.    Don't use over-the-counter creams that have neomycin or bacitracin. These may make the rash worse.    Avoid anything that heats up your skin. This includes hot showers or baths, or direct sunlight. These can make itching worse.    Put a cold compress on areas that are leaking (weeping), or blistered areas. Do this for 30 minutes, 3 times a day. To make a compress, dip a wash cloth in a mixture of 1 pint of cold water and 1 packet of astringent or oatmeal bath powder (colloidal oatmeal, sold in pharmacies). Keep the solution in the refrigerator for future use.    If large areas of skin are involved, you may take a lukewarm bath. Add colloidal oatmeal to the water. Or you can add 1 cup of cornstarch or baking soda to the water.    For a rash in a smaller area, use hydrocortisone cream for redness and irritation. But don t use this if another medicine was prescribed. For severe itching, put an ice pack on the area. To make an ice pack, put ice cubes in a plastic bag that seals at the top. Wrap the bag in a clean, thin towel or cloth. Never put ice  or an ice pack directly on the skin. Over-the-counter lotions that have calamine may also be helpful.    You can also use an oral antihistamine medicine with diphenhydramine for itching, unless another medicine was prescribed. This medicine may make you sleepy. So use lower doses during the daytime and higher doses at bedtime. Don t use medicine that has diphenhydramine if you have glaucoma. Also don t use it if you are a man with trouble urinating because of an enlarged prostate. Antihistamines with loratidine cause less drowsiness. They are a good choice for daytime use.    For severe cases, your provider may prescribe oral steroids such as prednisone. Always take these exactly as prescribed.  Follow-up care  Follow up with your healthcare provider, or as advised. Call your provider if your rash gets worse or you are not starting to get better after 1 week of treatment.  When to seek medical advice  Call your healthcare provider right away if any of these occur:    Spreading facial rash with swelling of mouth or eyelids    Rash that spreads to the groin and causes swelling of the penis, scrotum, or vaginal area    Trouble urinating because of swelling in the genital area  Also call your provider if you have signs of infection in the areas of broken blisters:    Spreading redness    Pus or fluid draining from the blisters    Yellow-brown crusts form over the open blisters    Fever of 100.4 F (38 C) or higher, or as directed by your provider  Call 911  Call 911 if you have severe swelling in your face, eyelids, mouth, throat, or tongue.  Date Last Reviewed: 8/1/2016 2000-2017 [employer]. [address] [place] [address], [place] [address]. All rights reserved. This information is not intended as a substitute for professional medical care. Always follow your healthcare professional's instructions.      May use benadryl 25 - 50 mg for antihistamine and to help with sleep and itching.     Thank you  Lazara  Ana Luisa SALAZAR
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
## 2018-09-28 ENCOUNTER — OFFICE VISIT (OUTPATIENT)
Dept: FAMILY MEDICINE | Facility: CLINIC | Age: 63
End: 2018-09-28
Payer: COMMERCIAL

## 2018-09-28 ENCOUNTER — HOSPITAL ENCOUNTER (OUTPATIENT)
Facility: AMBULATORY SURGERY CENTER | Age: 63
End: 2018-09-28
Attending: INTERNAL MEDICINE
Payer: COMMERCIAL

## 2018-09-28 VITALS — TEMPERATURE: 97.8 F | RESPIRATION RATE: 16 BRPM | BODY MASS INDEX: 26.96 KG/M2 | HEIGHT: 69 IN | WEIGHT: 182 LBS

## 2018-09-28 DIAGNOSIS — L23.7 POISON IVY: Primary | ICD-10-CM

## 2018-09-28 DIAGNOSIS — Z23 NEED FOR PROPHYLACTIC VACCINATION AND INOCULATION AGAINST INFLUENZA: ICD-10-CM

## 2018-09-28 DIAGNOSIS — Z12.11 SCREENING FOR COLON CANCER: ICD-10-CM

## 2018-09-28 DIAGNOSIS — M25.552 HIP PAIN, LEFT: ICD-10-CM

## 2018-09-28 PROCEDURE — 90686 IIV4 VACC NO PRSV 0.5 ML IM: CPT | Performed by: NURSE PRACTITIONER

## 2018-09-28 PROCEDURE — 99214 OFFICE O/P EST MOD 30 MIN: CPT | Mod: 25 | Performed by: NURSE PRACTITIONER

## 2018-09-28 PROCEDURE — 90471 IMMUNIZATION ADMIN: CPT | Performed by: NURSE PRACTITIONER

## 2018-09-28 RX ORDER — TRIAMCINOLONE ACETONIDE 1 MG/G
CREAM TOPICAL
Qty: 60 G | Refills: 0 | Status: SHIPPED | OUTPATIENT
Start: 2018-09-28 | End: 2021-12-07

## 2018-09-28 RX ORDER — PREDNISONE 10 MG/1
TABLET ORAL
Qty: 33 TABLET | Refills: 0 | Status: SHIPPED | OUTPATIENT
Start: 2018-09-28 | End: 2018-11-05

## 2018-09-28 ASSESSMENT — ANXIETY QUESTIONNAIRES
3. WORRYING TOO MUCH ABOUT DIFFERENT THINGS: SEVERAL DAYS
2. NOT BEING ABLE TO STOP OR CONTROL WORRYING: SEVERAL DAYS
7. FEELING AFRAID AS IF SOMETHING AWFUL MIGHT HAPPEN: SEVERAL DAYS
GAD7 TOTAL SCORE: 6
5. BEING SO RESTLESS THAT IT IS HARD TO SIT STILL: NOT AT ALL
1. FEELING NERVOUS, ANXIOUS, OR ON EDGE: SEVERAL DAYS
7. FEELING AFRAID AS IF SOMETHING AWFUL MIGHT HAPPEN: SEVERAL DAYS
GAD7 TOTAL SCORE: 6
GAD7 TOTAL SCORE: 6
6. BECOMING EASILY ANNOYED OR IRRITABLE: SEVERAL DAYS
4. TROUBLE RELAXING: SEVERAL DAYS

## 2018-09-28 ASSESSMENT — PATIENT HEALTH QUESTIONNAIRE - PHQ9
SUM OF ALL RESPONSES TO PHQ QUESTIONS 1-9: 5
SUM OF ALL RESPONSES TO PHQ QUESTIONS 1-9: 5
10. IF YOU CHECKED OFF ANY PROBLEMS, HOW DIFFICULT HAVE THESE PROBLEMS MADE IT FOR YOU TO DO YOUR WORK, TAKE CARE OF THINGS AT HOME, OR GET ALONG WITH OTHER PEOPLE: SOMEWHAT DIFFICULT

## 2018-09-28 ASSESSMENT — PAIN SCALES - GENERAL: PAINLEVEL: NO PAIN (0)

## 2018-09-28 NOTE — PROGRESS NOTES

## 2018-09-29 ASSESSMENT — PATIENT HEALTH QUESTIONNAIRE - PHQ9: SUM OF ALL RESPONSES TO PHQ QUESTIONS 1-9: 5

## 2018-09-29 ASSESSMENT — ANXIETY QUESTIONNAIRES: GAD7 TOTAL SCORE: 6

## 2018-10-04 ENCOUNTER — RADIANT APPOINTMENT (OUTPATIENT)
Dept: GENERAL RADIOLOGY | Facility: CLINIC | Age: 63
End: 2018-10-04
Attending: ORTHOPAEDIC SURGERY
Payer: COMMERCIAL

## 2018-10-04 ENCOUNTER — OFFICE VISIT (OUTPATIENT)
Dept: ORTHOPEDICS | Facility: CLINIC | Age: 63
End: 2018-10-04
Payer: COMMERCIAL

## 2018-10-04 VITALS
BODY MASS INDEX: 26.66 KG/M2 | SYSTOLIC BLOOD PRESSURE: 146 MMHG | HEIGHT: 69 IN | DIASTOLIC BLOOD PRESSURE: 79 MMHG | RESPIRATION RATE: 13 BRPM | OXYGEN SATURATION: 97 % | HEART RATE: 83 BPM | WEIGHT: 180 LBS

## 2018-10-04 DIAGNOSIS — M48.061 SPINAL STENOSIS OF LUMBAR REGION WITHOUT NEUROGENIC CLAUDICATION: ICD-10-CM

## 2018-10-04 DIAGNOSIS — M25.552 HIP PAIN, LEFT: Primary | ICD-10-CM

## 2018-10-04 DIAGNOSIS — M25.552 HIP PAIN, LEFT: ICD-10-CM

## 2018-10-04 PROCEDURE — 99243 OFF/OP CNSLTJ NEW/EST LOW 30: CPT | Performed by: ORTHOPAEDIC SURGERY

## 2018-10-04 PROCEDURE — 73502 X-RAY EXAM HIP UNI 2-3 VIEWS: CPT | Mod: FY

## 2018-10-04 NOTE — MR AVS SNAPSHOT
After Visit Summary   10/4/2018    Martin Armstrong    MRN: [id number]           Patient Information     Date Of Birth          1955        Visit Information        Provider Department      10/4/2018 8:00 AM Abdulkadir Jiménez MD [facility]        Today's Diagnoses     Hip pain, left    -  1      Care Instructions    Left hip looks good.  I suspect pain is from a pinched nerve in the back.  Watch your back posture.  Aleve up to 4 tablets per day or ibuprofen up to 12 tablets per day.            Follow-ups after your visit        Your next 10 appointments already scheduled     Oct 29, 2018   Procedure with Antonio Pacheco MD   [facility] (--)    [address]  [facility] [address] [phone]              Who to contact     If you have questions or need follow up information about today's clinic visit or your schedule please contact [facility] directly at [phone].  Normal or non-critical lab and imaging results will be communicated to you by Movayahart, letter or phone within 4 business days after the clinic has received the results. If you do not hear from us within 7 days, please contact the clinic through Movayahart or phone. If you have a critical or abnormal lab result, we will notify you by phone as soon as possible.  Submit refill requests through [employer] or call your pharmacy and they will forward the refill request to us. Please allow 3 business days for your refill to be completed.          Additional Information About Your Visit        MyChart Information     [employer] gives you secure access to your electronic health record. If you see a primary care provider, you can also send messages to your care team and make appointments. If you have questions, please call your primary care clinic.  If you do not have a primary care provider, please call [phone] and they will assist you.        Care EveryWhere ID     This is your  "Care EveryWhere ID. This could be used by other organizations to access your [facility] medical records  [id number]        Your Vitals Were     Pulse Respirations Height Pulse Oximetry BMI (Body Mass Index)       83 13 1.753 m (5' 9\") 97% 26.58 kg/m2        Blood Pressure from Last 3 Encounters:   10/04/18 146/79   09/04/18 165/79   04/16/18 124/68    Weight from Last 3 Encounters:   10/04/18 81.6 kg (180 lb)   09/28/18 82.6 kg (182 lb)   09/18/18 82.6 kg (182 lb)               Primary Care Provider Office Phone # Fax #    Nancy BETH Mackenzie [facility] [phone] [address] [facility] [address]        Equal Access to Services     [facility] : Hadii milka carreon hadasho Soomaali, waaxda luqadaha, qaybta kaalmada adeegyada, sabrina nina . So [facility] [phone].    ATENCIÓN: Si habla español, tiene a ba disposición servicios gratuitos de asistencia lingüística. Aneudy al [phone].    We comply with applicable federal civil rights laws and [place] laws. We do not discriminate on the basis of race, color, national origin, age, disability, sex, sexual orientation, or gender identity.            Thank you!     Thank you for choosing [facility]  for your care. Our goal is always to provide you with excellent care. Hearing back from our patients is one way we can continue to improve our services. Please take a few minutes to complete the written survey that you may receive in the mail after your visit with us. Thank you!             Your Updated Medication List - Protect others around you: Learn how to safely use, store and throw away your medicines at www.disposemymeds.org.          This list is accurate as of 10/4/18  8:36 AM.  Always use your most recent med list.                   Brand Name Dispense Instructions for use Diagnosis    buprenorphine-naloxone 8-2 MG Subl sublingual tablet    SUBOXONE          citalopram 40 MG tablet    celeXA    90 tablet    Take " 1 tablet (40 mg) by mouth daily    Major depressive disorder, recurrent episode, moderate (H)       * levothyroxine 100 MCG tablet    SYNTHROID/LEVOTHROID    45 tablet    Take 1 tablet (100 mcg) by mouth every other day Alternating with 112mcg dose.    Hypothyroidism, unspecified type       * levothyroxine 112 MCG tablet    SYNTHROID/LEVOTHROID    45 tablet    Take 1 tablet (112 mcg) by mouth every other day Alternating with 100mcg dose.    Hypothyroidism, unspecified type       methylPREDNISolone 4 MG tablet    MEDROL DOSEPAK    21 tablet    Follow package instructions    Contact dermatitis due to poison ivy       MULTIVITAL PO           predniSONE 10 MG tablet    DELTASONE    33 tablet    40 mg po every day for 4 days, then 20 mg po every day for 5 days, then 10 mg po every day for 5 days, then 1/2 tab po every day for 4 days, then off.    Poison ivy       * triamcinolone 0.1 % cream    KENALOG    80 g    Apply sparingly to affected area three times daily as needed    Contact dermatitis due to poison ivy       * triamcinolone 0.1 % cream    KENALOG    60 g    Apply sparingly to affected area three times daily as needed    Poison ivy       * Notice:  This list has 4 medication(s) that are the same as other medications prescribed for you. Read the directions carefully, and ask your doctor or other care provider to review them with you.

## 2018-10-04 NOTE — PATIENT INSTRUCTIONS
Left hip looks good.  I suspect pain is from a pinched nerve in the back.  Watch your back posture.  Aleve up to 4 tablets per day or ibuprofen up to 12 tablets per day.

## 2018-10-04 NOTE — PROGRESS NOTES
Martin Armstrong is a 62 year old male who is seen in consultation at the request of Nancy Sen NP  for left anterior hip pain.    Past Medical History:   Diagnosis Date     Chemical dependency (H)     remission- 2008, suboxone     Diverticulosis      DJD (degenerative joint disease) of knee        Past Surgical History:   Procedure Laterality Date     ARTHROPLASTY KNEE UNICOMPARTMENT  11/8/2011    Procedure:ARTHROPLASTY KNEE UNICOMPARTMENT; LEFT KNEE UNICOMPARTMENT ARTHROPLASTY (FREEMAN)^; Surgeon:SHABBIR RADER; Location:[facility] OR     ARTHROSCOPY KNEE RT/LT  1/2006    left     COLONOSCOPY  3/27/08    due in 2013       Family History   Problem Relation Age of Onset     Family History Negative Father      Cancer Mother      Eye Disorder Paternal Grandmother      Asthma No family hx of      C.A.D. No family hx of      Diabetes No family hx of      Hypertension No family hx of      Cerebrovascular Disease No family hx of      Breast Cancer No family hx of      Cancer - colorectal No family hx of      Prostate Cancer No family hx of        Social History     Social History     Marital status:      Spouse name: N/A     Number of children: 6     Years of education: N/A     Occupational History      [employer]     Social History Main Topics     Smoking status: Never Smoker     Smokeless tobacco: Never Used     Alcohol use No     Drug use: No      Comment: h/o chem dep due to post -op analgesics 1-06     Sexual activity: Yes     Partners: Female      Comment: no protection     Other Topics Concern     Parent/Sibling W/ Cabg, Mi Or Angioplasty Before 65f 55m? No     Social History Narrative       Current Outpatient Prescriptions   Medication Sig Dispense Refill     buprenorphine-naloxone (SUBOXONE) 8-2 MG SUBL sublingual tablet        citalopram (CELEXA) 40 MG tablet Take 1 tablet (40 mg) by mouth daily 90 tablet 2     levothyroxine (SYNTHROID/LEVOTHROID) 100 MCG tablet Take 1 tablet (100 mcg) by mouth every  "other day Alternating with 112mcg dose. 45 tablet 0     levothyroxine (SYNTHROID/LEVOTHROID) 112 MCG tablet Take 1 tablet (112 mcg) by mouth every other day Alternating with 100mcg dose. 45 tablet 0     methylPREDNISolone (MEDROL DOSEPAK) 4 MG tablet Follow package instructions 21 tablet 0     Multiple Vitamins-Minerals (MULTIVITAL PO)        predniSONE (DELTASONE) 10 MG tablet 40 mg po every day for 4 days, then 20 mg po every day for 5 days, then 10 mg po every day for 5 days, then 1/2 tab po every day for 4 days, then off. 33 tablet 0     triamcinolone (KENALOG) 0.1 % cream Apply sparingly to affected area three times daily as needed 60 g 0     triamcinolone (KENALOG) 0.1 % cream Apply sparingly to affected area three times daily as needed 80 g 0       No Known Allergies    REVIEW OF SYSTEMS:  CONSTITUTIONAL:  NEGATIVE for fever, chills, change in weight, not feeling tired  SKIN:  NEGATIVE for worrisome rashes, no skin lumps, no skin ulcers and no non-healing wounds  EYES:  NEGATIVE for vision changes or irritation.  ENT/MOUTH:  NEGATIVE.  No hearing loss, no hoarseness, no difficulty swallowing.  RESP:  NEGATIVE. No cough or shortness of breath.  CV:  NEGATIVE for chest pain, palpitations or peripheral edema  GI:  NEGATIVE for nausea, abdominal pain, heartburn, or change in bowel habits  :  Negative. No dysuria, no hematuria  MUSCULOSKELETAL:  See HPI above  NEURO:  NEGATIVE . No headaches, no dizziness,  no numbness  ENDOCRINE:  NEGATIVE for temperature intolerance, skin/hair changes  HEME/ALLERGY/IMMUNE:  NEGATIVE for bleeding problems  PSYCHIATRIC:  NEGATIVE. no anxiety, no depression.     Exam:  Vitals: /79  Pulse 83  Resp 13  Ht 1.753 m (5' 9\")  Wt 81.6 kg (180 lb)  SpO2 97%  BMI 26.58 kg/m2  BMI= Body mass index is 26.58 kg/(m^2).  Constitutional:  healthy, alert and no distress  Neuro: Alert and Oriented x 3, Sensation grossly WNL.  Psych: Affect normal   Respiratory: Breathing not " labored.  Cardiovascular: normal peripheral pulses  Lymph: no adenopathy  Skin: No rashes,worrisome lesions or skin problems

## 2018-10-04 NOTE — LETTER
10/4/2018         RE: Martin Armstrong  [address] [facility] [address]        Dear Colleague,    Thank you for referring your patient, Martin Armstrong, to the [facility]. Please see a copy of my visit note below.    Martin Armstrong is a 62 year old male who is seen in consultation at the request of Nancy Sen NP  for left anterior hip pain.    Past Medical History:   Diagnosis Date     Chemical dependency (H)     remission- 2008, suboxone     Diverticulosis      DJD (degenerative joint disease) of knee        Past Surgical History:   Procedure Laterality Date     ARTHROPLASTY KNEE UNICOMPARTMENT  11/8/2011    Procedure:ARTHROPLASTY KNEE UNICOMPARTMENT; LEFT KNEE UNICOMPARTMENT ARTHROPLASTY (FREEMAN)^; Surgeon:SHABBIR RADER; Location:[facility]     ARTHROSCOPY KNEE RT/LT  1/2006    left     COLONOSCOPY  3/27/08    due in 2013       Family History   Problem Relation Age of Onset     Family History Negative Father      Cancer Mother      Eye Disorder Paternal Grandmother      Asthma No family hx of      C.A.D. No family hx of      Diabetes No family hx of      Hypertension No family hx of      Cerebrovascular Disease No family hx of      Breast Cancer No family hx of      Cancer - colorectal No family hx of      Prostate Cancer No family hx of        Social History     Social History     Marital status:      Spouse name: N/A     Number of children: 6     Years of education: N/A     Occupational History      [employer]     Social History Main Topics     Smoking status: Never Smoker     Smokeless tobacco: Never Used     Alcohol use No     Drug use: No      Comment: h/o chem dep due to post -op analgesics 1-06     Sexual activity: Yes     Partners: Female      Comment: no protection     Other Topics Concern     Parent/Sibling W/ Cabg, Mi Or Angioplasty Before 65f 55m? No     Social History Narrative       Current Outpatient Prescriptions   Medication Sig Dispense Refill      "buprenorphine-naloxone (SUBOXONE) 8-2 MG SUBL sublingual tablet        citalopram (CELEXA) 40 MG tablet Take 1 tablet (40 mg) by mouth daily 90 tablet 2     levothyroxine (SYNTHROID/LEVOTHROID) 100 MCG tablet Take 1 tablet (100 mcg) by mouth every other day Alternating with 112mcg dose. 45 tablet 0     levothyroxine (SYNTHROID/LEVOTHROID) 112 MCG tablet Take 1 tablet (112 mcg) by mouth every other day Alternating with 100mcg dose. 45 tablet 0     methylPREDNISolone (MEDROL DOSEPAK) 4 MG tablet Follow package instructions 21 tablet 0     Multiple Vitamins-Minerals (MULTIVITAL PO)        predniSONE (DELTASONE) 10 MG tablet 40 mg po every day for 4 days, then 20 mg po every day for 5 days, then 10 mg po every day for 5 days, then 1/2 tab po every day for 4 days, then off. 33 tablet 0     triamcinolone (KENALOG) 0.1 % cream Apply sparingly to affected area three times daily as needed 60 g 0     triamcinolone (KENALOG) 0.1 % cream Apply sparingly to affected area three times daily as needed 80 g 0       No Known Allergies    REVIEW OF SYSTEMS:  CONSTITUTIONAL:  NEGATIVE for fever, chills, change in weight, not feeling tired  SKIN:  NEGATIVE for worrisome rashes, no skin lumps, no skin ulcers and no non-healing wounds  EYES:  NEGATIVE for vision changes or irritation.  ENT/MOUTH:  NEGATIVE.  No hearing loss, no hoarseness, no difficulty swallowing.  RESP:  NEGATIVE. No cough or shortness of breath.  CV:  NEGATIVE for chest pain, palpitations or peripheral edema  GI:  NEGATIVE for nausea, abdominal pain, heartburn, or change in bowel habits  :  Negative. No dysuria, no hematuria  MUSCULOSKELETAL:  See HPI above  NEURO:  NEGATIVE . No headaches, no dizziness,  no numbness  ENDOCRINE:  NEGATIVE for temperature intolerance, skin/hair changes  HEME/ALLERGY/IMMUNE:  NEGATIVE for bleeding problems  PSYCHIATRIC:  NEGATIVE. no anxiety, no depression.     Exam:  Vitals: /79  Pulse 83  Resp 13  Ht 1.753 m (5' 9\")  Wt " 81.6 kg (180 lb)  SpO2 97%  BMI 26.58 kg/m2  BMI= Body mass index is 26.58 kg/(m^2).  Constitutional:  healthy, alert and no distress  Neuro: Alert and Oriented x 3, Sensation grossly WNL.  Psych: Affect normal   Respiratory: Breathing not labored.  Cardiovascular: normal peripheral pulses  Lymph: no adenopathy  Skin: No rashes,worrisome lesions or skin problems      Again, thank you for allowing me to participate in the care of your patient.        Sincerely,        Abdulkadir Jiménez MD

## 2018-10-04 NOTE — PROGRESS NOTES
Visit Date:   10/04/2018      HISTORY OF PRESENT ILLNESS:  Mr. Armstrong is a 63-year-old male seen in consultation at the request of BETH Cook, CNP, for evaluation of left anterior hip pain.  He has had this on and off in the past.  It got very severe on 09/25, but now is feeling somewhat better again.  He works as a  and is in awkward positions a fair amount.  He does recall being bent over a fair amount the day before his pain started.  When it did start, it lasted for a few days and then gradually improved.  He now has occasional aching pain, rated 4/10, occasional sharp pains.  It is worse with lifting or carrying weight while walking.  He had similar problems about two years ago in the hip and has occasional pains in the back.  He was told as a younger adult that he had degenerative disk disease, with bulging disks at several areas.  He has never required surgery for that.  He indicates the pain is directly anterior hip joint, deep, right in the groin.      IMAGING:  X-ray of the pelvis was performed today, showing good joint space preservation of both hips.  He has no spurring, no sclerosis, no narrowing, and no fractures.  There is no calcification over the greater trochanters.  There is an old right femur fracture, with a plate in place.  We did not see the lumbar spine.      PHYSICAL EXAMINATION:  Examination shows tenderness at the left sacroiliac joint, negative on the right.  He had no significant tenderness in the sciatic notch on either side.  No tenderness over the ischial tuberosity or greater trochanter.  He had no pain with hip rotation on either side and has 60 to 70 degrees external rotation and 30 to 40 degrees internal rotation without pain.  He has no increased pain with hip abduction.  He has no pain with resisted hip flexion, extension, abduction, or adduction.  He has tight hamstrings with straight leg raising, but equal tightness on both sides.  Sensation and circulation  are intact.      IMPRESSION:  Left hip pain is most likely from spine origin.  I see no problems with the hip itself, either in the bones or the muscles surrounding the hip.  The only thing he has on physical exam right now is tenderness of the sacroiliac joint and I feel most likely his pain is from a pinched nerve that comes and goes.  We will have him watch his posture and use anti-inflammatories as needed.  If pain persists, we may consider MRI scan of his lumbar spine.            KATLYN PIÑA MD             D: 10/04/2018   T: 10/04/2018   MT: VERITO      Name:     TALI GALVEZ   MRN:      [id number]        Account:      [id number]   :      1955           Visit Date:   10/04/2018      Document: [id number]

## 2018-10-08 DIAGNOSIS — E03.9 HYPOTHYROIDISM, UNSPECIFIED TYPE: ICD-10-CM

## 2018-10-09 RX ORDER — LEVOTHYROXINE SODIUM 100 UG/1
TABLET ORAL
Qty: 45 TABLET | Refills: 0 | OUTPATIENT
Start: 2018-10-09

## 2018-10-09 RX ORDER — LEVOTHYROXINE SODIUM 100 UG/1
TABLET ORAL
Qty: 45 TABLET | Refills: 0 | Status: SHIPPED | OUTPATIENT
Start: 2018-10-09 | End: 2018-11-26

## 2018-10-09 NOTE — TELEPHONE ENCOUNTER
Pending Prescriptions:                       Disp   Refills    levothyroxine (SYNTHROID/LEVOTHROID) 100 *45 tab*0            Sig: TAKE 1 TABLET BY MOUTH EVERY OTHER DAY           ALTERNATING  WITH  THE  112MCG  DOSE    TSH   Date Value Ref Range Status   08/03/2018 5.27 (H) 0.40 - 4.00 mU/L Final     RN unable to fill with recent elevated TSH level, will route to PCP    Robert Crockett RN, BSN

## 2018-11-05 ENCOUNTER — SURGERY (OUTPATIENT)
Age: 63
End: 2018-11-05

## 2018-11-05 ENCOUNTER — HOSPITAL ENCOUNTER (OUTPATIENT)
Facility: AMBULATORY SURGERY CENTER | Age: 63
Discharge: HOME | End: 2018-11-05
Attending: SPECIALIST | Admitting: SPECIALIST
Payer: COMMERCIAL

## 2018-11-05 VITALS
RESPIRATION RATE: 18 BRPM | SYSTOLIC BLOOD PRESSURE: 140 MMHG | DIASTOLIC BLOOD PRESSURE: 88 MMHG | OXYGEN SATURATION: 96 %

## 2018-11-05 LAB — COLONOSCOPY: NORMAL

## 2018-11-05 PROCEDURE — G8918 PT W/O PREOP ORDER IV AB PRO: HCPCS

## 2018-11-05 PROCEDURE — 45378 DIAGNOSTIC COLONOSCOPY: CPT

## 2018-11-05 PROCEDURE — G8907 PT DOC NO EVENTS ON DISCHARG: HCPCS

## 2018-11-05 RX ORDER — ONDANSETRON 2 MG/ML
4 INJECTION INTRAMUSCULAR; INTRAVENOUS
Status: DISCONTINUED | OUTPATIENT
Start: 2018-11-05 | End: 2018-11-06 | Stop reason: HOSPADM

## 2018-11-05 RX ORDER — LIDOCAINE 40 MG/G
CREAM TOPICAL
Status: DISCONTINUED | OUTPATIENT
Start: 2018-11-05 | End: 2018-11-06 | Stop reason: HOSPADM

## 2018-11-05 NOTE — BRIEF OP NOTE
[facility] Brief Operative Note    Pre-operative diagnosis: screening colonoscopy, GAIL lerner, BMI 26.88, [facility] fax: [phone], scheduled through Herrera MORTON   Post-operative diagnosis Diverticulosis     Procedure: Procedure(s):  screening colonoscopy   Surgeon(s): Surgeon(s) and Role:     * Adrian Hawthorne MD - Primary   Estimated blood loss: * No values recorded between 11/5/2018  9:15 AM and 11/5/2018  9:56 AM *    Specimens: * No specimens in log *   Findings: Please see ProVation procedure note in Chart Review

## 2018-11-05 NOTE — H&P
Pre-Endoscopy History and Physical     Martin Armstrong MRN# [id number]   YOB: 1955 Age: 62 year old     Date of Procedure: 11/5/2018  Primary care provider: Nancy Sen  Type of Endoscopy: Colonoscopy with possible biopsy, possible polypectomy  Reason for Procedure: screening  Type of Anesthesia Anticipated: Conscious Sedation    HPI:    Martin is a 62 year old male who will be undergoing the above procedure.      A history and physical has been performed. The patient's medications and allergies have been reviewed. The risks and benefits of the procedure and the sedation options and risks were discussed with the patient.  All questions were answered and informed consent was obtained.      He denies a personal or family history of anesthesia complications or bleeding disorders.     Patient Active Problem List   Diagnosis     CARDIOVASCULAR SCREENING; LDL GOAL LESS THAN 160     Chemical dependency (H)     DJD (degenerative joint disease) of knee     Abnormal gait     Advanced directives, counseling/discussion     Diverticulosis     Hypothyroidism     Major depressive disorder     Osteoarthritis of left hand     Status post left partial knee replacement     Ganglion, finger of right hand     Spinal stenosis of lumbar region without neurogenic claudication        Past Medical History:   Diagnosis Date     Chemical dependency (H)     remission- 2008, suboxone     Diverticulosis      DJD (degenerative joint disease) of knee      Thyroid disease         Past Surgical History:   Procedure Laterality Date     ARTHROPLASTY KNEE UNICOMPARTMENT  11/8/2011    Procedure:ARTHROPLASTY KNEE UNICOMPARTMENT; LEFT KNEE UNICOMPARTMENT ARTHROPLASTY (FREEMAN)^; Surgeon:SHABBIR RADER; Location:[facility] OR     ARTHROSCOPY KNEE RT/LT  1/2006    left     COLONOSCOPY  3/27/08    due in 2013       Social History   Substance Use Topics     Smoking status: Never Smoker     Smokeless tobacco: Never Used     Alcohol use No  "      Family History   Problem Relation Age of Onset     Family History Negative Father      Cancer Mother      Eye Disorder Paternal Grandmother      Asthma No family hx of      C.A.D. No family hx of      Diabetes No family hx of      Hypertension No family hx of      Cerebrovascular Disease No family hx of      Breast Cancer No family hx of      Cancer - colorectal No family hx of      Prostate Cancer No family hx of        Prior to Admission medications    Medication Sig Start Date End Date Taking? Authorizing Provider   buprenorphine-naloxone (SUBOXONE) 8-2 MG SUBL sublingual tablet  12/20/16  Yes Reported, Patient   citalopram (CELEXA) 40 MG tablet Take 1 tablet (40 mg) by mouth daily 3/16/18  Yes Nancy Sen APRN CNP   levothyroxine (SYNTHROID/LEVOTHROID) 100 MCG tablet TAKE 1 TABLET BY MOUTH EVERY OTHER DAY ALTERNATING  WITH  THE  112MCG  DOSE 10/9/18  Yes Nancy Sen APRN CNP   levothyroxine (SYNTHROID/LEVOTHROID) 112 MCG tablet Take 1 tablet (112 mcg) by mouth every other day Alternating with 100mcg dose. 8/6/18   Bren Gonzalez PA-C   Multiple Vitamins-Minerals (MULTIVITAL PO)     Reported, Patient   triamcinolone (KENALOG) 0.1 % cream Apply sparingly to affected area three times daily as needed 9/28/18   Nancy Sen APRN CNP   triamcinolone (KENALOG) 0.1 % cream Apply sparingly to affected area three times daily as needed 9/4/18   Diana Doherty PA-C       No Known Allergies     REVIEW OF SYSTEMS:   5 point ROS negative except as noted above in HPI, including Gen., Resp., CV, GI &  system review.    PHYSICAL EXAM:   BP (!) 159/92  Resp 20  SpO2 98% Estimated body mass index is 26.58 kg/(m^2) as calculated from the following:    Height as of 10/4/18: 1.753 m (5' 9\").    Weight as of 10/4/18: 81.6 kg (180 lb).   GENERAL APPEARANCE: alert, and oriented  MENTAL STATUS: alert  AIRWAY EXAM: Mallampatti Class II (visualization of the soft palate, fauces, and uvula)  RESP: lungs clear to " auscultation - no rales, rhonchi or wheezes  CV: regular rates and rhythm  DIAGNOSTICS:    Not indicated    IMPRESSION   ASA Class 2 - Mild systemic disease    PLAN:   Plan for Colonoscopy with possible biopsy, possible polypectomy. We discussed the risks, benefits and alternatives and the patient wished to proceed.    The above has been forwarded to the consulting provider.      Signed Electronically by: Adrian Hawthorne  November 5, 2018

## 2018-11-26 ENCOUNTER — TELEPHONE (OUTPATIENT)
Dept: FAMILY MEDICINE | Facility: CLINIC | Age: 63
End: 2018-11-26

## 2018-11-26 DIAGNOSIS — E03.9 HYPOTHYROIDISM, UNSPECIFIED TYPE: ICD-10-CM

## 2018-11-26 RX ORDER — LEVOTHYROXINE SODIUM 100 UG/1
TABLET ORAL
Qty: 15 TABLET | Refills: 0 | Status: SHIPPED | OUTPATIENT
Start: 2018-11-26 | End: 2019-01-07 | Stop reason: DRUGHIGH

## 2018-11-27 NOTE — TELEPHONE ENCOUNTER
Levothyroxine 100mcg    Medication is being filled for 1 time refill only due to:  Future labs ordered Nonfasting.     Please help schedule lab only visit    Shana Chambers, RN, BSN

## 2018-11-30 NOTE — TELEPHONE ENCOUNTER
Nancy:    Looks like patient's TSH was drawn 08/03/2018 and was 5.27 and looks like his Synthroid was change to alternating 100mcg/112mcg dosing.     Is this alternating dose what he should be on? Or just straight 112mcg a day dosing?    Luther Freeman, RN, BSN

## 2018-11-30 NOTE — TELEPHONE ENCOUNTER
"Received a fax from the pharmacy.  Message: \"please verify with the pt on what the dose is for levothyroxine 100 mcg.  Pt is taking 1QD and Rx says 1QOD alternating with 112 mcg.  He is confused and not happy.\"    Flagging for RN to call and advise pt on medication plan.  "

## 2018-12-03 ENCOUNTER — TELEPHONE (OUTPATIENT)
Dept: FAMILY MEDICINE | Facility: CLINIC | Age: 63
End: 2018-12-03

## 2018-12-03 DIAGNOSIS — Z13.1 SCREENING FOR DIABETES MELLITUS: ICD-10-CM

## 2018-12-03 DIAGNOSIS — E03.9 HYPOTHYROIDISM, UNSPECIFIED TYPE: ICD-10-CM

## 2018-12-03 DIAGNOSIS — Z13.220 LIPID SCREENING: ICD-10-CM

## 2018-12-03 LAB
CHOLEST SERPL-MCNC: 151 MG/DL
GLUCOSE SERPL-MCNC: 126 MG/DL (ref 70–99)
HDLC SERPL-MCNC: 49 MG/DL
LDLC SERPL CALC-MCNC: 77 MG/DL
NONHDLC SERPL-MCNC: 102 MG/DL
T4 FREE SERPL-MCNC: 0.88 NG/DL (ref 0.76–1.46)
TRIGL SERPL-MCNC: 126 MG/DL
TSH SERPL DL<=0.005 MIU/L-ACNC: 5.95 MU/L (ref 0.4–4)

## 2018-12-03 PROCEDURE — 80061 LIPID PANEL: CPT | Performed by: NURSE PRACTITIONER

## 2018-12-03 PROCEDURE — 84443 ASSAY THYROID STIM HORMONE: CPT | Performed by: NURSE PRACTITIONER

## 2018-12-03 PROCEDURE — 82947 ASSAY GLUCOSE BLOOD QUANT: CPT | Performed by: NURSE PRACTITIONER

## 2018-12-03 PROCEDURE — 36415 COLL VENOUS BLD VENIPUNCTURE: CPT | Performed by: NURSE PRACTITIONER

## 2018-12-03 PROCEDURE — 84439 ASSAY OF FREE THYROXINE: CPT | Performed by: NURSE PRACTITIONER

## 2018-12-03 RX ORDER — LEVOTHYROXINE SODIUM 112 UG/1
112 TABLET ORAL DAILY
Qty: 30 TABLET | Refills: 1 | Status: SHIPPED | OUTPATIENT
Start: 2018-12-03 | End: 2019-01-07

## 2018-12-03 NOTE — TELEPHONE ENCOUNTER
Reason for Call:  Other call back    Detailed comments: Patient is confused regarding his medication. Patient asked that someone gives him call around 12:30PM today.     Phone Number Patient can be reached at: Home number on file [phone] (home)    Best Time: After 12:30pm    Can we leave a detailed message on this number? YES    Call taken on 12/3/2018 at 8:43 AM by Kelly Crawford

## 2018-12-03 NOTE — TELEPHONE ENCOUNTER
Patient has had a TSH drawn today.  Will follow up on the results and reorder synthroid based on current levels.  Bolivar QUESADA

## 2018-12-03 NOTE — TELEPHONE ENCOUNTER
Huddled with GARRETT. Will advise patient to take 110mcg pending his TSH labs drawn today.    Luther Freeman RN, BSN

## 2018-12-03 NOTE — TELEPHONE ENCOUNTER
Called patient back. He called past Friday and Nancy attempted to call him and had to leave a . He is out of his Synthroid and pharmacy is not filling Synthroid due to confusion of dosing.    He reports around August he received his stated 45 day supply with two different shaped pills he assumed were the different doses of Synthroid that was on the drug signature but it does not sound as if he received a full 90 day supply. He thought he was alternatively taking 112 mcg / 110 mcg.     He reports he is out of all medications at this time and is needing a refill as pharmacy never filled 15 day supply of Synthroid send to pharmacy on 11/16/2018. Had TSH and Free T4 labs drawn today and they are pending.    Router to provider for suggestions.    Luther Freeman, RN, BSN

## 2018-12-06 PROBLEM — R73.01 ELEVATED FASTING GLUCOSE: Status: ACTIVE | Noted: 2018-12-06

## 2018-12-07 ENCOUNTER — TELEPHONE (OUTPATIENT)
Dept: FAMILY MEDICINE | Facility: CLINIC | Age: 63
End: 2018-12-07

## 2018-12-07 NOTE — TELEPHONE ENCOUNTER
Left message yesterday regarding glucose level- 126- fasting. DM risk, advise exercise weight mgmt. Re-check in 1-6 months. Lipids normal. Nancy Sen

## 2019-01-03 DIAGNOSIS — F33.1 MAJOR DEPRESSIVE DISORDER, RECURRENT EPISODE, MODERATE (H): ICD-10-CM

## 2019-01-04 RX ORDER — CITALOPRAM HYDROBROMIDE 40 MG/1
TABLET ORAL
Qty: 90 TABLET | Refills: 0 | Status: SHIPPED | OUTPATIENT
Start: 2019-01-04 | End: 2019-01-07

## 2019-01-04 NOTE — TELEPHONE ENCOUNTER
Citalopram    PHQ-9 SCORE 3/16/2018 4/16/2018 9/28/2018   PHQ-9 Total Score - - -   PHQ-9 Total Score MyChart 6 (Mild depression) 5 (Mild depression) 5 (Mild depression)   PHQ-9 Total Score 6 5 5     Prescription approved per [facility] Refill Protocol.    Shana Chambers, RN, BSN

## 2019-01-07 ENCOUNTER — OFFICE VISIT (OUTPATIENT)
Dept: FAMILY MEDICINE | Facility: CLINIC | Age: 64
End: 2019-01-07

## 2019-01-07 VITALS
HEIGHT: 68 IN | HEART RATE: 88 BPM | TEMPERATURE: 98.2 F | OXYGEN SATURATION: 97 % | SYSTOLIC BLOOD PRESSURE: 132 MMHG | DIASTOLIC BLOOD PRESSURE: 76 MMHG | RESPIRATION RATE: 16 BRPM | WEIGHT: 175 LBS | BODY MASS INDEX: 26.52 KG/M2

## 2019-01-07 DIAGNOSIS — F33.1 MAJOR DEPRESSIVE DISORDER, RECURRENT EPISODE, MODERATE (H): ICD-10-CM

## 2019-01-07 DIAGNOSIS — E03.9 HYPOTHYROIDISM, UNSPECIFIED TYPE: ICD-10-CM

## 2019-01-07 LAB — TSH SERPL DL<=0.005 MIU/L-ACNC: 1.58 MU/L (ref 0.4–4)

## 2019-01-07 PROCEDURE — 84443 ASSAY THYROID STIM HORMONE: CPT | Performed by: NURSE PRACTITIONER

## 2019-01-07 PROCEDURE — 36415 COLL VENOUS BLD VENIPUNCTURE: CPT | Performed by: NURSE PRACTITIONER

## 2019-01-07 PROCEDURE — 99214 OFFICE O/P EST MOD 30 MIN: CPT | Performed by: NURSE PRACTITIONER

## 2019-01-07 RX ORDER — LEVOTHYROXINE SODIUM 112 UG/1
112 TABLET ORAL EVERY OTHER DAY
Qty: 90 TABLET | Refills: 3 | Status: SHIPPED | OUTPATIENT
Start: 2019-01-07 | End: 2019-01-08

## 2019-01-07 RX ORDER — CITALOPRAM HYDROBROMIDE 40 MG/1
TABLET ORAL
Qty: 90 TABLET | Refills: 2 | OUTPATIENT
Start: 2019-01-07

## 2019-01-07 RX ORDER — CITALOPRAM HYDROBROMIDE 40 MG/1
40 TABLET ORAL DAILY
Qty: 90 TABLET | Refills: 3 | Status: SHIPPED | OUTPATIENT
Start: 2019-01-07 | End: 2020-02-10 | Stop reason: ALTCHOICE

## 2019-01-07 ASSESSMENT — ANXIETY QUESTIONNAIRES
5. BEING SO RESTLESS THAT IT IS HARD TO SIT STILL: NOT AT ALL
3. WORRYING TOO MUCH ABOUT DIFFERENT THINGS: NOT AT ALL
6. BECOMING EASILY ANNOYED OR IRRITABLE: SEVERAL DAYS
IF YOU CHECKED OFF ANY PROBLEMS ON THIS QUESTIONNAIRE, HOW DIFFICULT HAVE THESE PROBLEMS MADE IT FOR YOU TO DO YOUR WORK, TAKE CARE OF THINGS AT HOME, OR GET ALONG WITH OTHER PEOPLE: SOMEWHAT DIFFICULT
1. FEELING NERVOUS, ANXIOUS, OR ON EDGE: SEVERAL DAYS
7. FEELING AFRAID AS IF SOMETHING AWFUL MIGHT HAPPEN: NOT AT ALL

## 2019-01-07 ASSESSMENT — MIFFLIN-ST. JEOR: SCORE: 1563.8

## 2019-01-07 ASSESSMENT — PATIENT HEALTH QUESTIONNAIRE - PHQ9
5. POOR APPETITE OR OVEREATING: NOT AT ALL
SUM OF ALL RESPONSES TO PHQ QUESTIONS 1-9: 5

## 2019-01-07 NOTE — PROGRESS NOTES
"  SUBJECTIVE:   Martin Armstrong is a 63 year old male who presents to clinic today for the following health issues:    HPI   Depression Followup    Status since last visit: Stable -\"going okay\" See PHQ-9 for current symptoms.  Other associated symptoms: None    Complicating factors:   Significant life event:  No   Current substance abuse:  None  Anxiety or Panic symptoms:  No    PHQ 4/16/2018 9/28/2018 1/7/2019   PHQ-9 Total Score 5 5 5   Q9: Suicide Ideation Not at all Not at all Not at all     PHQ-9  English  PHQ-9   Any Language  Suicide Assessment Five-step Evaluation and Treatment (SAFE-T)    Hypothyroidism Follow-up    Since last visit, patient describes the following symptoms: Weight stable, no hair loss, no skin changes, no constipation, no loose stools    Problem list and histories reviewed & adjusted, as indicated.  Additional history: as documented    He reports that the only problem he is dealing with is that his son has \"dropped of the grid\" and stopped talking with him and his wife for the past 6 months and he is not sure why. He notes that his son didn't come for the holidays, but will talk to his sisters if they are in the store which is how he knows he is doing okay.    He notes that he has issues with filling his thyroid medication recently and so has been alternating between 112 and 100 and has four 112 pills left and about ten 100 pills left. He notes that he will take his thyroid medications and then wait abut 15-20 minutes until taking any other medications.     He states that he is working on his health, exercising, and eating healthier. He just started taking a multivitamin 3 days ago.     He wakes up at least once a night to urinate.       Patient Active Problem List   Diagnosis     CARDIOVASCULAR SCREENING; LDL GOAL LESS THAN 160     Chemical dependency (H)     DJD (degenerative joint disease) of knee     Abnormal gait     Advanced directives, counseling/discussion     Diverticulosis     " Hypothyroidism     Major depressive disorder     Osteoarthritis of left hand     Status post left partial knee replacement     Ganglion, finger of right hand     Spinal stenosis of lumbar region without neurogenic claudication     Elevated fasting glucose     Past Surgical History:   Procedure Laterality Date     ARTHROPLASTY KNEE UNICOMPARTMENT  11/8/2011    Procedure:ARTHROPLASTY KNEE UNICOMPARTMENT; LEFT KNEE UNICOMPARTMENT ARTHROPLASTY (FREEMAN)^; Surgeon:SHBABIR RADER; Location:[facility] OR     ARTHROSCOPY KNEE RT/LT  1/2006    left     COLONOSCOPY  3/27/08    due in 2013     COLONOSCOPY WITH CO2 INSUFFLATION N/A 11/5/2018    Procedure: screening colonoscopy;  Surgeon: Adrian Hawthorne MD;  Location: [facility] OR       Social History     Tobacco Use     Smoking status: Never Smoker     Smokeless tobacco: Never Used   Substance Use Topics     Alcohol use: No     Family History   Problem Relation Age of Onset     Family History Negative Father      Cancer Mother      Eye Disorder Paternal Grandmother      Asthma No family hx of      C.A.D. No family hx of      Diabetes No family hx of      Hypertension No family hx of      Cerebrovascular Disease No family hx of      Breast Cancer No family hx of      Cancer - colorectal No family hx of      Prostate Cancer No family hx of          Current Outpatient Medications   Medication Sig Dispense Refill     buprenorphine-naloxone (SUBOXONE) 8-2 MG SUBL sublingual tablet        citalopram (CELEXA) 40 MG tablet Take 1 tablet (40 mg) by mouth daily 90 tablet 3     Multiple Vitamins-Minerals (MULTIVITAL PO)        levothyroxine (SYNTHROID/LEVOTHROID) 112 MCG tablet Take 1 tablet (112 mcg) by mouth daily 90 tablet 3     triamcinolone (KENALOG) 0.1 % cream Apply sparingly to affected area three times daily as needed (Patient not taking: Reported on 1/7/2019) 60 g 0     triamcinolone (KENALOG) 0.1 % cream Apply sparingly to affected area three times daily as needed (Patient not taking:  "Reported on 1/7/2019) 80 g 0     No Known Allergies  Recent Labs   Lab Test 01/07/19  1357 12/03/18  0837  02/06/17  0855  11/21/14  1454  04/12/13  0805   LDL  --  77  --  76  --   --   --  125   HDL  --  49  --  43  --   --   --  47   TRIG  --  126  --  177*  --   --   --  78   CR  --   --   --   --   --  0.82  --  0.87   GFRESTIMATED  --   --   --   --   --  >90  Non  GFR Calc    --  >90   GFRESTBLACK  --   --   --   --   --  >90  African American GFR Calc    --  >90   POTASSIUM  --   --   --   --   --  3.7  --  3.8   TSH 1.58 5.95*   < > Canceled, Test credited   Test canceled by physician  CORRECTED ON 02/06 AT 1146: PREVIOUSLY REPORTED AS 15.48     < > 11.66*   < > 12.40*    < > = values in this interval not displayed.      BP Readings from Last 3 Encounters:   01/07/19 132/76   11/05/18 140/88   10/04/18 146/79    Wt Readings from Last 3 Encounters:   01/07/19 79.4 kg (175 lb)   10/04/18 81.6 kg (180 lb)   09/28/18 82.6 kg (182 lb)        ROS:  Constitutional, HEENT, cardiovascular, pulmonary, GI, , musculoskeletal, neuro, skin, endocrine and psych systems are negative, except as otherwise noted.    This document serves as a record of the services and decisions personally performed and made by Nancy Sen CNP. It was created on his/her behalf by Rain Ferris, trained medical scribe. The creation of this document is based the provider's statements to the medical scribes.    Daina Ferris 1:44 PM, January 7, 2019  OBJECTIVE:     /76   Pulse 88   Temp 98.2  F (36.8  C) (Oral)   Resp 16   Ht 1.72 m (5' 7.72\")   Wt 79.4 kg (175 lb)   SpO2 97%   BMI 26.83 kg/m    Body mass index is 26.83 kg/m .     GENERAL: healthy, alert and no distress  HENT: ear canals and TM's normal, nose and mouth without ulcers or lesions  NECK: no adenopathy, no asymmetry, masses, or scars and thyroid normal to palpation  RESP: lungs clear to auscultation - no rales, rhonchi or wheezes  SKIN: no " suspicious lesions or rashes  NEURO: Normal strength and tone, mentation intact and speech normal  PSYCH: mentation appears normal, affect normal/bright    Diagnostic Test Results:  No results found for this or any previous visit (from the past 24 hour(s)).    ASSESSMENT/PLAN:       ICD-10-CM    1. Hypothyroidism, unspecified type E03.9 TSH with free T4 reflex     DISCONTINUED: levothyroxine (SYNTHROID/LEVOTHROID) 112 MCG tablet   2. Major depressive disorder, recurrent episode, moderate (H) F33.1 citalopram (CELEXA) 40 MG tablet     Discussed mood, hypothyroidism, healthy diet, and regular exercise at length with patient today. Updated screening labs; will notify with results.     Mood is good and stable. Medications are well tolerated with no reported side effects. Plan to continue on current Celexa dose; Refill provided.     Medications are well tolerated with no reported side effects. Plan to continue on 112 mcg levothyroxine; Refill provided. Patient has a supply of about 10 tablets of 100 mcg levothyroxine; he will take a 100 mcg once a week till gone. Reviewed directions, benefits, and side effects of medication with patient today.     Patient sees Dr. Handy for Soboxone.     Follow up with PCP in 6 months or sooner with labs prn for medication management visit with routine fasting lab work or prn.     The information in this document, created by the medical scribe for me, accurately reflects the services I personally performed and the decisions made by me. I have reviewed and approved this document for accuracy prior to leaving the patient care area.  Nancy Sen CNP  1:44 PM, 01/07/19    BETH Cook CNP  [facility]

## 2019-01-08 ENCOUNTER — TELEPHONE (OUTPATIENT)
Dept: FAMILY MEDICINE | Facility: CLINIC | Age: 64
End: 2019-01-08

## 2019-01-08 DIAGNOSIS — E03.9 HYPOTHYROIDISM, UNSPECIFIED TYPE: ICD-10-CM

## 2019-01-08 RX ORDER — LEVOTHYROXINE SODIUM 112 UG/1
112 TABLET ORAL DAILY
Qty: 90 TABLET | Refills: 3 | Status: SHIPPED | OUTPATIENT
Start: 2019-01-08 | End: 2020-01-15

## 2019-01-08 NOTE — TELEPHONE ENCOUNTER
Reason for Call:  Other Medication Question     Detailed comments: Patient calling because he thought he should be taking levothyroxine 1 time everyday but the pharmacy is telling the patient the script was written for 1 every other day. Patient would like clarification.        Phone Number Patient can be reached at: Home number on file [phone] (home)    Best Time: Anytime     Can we leave a detailed message on this number? YES    Call taken on 1/8/2019 at 3:35 PM by Kelly Crawford

## 2019-01-08 NOTE — TELEPHONE ENCOUNTER
LM- my error, okay to take 112 mcg pills- I recommend skipping once/week as difficult to adjust between doses. Labs in 2 months. My apologies. Nancy Sen

## 2019-01-08 NOTE — TELEPHONE ENCOUNTER
Celexa:  Sent 1/7/19 with 1 year supply. Refill not appropriate at this time.     Bren Robins, RN, BSN

## 2019-02-19 ENCOUNTER — TELEPHONE (OUTPATIENT)
Dept: FAMILY MEDICINE | Facility: CLINIC | Age: 64
End: 2019-02-19

## 2019-02-19 ENCOUNTER — OFFICE VISIT (OUTPATIENT)
Dept: FAMILY MEDICINE | Facility: CLINIC | Age: 64
End: 2019-02-19
Payer: COMMERCIAL

## 2019-02-19 VITALS
HEART RATE: 80 BPM | WEIGHT: 179 LBS | RESPIRATION RATE: 14 BRPM | DIASTOLIC BLOOD PRESSURE: 86 MMHG | BODY MASS INDEX: 27.44 KG/M2 | TEMPERATURE: 98.4 F | SYSTOLIC BLOOD PRESSURE: 152 MMHG | OXYGEN SATURATION: 97 %

## 2019-02-19 DIAGNOSIS — M25.69 BACK STIFFNESS: ICD-10-CM
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
## 2019-02-19 PROCEDURE — 99214 OFFICE O/P EST MOD 30 MIN: CPT | Performed by: PHYSICIAN ASSISTANT

## 2019-02-19 RX ORDER — CYCLOBENZAPRINE HCL 10 MG
10 TABLET ORAL
Qty: 20 TABLET | Refills: 1 | Status: SHIPPED | OUTPATIENT
Start: 2019-02-19 | End: 2019-03-01

## 2019-02-19 ASSESSMENT — PAIN SCALES - GENERAL: PAINLEVEL: MILD PAIN (2)
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
## 2020-01-13 DIAGNOSIS — E03.9 HYPOTHYROIDISM, UNSPECIFIED TYPE: ICD-10-CM

## 2020-01-15 RX ORDER — LEVOTHYROXINE SODIUM 112 UG/1
TABLET ORAL
Qty: 30 TABLET | Refills: 0 | Status: SHIPPED | OUTPATIENT
Start: 2020-01-15 | End: 2020-02-10

## 2020-01-15 NOTE — TELEPHONE ENCOUNTER
PADMA REFILL: Medication is being filled for 1 time refill only due to:  Needs appointment for further refills. Note sent on sig.    Monico Ledbetter RN, BSN

## 2020-02-05 NOTE — PROGRESS NOTES
"Subjective     Martin Armstrong is a 64 year old male who presents to clinic today for the following health issues:    History of Present Illness        Hypothyroidism:     Since last visit, patient describes the following symptoms::  Depression    He eats 0-1 servings of fruits and vegetables daily.He consumes 1 sweetened beverage(s) daily.He exercises with enough effort to increase his heart rate 20 to 29 minutes per day.  He exercises with enough effort to increase his heart rate 3 or less days per week.   He is taking medications regularly.     Answers for HPI/ROS submitted by the patient on 2/10/2020   Chronic problems general questions HPI Form  If you checked off any problems, how difficult have these problems made it for you to do your work, take care of things at home, or get along with other people?: Somewhat difficult  PHQ9 TOTAL SCORE: 6  MINA 7 TOTAL SCORE: 3    Mood   He relates that things have been good, but his mood has been lower. He endorses that he usually feels more down this time of year, but that most people are. He states \"I feel its all my own doing and that I just get in my head.\" He denies active SI or plan, but that he sometimes thinks that he doesn't want to be around. \"Some days I want to live to 100 and sometimes I just don't care.\" He states his thoughts last about 30 minutes as he is busy and cannot take time to dwell of things.     Chemical dependency  He is taking only one of his suboxone tabs in the morning and one at night. He notes that the suboxone relaxes him actually. He states that going to [employer] has been very helpful for him. He is not going to AA as he didn't like it. He doesn't drink or feel the desire to drink.     He is not exercising, but relates that he is always moving at work other than a 20 minute lunch break.     He complains of right hand pain due to arthritic changes.     Patient Active Problem List   Diagnosis     CARDIOVASCULAR SCREENING; LDL GOAL LESS THAN 160 "     Chemical dependency (H)     DJD (degenerative joint disease) of knee     Abnormal gait     Advanced directives, counseling/discussion     Diverticulosis     Hypothyroidism     Major depressive disorder     Osteoarthritis of left hand     Status post left partial knee replacement     Ganglion, finger of right hand     Spinal stenosis of lumbar region without neurogenic claudication     Elevated fasting glucose     Past Surgical History:   Procedure Laterality Date     ARTHROPLASTY KNEE UNICOMPARTMENT  11/8/2011    Procedure:ARTHROPLASTY KNEE UNICOMPARTMENT; LEFT KNEE UNICOMPARTMENT ARTHROPLASTY (FREEMAN)^; Surgeon:SHABBIR RADER; Location:[facility] OR     ARTHROSCOPY KNEE RT/LT  1/2006    left     COLONOSCOPY  3/27/08    due in 2013     COLONOSCOPY WITH CO2 INSUFFLATION N/A 11/5/2018    Procedure: screening colonoscopy;  Surgeon: Adrian Hawthorne MD;  Location:  OR       Social History     Tobacco Use     Smoking status: Never Smoker     Smokeless tobacco: Never Used   Substance Use Topics     Alcohol use: No     Family History   Problem Relation Age of Onset     Family History Negative Father      Cancer Mother      Eye Disorder Paternal Grandmother      Asthma No family hx of      C.A.D. No family hx of      Diabetes No family hx of      Hypertension No family hx of      Cerebrovascular Disease No family hx of      Breast Cancer No family hx of      Cancer - colorectal No family hx of      Prostate Cancer No family hx of          Current Outpatient Medications   Medication Sig Dispense Refill     buprenorphine-naloxone (SUBOXONE) 8-2 MG SUBL sublingual tablet        FLUoxetine (PROZAC) 40 MG capsule Take 1 capsule (40 mg) by mouth daily 30 capsule 1     levothyroxine (EUTHYROX) 112 MCG tablet Take 1 tablet (112 mcg) by mouth daily 90 tablet 3     Multiple Vitamins-Minerals (MULTIVITAL PO)        triamcinolone (KENALOG) 0.1 % cream Apply sparingly to affected area three times daily as needed (Patient not taking:  "Reported on 2/10/2020) 60 g 0     triamcinolone (KENALOG) 0.1 % cream Apply sparingly to affected area three times daily as needed (Patient not taking: Reported on 2/10/2020) 80 g 0     No Known Allergies  Recent Labs   Lab Test 01/07/19  1357 12/03/18  0837  02/06/17  0855  11/21/14  1454  04/12/13  0805   LDL  --  77  --  76  --   --   --  125   HDL  --  49  --  43  --   --   --  47   TRIG  --  126  --  177*  --   --   --  78   CR  --   --   --   --   --  0.82  --  0.87   GFRESTIMATED  --   --   --   --   --  >90  Non  GFR Calc    --  >90   GFRESTBLACK  --   --   --   --   --  >90  African American GFR Calc    --  >90   POTASSIUM  --   --   --   --   --  3.7  --  3.8   TSH 1.58 5.95*   < > Canceled, Test credited   Test canceled by physician  CORRECTED ON 02/06 AT 1146: PREVIOUSLY REPORTED AS 15.48     < > 11.66*   < > 12.40*    < > = values in this interval not displayed.      BP Readings from Last 3 Encounters:   02/10/20 132/80   02/19/19 152/86   01/07/19 132/76    Wt Readings from Last 3 Encounters:   02/10/20 78.3 kg (172 lb 9.6 oz)   02/19/19 81.2 kg (179 lb)   01/07/19 79.4 kg (175 lb)        Reviewed and updated as needed this visit by Provider         Review of Systems   ROS COMP: Constitutional, HEENT, cardiovascular, pulmonary, GI, , musculoskeletal, neuro, skin, endocrine and psych systems are negative, except as otherwise noted.    This document serves as a record of the services and decisions personally performed and made by Nancy Sen CNP. It was created on his/her behalf by Rain Ferris, trained medical scribe. The creation of this document is based the provider's statements to the medical scribes.    Daina Ferris 9:30 AM, February 10, 2020      Objective    /80   Pulse 76   Temp 98.1  F (36.7  C) (Temporal)   Resp 12   Ht 1.753 m (5' 9\")   Wt 78.3 kg (172 lb 9.6 oz)   SpO2 99%   BMI 25.49 kg/m    Body mass index is 25.49 kg/m .     Physical Exam "   GENERAL: healthy, alert and no distress  EYES: Eyes grossly normal to inspection, PERRL and conjunctivae and sclerae normal  HENT: ear canals and TM's normal, nose and mouth without ulcers or lesions  NECK: no adenopathy, no asymmetry, masses, or scars and thyroid normal to palpation  RESP: lungs clear to auscultation - no rales, rhonchi or wheezes  CV: regular rate and rhythm, normal S1 S2, no S3 or S4, no murmur, click or rub, no peripheral edema and peripheral pulses strong  ABDOMEN: soft, nontender, no hepatosplenomegaly, no masses and bowel sounds normal  MS: no gross musculoskeletal defects noted, no edema  SKIN: no suspicious lesions or rashes  NEURO: Normal strength and tone, mentation intact and speech normal  PSYCH: mentation appears normal, affect normal/bright, insight good, normal grooming    Diagnostic Test Results:  Labs reviewed in Epic  No results found for this or any previous visit (from the past 24 hour(s)).        Assessment & Plan       ICD-10-CM    1. Routine medical exam Z00.00 HIV Screening     TSH WITH FREE T4 REFLEX     Lipid panel reflex to direct LDL Fasting     Prostate spec antigen screen     Glucose     Hemoglobin   2. Hypothyroidism, unspecified type E03.9 levothyroxine (EUTHYROX) 112 MCG tablet     OFFICE/OUTPT VISIT,EST,LEVL IV   3. Chemical dependency (H) F19.20    4. Major depressive disorder, recurrent episode, moderate (H) F33.1 FLUoxetine (PROZAC) 40 MG capsule     OFFICE/OUTPT VISIT,EST,LEVL IV   5. Need for vaccination Z23 INFLUENZA VACCINE IM > 6 MONTHS VALENT IIV4 [79609]     VACCINE ADMINISTRATION, INITIAL     Discussed depressed mood, stable hypothyroidism, chemical dependency on suboxone and weaning off well, healthy diet, and regular exercise at length with patient today.      Physical exam completed today without rectal or  exam as patient deferred. Updated routine fasting screening lab work with one time HIV screening lab placed today; will notify with results.  Discussed costs with patient.    Patient reports mood is low with passive SI without plan. Advised that he stop his Celexa and start Prozac 20 mg; Rx provided. Reviewed directions, benefits, and side effects of medication with patient today. Discussed counseling with patient and he would like to see someone in person, not over the phone; Mental health referral provided for patient to schedule.     Hypothyroidism is well controlled. Medications are well tolerated with no reported side effects. Plan to continue on current doses; Refills provided. Will adjust medication as needed upon lab results.     Influenza vaccination administered today. Discussed Shingrex vaccination with patient today and he will follow up with his pharmacy benefit to receive. Tetanus utd till 2022.    Follow up with PCP in 1 month for mood medication management visit or prn.     The information in this document, created by the medical scribe for me, accurately reflects the services I personally performed and the decisions made by me. I have reviewed and approved this document for accuracy prior to leaving the patient care area.  Nancy Sen CNP  9:30 AM, 02/10/20    BETH Cook [facility] (formerly [facility]3]

## 2020-02-10 ENCOUNTER — OFFICE VISIT (OUTPATIENT)
Dept: FAMILY MEDICINE | Facility: CLINIC | Age: 65
End: 2020-02-10
Payer: COMMERCIAL

## 2020-02-10 VITALS
DIASTOLIC BLOOD PRESSURE: 80 MMHG | HEIGHT: 69 IN | OXYGEN SATURATION: 99 % | TEMPERATURE: 98.1 F | BODY MASS INDEX: 25.56 KG/M2 | HEART RATE: 76 BPM | SYSTOLIC BLOOD PRESSURE: 132 MMHG | RESPIRATION RATE: 12 BRPM | WEIGHT: 172.6 LBS

## 2020-02-10 DIAGNOSIS — F33.1 MAJOR DEPRESSIVE DISORDER, RECURRENT EPISODE, MODERATE (H): ICD-10-CM

## 2020-02-10 DIAGNOSIS — Z00.00 ROUTINE MEDICAL EXAM: Primary | ICD-10-CM

## 2020-02-10 DIAGNOSIS — F19.20 CHEMICAL DEPENDENCY (H): ICD-10-CM

## 2020-02-10 DIAGNOSIS — Z00.00 ROUTINE GENERAL MEDICAL EXAMINATION AT A HEALTH CARE FACILITY: ICD-10-CM

## 2020-02-10 DIAGNOSIS — E03.9 HYPOTHYROIDISM, UNSPECIFIED TYPE: ICD-10-CM

## 2020-02-10 DIAGNOSIS — Z00.01 ENCOUNTER FOR ROUTINE ADULT MEDICAL EXAM WITH ABNORMAL FINDINGS: ICD-10-CM

## 2020-02-10 DIAGNOSIS — Z23 NEED FOR VACCINATION: ICD-10-CM

## 2020-02-10 LAB
CHOLEST SERPL-MCNC: 146 MG/DL
GLUCOSE SERPL-MCNC: 112 MG/DL (ref 70–99)
HDLC SERPL-MCNC: 50 MG/DL
HGB BLD-MCNC: 13.6 G/DL (ref 13.3–17.7)
HIV 1+2 AB+HIV1 P24 AG SERPL QL IA: NONREACTIVE
LDLC SERPL CALC-MCNC: 87 MG/DL
NONHDLC SERPL-MCNC: 96 MG/DL
PSA SERPL-ACNC: 0.93 UG/L (ref 0–4)
TRIGL SERPL-MCNC: 46 MG/DL
TSH SERPL DL<=0.005 MIU/L-ACNC: 1.54 MU/L (ref 0.4–4)

## 2020-02-10 PROCEDURE — 80061 LIPID PANEL: CPT | Performed by: NURSE PRACTITIONER

## 2020-02-10 PROCEDURE — 84443 ASSAY THYROID STIM HORMONE: CPT | Performed by: NURSE PRACTITIONER

## 2020-02-10 PROCEDURE — 36415 COLL VENOUS BLD VENIPUNCTURE: CPT | Performed by: NURSE PRACTITIONER

## 2020-02-10 PROCEDURE — 99214 OFFICE O/P EST MOD 30 MIN: CPT | Mod: 25 | Performed by: NURSE PRACTITIONER

## 2020-02-10 PROCEDURE — 82947 ASSAY GLUCOSE BLOOD QUANT: CPT | Performed by: NURSE PRACTITIONER

## 2020-02-10 PROCEDURE — 96127 BRIEF EMOTIONAL/BEHAV ASSMT: CPT | Performed by: NURSE PRACTITIONER

## 2020-02-10 PROCEDURE — 85018 HEMOGLOBIN: CPT | Performed by: NURSE PRACTITIONER

## 2020-02-10 PROCEDURE — G0103 PSA SCREENING: HCPCS | Performed by: NURSE PRACTITIONER

## 2020-02-10 PROCEDURE — 90686 IIV4 VACC NO PRSV 0.5 ML IM: CPT | Performed by: NURSE PRACTITIONER

## 2020-02-10 PROCEDURE — 99396 PREV VISIT EST AGE 40-64: CPT | Mod: 25 | Performed by: NURSE PRACTITIONER

## 2020-02-10 PROCEDURE — 87389 HIV-1 AG W/HIV-1&-2 AB AG IA: CPT | Performed by: NURSE PRACTITIONER

## 2020-02-10 PROCEDURE — 90471 IMMUNIZATION ADMIN: CPT | Performed by: NURSE PRACTITIONER

## 2020-02-10 RX ORDER — FLUOXETINE 40 MG/1
40 CAPSULE ORAL DAILY
Qty: 30 CAPSULE | Refills: 1 | Status: SHIPPED | OUTPATIENT
Start: 2020-02-10 | End: 2020-04-07

## 2020-02-10 RX ORDER — LEVOTHYROXINE SODIUM 112 UG/1
112 TABLET ORAL DAILY
Qty: 90 TABLET | Refills: 3 | Status: SHIPPED | OUTPATIENT
Start: 2020-02-10 | End: 2021-02-09

## 2020-02-10 ASSESSMENT — ANXIETY QUESTIONNAIRES
5. BEING SO RESTLESS THAT IT IS HARD TO SIT STILL: NOT AT ALL
6. BECOMING EASILY ANNOYED OR IRRITABLE: SEVERAL DAYS
7. FEELING AFRAID AS IF SOMETHING AWFUL MIGHT HAPPEN: NOT AT ALL
1. FEELING NERVOUS, ANXIOUS, OR ON EDGE: NOT AT ALL
GAD7 TOTAL SCORE: 3
4. TROUBLE RELAXING: NOT AT ALL
2. NOT BEING ABLE TO STOP OR CONTROL WORRYING: SEVERAL DAYS
3. WORRYING TOO MUCH ABOUT DIFFERENT THINGS: SEVERAL DAYS
7. FEELING AFRAID AS IF SOMETHING AWFUL MIGHT HAPPEN: NOT AT ALL
GAD7 TOTAL SCORE: 3
GAD7 TOTAL SCORE: 3

## 2020-02-10 ASSESSMENT — PATIENT HEALTH QUESTIONNAIRE - PHQ9
SUM OF ALL RESPONSES TO PHQ QUESTIONS 1-9: 6
10. IF YOU CHECKED OFF ANY PROBLEMS, HOW DIFFICULT HAVE THESE PROBLEMS MADE IT FOR YOU TO DO YOUR WORK, TAKE CARE OF THINGS AT HOME, OR GET ALONG WITH OTHER PEOPLE: SOMEWHAT DIFFICULT
SUM OF ALL RESPONSES TO PHQ QUESTIONS 1-9: 6

## 2020-02-10 ASSESSMENT — MIFFLIN-ST. JEOR: SCORE: 1568.29

## 2020-02-10 NOTE — PROGRESS NOTES
"SUBJECTIVE:   CC: Martin Armstrong is an 64 year old male who presents for preventative health visit.     Healthy Habits:     Getting at least 3 servings of Calcium per day:  Yes    Bi-annual eye exam:  Yes    Dental care twice a year:  Yes    Sleep apnea or symptoms of sleep apnea:  None    Diet:  Regular (no restrictions)    Frequency of exercise:  None    Taking medications regularly:  0    Barriers to taking medications:  Not applicable    Medication side effects:  Not applicable    PHQ-2 Total Score: 2  History of Present Illness        Hypothyroidism:     Since last visit, patient describes the following symptoms::  Depression    He eats 0-1 servings of fruits and vegetables daily.He consumes 1 sweetened beverage(s) daily.He exercises with enough effort to increase his heart rate 20 to 29 minutes per day.  He exercises with enough effort to increase his heart rate 3 or less days per week.   He is taking medications regularly.  He is not taking prescribed medications regularly due to Not applicable.    Answers for HPI/ROS submitted by the patient on 2/10/2020   Chronic problems general questions HPI Form  If you checked off any problems, how difficult have these problems made it for you to do your work, take care of things at home, or get along with other people?: Somewhat difficult  PHQ9 TOTAL SCORE: 6  MINA 7 TOTAL SCORE: 3     Mood   He relates that things have been good, but his mood has been lower. He endorses that he usually feels more down this time of year, but that most people are. He states \"I feel its all my own doing and that I just get in my head.\" He denies active SI or plan, but that he sometimes thinks that he doesn't want to be around. \"Some days I want to live to 100 and sometimes I just don't care.\" He states his thoughts last about 30 minutes as he is busy and cannot take time to dwell of things.      Chemical dependency  He is taking only one of his suboxone tabs in the morning and one at " night. He notes that the suboxone relaxes him actually. He states that going to Buddhism has been very helpful for him. He is not going to AA as he didn't like it. He doesn't drink or feel the desire to drink.      He is not exercising, but relates that he is always moving at work other than a 20 minute lunch break.      He complains of right hand pain due to arthritic changes.         Today's PHQ-2 Score:   PHQ-2 ( 1999 Pfizer) 2/10/2020   Q1: Little interest or pleasure in doing things 1   Q2: Feeling down, depressed or hopeless 1   PHQ-2 Score 2   Q1: Little interest or pleasure in doing things Several days   Q2: Feeling down, depressed or hopeless Several days   PHQ-2 Score 2       Abuse: Current or Past(Physical, Sexual or Emotional)- NO  Do you feel safe in your environment? Yes        Social History     Tobacco Use     Smoking status: Never Smoker     Smokeless tobacco: Never Used   Substance Use Topics     Alcohol use: No         No flowsheet data found.    Last PSA:   PSA   Date Value Ref Range Status   03/16/2018 0.18 0 - 4 ug/L Final     Comment:     Assay Method:  Chemiluminescence using Siemens Vista analyzer       Reviewed orders with patient. Reviewed health maintenance and updated orders accordingly - Yes  BP Readings from Last 3 Encounters:   02/10/20 132/80   02/19/19 152/86   01/07/19 132/76    Wt Readings from Last 3 Encounters:   02/10/20 78.3 kg (172 lb 9.6 oz)   02/19/19 81.2 kg (179 lb)   01/07/19 79.4 kg (175 lb)                  Patient Active Problem List   Diagnosis     CARDIOVASCULAR SCREENING; LDL GOAL LESS THAN 160     Chemical dependency (H)     DJD (degenerative joint disease) of knee     Abnormal gait     Advanced directives, counseling/discussion     Diverticulosis     Hypothyroidism     Major depressive disorder     Osteoarthritis of left hand     Status post left partial knee replacement     Ganglion, finger of right hand     Spinal stenosis of lumbar region without neurogenic  claudication     Elevated fasting glucose     Past Surgical History:   Procedure Laterality Date     ARTHROPLASTY KNEE UNICOMPARTMENT  11/8/2011    Procedure:ARTHROPLASTY KNEE UNICOMPARTMENT; LEFT KNEE UNICOMPARTMENT ARTHROPLASTY (FREEMAN)^; Surgeon:SHABBIR RADER; Location:[facility] OR     ARTHROSCOPY KNEE RT/LT  1/2006    left     COLONOSCOPY  3/27/08    due in 2013     COLONOSCOPY WITH CO2 INSUFFLATION N/A 11/5/2018    Procedure: screening colonoscopy;  Surgeon: Adrian Hawthorne MD;  Location: [facility] OR       Social History     Tobacco Use     Smoking status: Never Smoker     Smokeless tobacco: Never Used   Substance Use Topics     Alcohol use: No     Family History   Problem Relation Age of Onset     Family History Negative Father      Cancer Mother      Eye Disorder Paternal Grandmother      Asthma No family hx of      C.A.D. No family hx of      Diabetes No family hx of      Hypertension No family hx of      Cerebrovascular Disease No family hx of      Breast Cancer No family hx of      Cancer - colorectal No family hx of      Prostate Cancer No family hx of          Current Outpatient Medications   Medication Sig Dispense Refill     buprenorphine-naloxone (SUBOXONE) 8-2 MG SUBL sublingual tablet        FLUoxetine (PROZAC) 40 MG capsule Take 1 capsule (40 mg) by mouth daily 30 capsule 1     levothyroxine (EUTHYROX) 112 MCG tablet Take 1 tablet (112 mcg) by mouth daily 90 tablet 3     Multiple Vitamins-Minerals (MULTIVITAL PO)        triamcinolone (KENALOG) 0.1 % cream Apply sparingly to affected area three times daily as needed (Patient not taking: Reported on 2/10/2020) 60 g 0     triamcinolone (KENALOG) 0.1 % cream Apply sparingly to affected area three times daily as needed (Patient not taking: Reported on 2/10/2020) 80 g 0     No Known Allergies  Recent Labs   Lab Test 02/10/20  0958 01/07/19  1357 12/03/18  0837  02/06/17  0855  11/21/14  1454  04/12/13  0805   LDL 87  --  77  --  76  --   --   --  125   HDL 50   --  49  --  43  --   --   --  47   TRIG 46  --  126  --  177*  --   --   --  78   CR  --   --   --   --   --   --  0.82  --  0.87   GFRESTIMATED  --   --   --   --   --   --  >90  Non  GFR Calc    --  >90   GFRESTBLACK  --   --   --   --   --   --  >90  African American GFR Calc    --  >90   POTASSIUM  --   --   --   --   --   --  3.7  --  3.8   TSH 1.54 1.58 5.95*   < > Canceled, Test credited   Test canceled by physician  CORRECTED ON 02/06 AT 1146: PREVIOUSLY REPORTED AS 15.48     < > 11.66*   < > 12.40*    < > = values in this interval not displayed.        Reviewed and updated as needed this visit by clinical staff  Tobacco  Allergies  Meds  Med Hx  Surg Hx  Fam Hx  Soc Hx        Reviewed and updated as needed this visit by Provider        Past Medical History:   Diagnosis Date     Chemical dependency (H)     remission- 2008, suboxone     Diverticulosis      DJD (degenerative joint disease) of knee      Thyroid disease       Past Surgical History:   Procedure Laterality Date     ARTHROPLASTY KNEE UNICOMPARTMENT  11/8/2011    Procedure:ARTHROPLASTY KNEE UNICOMPARTMENT; LEFT KNEE UNICOMPARTMENT ARTHROPLASTY (FREEMAN)^; Surgeon:SHABBIR RADER; Location: OR     ARTHROSCOPY KNEE RT/LT  1/2006    left     COLONOSCOPY  3/27/08    due in 2013     COLONOSCOPY WITH CO2 INSUFFLATION N/A 11/5/2018    Procedure: screening colonoscopy;  Surgeon: Adrian Hawthorne MD;  Location:  OR       Review of Systems  CONSTITUTIONAL: NEGATIVE for fever, chills, change in weight  INTEGUMENTARY/SKIN: NEGATIVE for worrisome rashes, moles or lesions  EYES: NEGATIVE for vision changes or irritation  ENT: NEGATIVE for ear, mouth and throat problems  RESP: NEGATIVE for significant cough or SOB  CV: NEGATIVE for chest pain, palpitations or peripheral edema  GI: NEGATIVE for nausea, abdominal pain, heartburn, or change in bowel habits   male: negative for dysuria, hematuria, decreased urinary stream, erectile  "dysfunction, urethral discharge  MUSCULOSKELETAL: NEGATIVE for significant arthralgias or myalgia  NEURO: NEGATIVE for weakness, dizziness or paresthesias  PSYCHIATRIC: NEGATIVE for changes in mood or affect    This document serves as a record of the services and decisions personally performed and made by Nancy Sen CNP. It was created on his/her behalf by Rain Ferris, trained medical scribe. The creation of this document is based the provider's statements to the medical scribes.    Scribserenity Ferris 9:30 AM, February 10, 2020  OBJECTIVE:   /80   Pulse 76   Temp 98.1  F (36.7  C) (Temporal)   Resp 12   Ht 1.753 m (5' 9\")   Wt 78.3 kg (172 lb 9.6 oz)   SpO2 99%   BMI 25.49 kg/m      Physical Exam  GENERAL: healthy, alert and no distress  EYES: Eyes grossly normal to inspection, PERRL and conjunctivae and sclerae normal  HENT: ear canals and TM's normal, nose and mouth without ulcers or lesions  NECK: no adenopathy, no asymmetry, masses, or scars and thyroid normal to palpation  RESP: lungs clear to auscultation - no rales, rhonchi or wheezes  CV: regular rate and rhythm, normal S1 S2, no S3 or S4, no murmur, click or rub, no peripheral edema and peripheral pulses strong  ABDOMEN: soft, nontender, no hepatosplenomegaly, no masses and bowel sounds normal  Rectal: pt. Deferred   MS: no gross musculoskeletal defects noted, no edema  SKIN: no suspicious lesions or rashes  NEURO: Normal strength and tone, mentation intact and speech normal  PSYCH: mentation appears normal, affect normal/bright, insight good, normal grooming    Diagnostic Test Results:  Labs reviewed in Epic  Results for orders placed or performed in visit on 02/10/20 (from the past 24 hour(s))   TSH WITH FREE T4 REFLEX   Result Value Ref Range    TSH 1.54 0.40 - 4.00 mU/L   Lipid panel reflex to direct LDL Fasting   Result Value Ref Range    Cholesterol 146 <200 mg/dL    Triglycerides 46 <150 mg/dL    HDL Cholesterol 50 >39 mg/dL    " LDL Cholesterol Calculated 87 <100 mg/dL    Non HDL Cholesterol 96 <130 mg/dL   Prostate spec antigen screen   Result Value Ref Range    PSA 0.93 0 - 4 ug/L   Glucose   Result Value Ref Range    Glucose 112 (H) 70 - 99 mg/dL   Hemoglobin   Result Value Ref Range    Hemoglobin 13.6 13.3 - 17.7 g/dL       ASSESSMENT/PLAN:       ICD-10-CM    1. Routine medical exam Z00.00 HIV Screening     TSH WITH FREE T4 REFLEX     Lipid panel reflex to direct LDL Fasting     Prostate spec antigen screen     Glucose     Hemoglobin   2. Hypothyroidism, unspecified type E03.9 levothyroxine (EUTHYROX) 112 MCG tablet     OFFICE/OUTPT VISIT,EST,LEVL IV   3. Chemical dependency (H) F19.20 MENTAL HEALTH REFERRAL  - Adult; Outpatient Treatment; Individual/Couples/Family/Group Therapy/Health Psychology[place] [facility] [phone]; We will contact you to schedule the appointment or please call with any questions   4. Major depressive disorder, recurrent episode, moderate (H) F33.1 FLUoxetine (PROZAC) 40 MG capsule     OFFICE/OUTPT VISIT,EST,LEVL IV     MENTAL HEALTH REFERRAL  - Adult; Outpatient Treatment; Individual/Couples/Family/Group Therapy/Health Psychology[place] [facility] [place] [facility] [phone]; We will contact you to schedule the appointment or please call with any questions   5. Need for vaccination Z23 INFLUENZA VACCINE IM > 6 MONTHS VALENT IIV4 [[id number]]     VACCINE ADMINISTRATION, INITIAL   6. Routine general medical examination at a health care facility Z00.00    7. Encounter for routine adult medical exam with abnormal findings Z00.01      Discussed depressed mood, stable hypothyroidism, chemical dependency on suboxone and weaning off well, healthy diet, and regular exercise at length with patient today.       Physical exam completed today without rectal or  exam as patient deferred. Updated routine fasting screening lab work with one time HIV screening lab placed today; will notify with results.  "Discussed costs with patient.     Patient reports mood is low with passive SI without plan. Advised that he stop his Celexa and start Prozac 20 mg; Rx provided. Reviewed directions, benefits, and side effects of medication with patient today. Discussed counseling with patient and he would like to see someone in person, not over the phone; Mental health referral provided for patient to schedule.      Hypothyroidism is well controlled. Medications are well tolerated with no reported side effects. Plan to continue on current doses; Refills provided. Will adjust medication as needed upon lab results.      Influenza vaccination administered today. Discussed Shingrex vaccination with patient today and he will follow up with his pharmacy benefit to receive. Tetanus utd till 2022.     Follow up with PCP in 1 month for mood medication management visit or prn.        COUNSELING:   Reviewed preventive health counseling, as reflected in patient instructions       Regular exercise       Healthy diet/nutrition       Immunizations    Vaccinated for: Influenza         Safe sex practices/STD prevention       Colon cancer screening       Prostate cancer screening    Estimated body mass index is 25.49 kg/m  as calculated from the following:    Height as of this encounter: 1.753 m (5' 9\").    Weight as of this encounter: 78.3 kg (172 lb 9.6 oz).      reports that he has never smoked. He has never used smokeless tobacco.      Counseling Resources:  ATP IV Guidelines  Pooled Cohorts Equation Calculator  FRAX Risk Assessment  ICSI Preventive Guidelines  Dietary Guidelines for Americans, 2010  USDA's MyPlate  ASA Prophylaxis  Lung CA Screening    The information in this document, created by the medical scribe for me, accurately reflects the services I personally performed and the decisions made by me. I have reviewed and approved this document for accuracy prior to leaving the patient care area.  Nancy Sen CNP  9:30 AM, 02/10/20    Nancy " BETH [facility]

## 2020-02-10 NOTE — PROGRESS NOTES
"  3  SUBJECTIVE:   CC: Martin Armstrong is an 64 year old male who presents for preventive health visit.     Healthy Habits:    Do you get at least three servings of calcium containing foods daily (dairy, green leafy vegetables, etc.)? { :831922::\"yes\"}    Amount of exercise or daily activities, outside of work: { :351476}    Problems taking medications regularly { :998675::\"No\"}    Medication side effects: { :148732::\"No\"}    Have you had an eye exam in the past two years? { :780561}    Do you see a dentist twice per year? { :911788}    Do you have sleep apnea, excessive snoring or daytime drowsiness?{ :580468}  {Outside tests to abstract? :352527}    {additional problems to add (Optional):561557}    Today's PHQ-2 Score:   PHQ-2 ( 1999 Pfizer) 2/10/2020 1/7/2019   Q1: Little interest or pleasure in doing things 1 1   Q2: Feeling down, depressed or hopeless 1 1   PHQ-2 Score 2 2   Q1: Little interest or pleasure in doing things Several days -   Q2: Feeling down, depressed or hopeless Several days -   PHQ-2 Score 2 -     {PHQ-2 LOOK IN ASSESSMENTS (Optional) :580153}  Abuse: Current or Past(Physical, Sexual or Emotional)- {YES/NO/NA:192009}  Do you feel safe in your environment? {YES/NO/NA:889218}        Social History     Tobacco Use     Smoking status: Never Smoker     Smokeless tobacco: Never Used   Substance Use Topics     Alcohol use: No     If you drink alcohol do you typically have >3 drinks per day or >7 drinks per week? {ETOH :181953}                      Last PSA:   PSA   Date Value Ref Range Status   03/16/2018 0.18 0 - 4 ug/L Final     Comment:     Assay Method:  Chemiluminescence using Siemens Vista analyzer       Reviewed orders with patient. Reviewed health maintenance and updated orders accordingly - {Yes/No:078689::\"Yes\"}  {Chronicprobdata (Optional):127523}    Reviewed and updated as needed this visit by clinical staff  Tobacco  Allergies  Meds  Med Hx  Surg Hx  Fam Hx  Soc Hx        Reviewed " "and updated as needed this visit by Provider        {HISTORY OPTIONS (Optional):195679}    ROS:  { :998984::\"CONSTITUTIONAL: NEGATIVE for fever, chills, change in weight\",\"INTEGUMENTARY/SKIN: NEGATIVE for worrisome rashes, moles or lesions\",\"EYES: NEGATIVE for vision changes or irritation\",\"ENT: NEGATIVE for ear, mouth and throat problems\",\"RESP: NEGATIVE for significant cough or SOB\",\"CV: NEGATIVE for chest pain, palpitations or peripheral edema\",\"GI: NEGATIVE for nausea, abdominal pain, heartburn, or change in bowel habits\",\" male: negative for dysuria, hematuria, decreased urinary stream, erectile dysfunction, urethral discharge\",\"MUSCULOSKELETAL: NEGATIVE for significant arthralgias or myalgia\",\"NEURO: NEGATIVE for weakness, dizziness or paresthesias\",\"PSYCHIATRIC: NEGATIVE for changes in mood or affect\"}    OBJECTIVE:   /80   Pulse 76   Temp 98.1  F (36.7  C) (Temporal)   Resp 12   Ht 1.753 m (5' 9\")   Wt 78.3 kg (172 lb 9.6 oz)   SpO2 99%   BMI 25.49 kg/m    EXAM:  {Exam Choices:110789}    {Diagnostic Test Results (Optional):076284::\"Diagnostic Test Results:\",\"Labs reviewed in Epic\"}    ASSESSMENT/PLAN:   {Diag Picklist:167889}    COUNSELING:  {MALE COUNSELING MESSAGES:199122::\"Reviewed preventive health counseling, as reflected in patient instructions\"}    Estimated body mass index is 25.49 kg/m  as calculated from the following:    Height as of this encounter: 1.753 m (5' 9\").    Weight as of this encounter: 78.3 kg (172 lb 9.6 oz).    {Weight Management Plan (ACO) Complete if BMI is abnormal-  Ages 18-64  BMI >24.9.  Age 65+ with BMI <23 or >30 (Optional):735646}     reports that he has never smoked. He has never used smokeless tobacco.  {Tobacco Cessation -- Complete if patient is a smoker (Optional):915081}    Counseling Resources:  ATP IV Guidelines  Pooled Cohorts Equation Calculator  FRAX Risk Assessment  ICSI Preventive Guidelines  Dietary Guidelines for Americans, 2010  USDA's " MyPlate  ASA Prophylaxis  Lung CA Screening    Nancy Sen, APRN CNP  [facility]

## 2020-02-11 ASSESSMENT — ANXIETY QUESTIONNAIRES: GAD7 TOTAL SCORE: 3

## 2020-03-12 ENCOUNTER — OFFICE VISIT (OUTPATIENT)
Dept: PSYCHOLOGY | Facility: CLINIC | Age: 65
End: 2020-03-12
Attending: NURSE PRACTITIONER
Payer: COMMERCIAL

## 2020-03-12 DIAGNOSIS — F43.22 ADJUSTMENT DISORDER WITH ANXIETY: ICD-10-CM

## 2020-03-12 DIAGNOSIS — F33.41 RECURRENT MAJOR DEPRESSION IN PARTIAL REMISSION (H): Primary | ICD-10-CM

## 2020-03-12 PROCEDURE — 90834 PSYTX W PT 45 MINUTES: CPT | Performed by: MARRIAGE & FAMILY THERAPIST

## 2020-03-12 ASSESSMENT — ANXIETY QUESTIONNAIRES
2. NOT BEING ABLE TO STOP OR CONTROL WORRYING: NOT AT ALL
7. FEELING AFRAID AS IF SOMETHING AWFUL MIGHT HAPPEN: NOT AT ALL
6. BECOMING EASILY ANNOYED OR IRRITABLE: NOT AT ALL
4. TROUBLE RELAXING: SEVERAL DAYS
5. BEING SO RESTLESS THAT IT IS HARD TO SIT STILL: NOT AT ALL
GAD7 TOTAL SCORE: 2
IF YOU CHECKED OFF ANY PROBLEMS ON THIS QUESTIONNAIRE, HOW DIFFICULT HAVE THESE PROBLEMS MADE IT FOR YOU TO DO YOUR WORK, TAKE CARE OF THINGS AT HOME, OR GET ALONG WITH OTHER PEOPLE: NOT DIFFICULT AT ALL
1. FEELING NERVOUS, ANXIOUS, OR ON EDGE: NOT AT ALL
3. WORRYING TOO MUCH ABOUT DIFFERENT THINGS: SEVERAL DAYS

## 2020-03-12 ASSESSMENT — COLUMBIA-SUICIDE SEVERITY RATING SCALE - C-SSRS
TOTAL  NUMBER OF INTERRUPTED ATTEMPTS PAST 3 MONTHS: NO
2. HAVE YOU ACTUALLY HAD ANY THOUGHTS OF KILLING YOURSELF?: NO
TOTAL  NUMBER OF ABORTED OR SELF INTERRUPTED ATTEMPTS PAST LIFETIME: NO
5. HAVE YOU STARTED TO WORK OUT OR WORKED OUT THE DETAILS OF HOW TO KILL YOURSELF? DO YOU INTEND TO CARRY OUT THIS PLAN?: NO
5. HAVE YOU STARTED TO WORK OUT OR WORKED OUT THE DETAILS OF HOW TO KILL YOURSELF? DO YOU INTEND TO CARRY OUT THIS PLAN?: NO
1. IN THE PAST MONTH, HAVE YOU WISHED YOU WERE DEAD OR WISHED YOU COULD GO TO SLEEP AND NOT WAKE UP?: NO
2. HAVE YOU ACTUALLY HAD ANY THOUGHTS OF KILLING YOURSELF LIFETIME?: YES
3. HAVE YOU BEEN THINKING ABOUT HOW YOU MIGHT KILL YOURSELF?: NO
ATTEMPT PAST THREE MONTHS: NO
6. HAVE YOU EVER DONE ANYTHING, STARTED TO DO ANYTHING, OR PREPARED TO DO ANYTHING TO END YOUR LIFE?: NO
6. HAVE YOU EVER DONE ANYTHING, STARTED TO DO ANYTHING, OR PREPARED TO DO ANYTHING TO END YOUR LIFE?: NO
TOTAL  NUMBER OF INTERRUPTED ATTEMPTS LIFETIME: NO
4. HAVE YOU HAD THESE THOUGHTS AND HAD SOME INTENTION OF ACTING ON THEM?: NO
TOTAL  NUMBER OF ABORTED OR SELF INTERRUPTED ATTEMPTS PAST 3 MONTHS: NO
1. IN THE PAST MONTH, HAVE YOU WISHED YOU WERE DEAD OR WISHED YOU COULD GO TO SLEEP AND NOT WAKE UP?: NO
4. HAVE YOU HAD THESE THOUGHTS AND HAD SOME INTENTION OF ACTING ON THEM?: NO
ATTEMPT LIFETIME: NO
REASONS FOR IDEATION LIFETIME: MOSTLY TO END OR STOP THE PAIN (YOU COULDN'T GO ON LIVING WITH THE PAIN OR HOW YOU WERE FEELING)

## 2020-03-12 ASSESSMENT — PATIENT HEALTH QUESTIONNAIRE - PHQ9: SUM OF ALL RESPONSES TO PHQ QUESTIONS 1-9: 1

## 2020-03-12 NOTE — PROGRESS NOTES
"               Progress Note - Initial Session    Client Name:  Martin Armstrong Date: 3/12/2020         Service Type: Individual  Video Visit: No     Session Start Time: 9:30 a.m.  Session End Time: 10:30 a.m.     Session Length: 60 minutes    Session #: 1    Attendees: Client attended alone     DATA:  Diagnostic Assessment in progress.  Unable to complete documentation at the conclusion of the first session due to needing additional assessment time    Interactive Complexity: No  Crisis: No    Intervention:  CBT: connecting thoughts, feelings, and behaviors  Psychodynamic: family systems work  Solution Focused: identify solvable problems and generate possible solutions  Narrative Therapy: separate problem from person and look for bright spots in story    ASSESSMENT:  Mental Status Assessment:  Appearance:   Appropriate   Eye Contact:   Good   Psychomotor Behavior: Agitated  Restless   Attitude:   Cooperative  Guarded   Orientation:   All  Speech   Rate / Production: Hyperverbal  Pressured    Volume:  Normal   Mood:    Anxious  Irritable   Affect:    Appropriate  Expansive   Thought Content:  Rumination   Thought Form:  Blocking  Coherent  Logical   Insight:    Fair       Safety Issues and Plan for Safety and Risk Management:  Client denies current fears or concerns for personal safety.  Client denies current or recent suicidal ideation or behaviors. Patient states that he has felt much better for the past month but states that for the six months before that time, he was feeling like he \"didn't want to be around\" anymore, although patient denies having a plan/method/desire to die.  Client denies current or recent homicidal ideation or behaviors.  Client denies current or recent self injurious behavior or ideation.  Client denies other safety concerns.  Recommended that patient call 911 or go to the local ED should there be a change in any of these risk factors.  Client reports there are firearms in the house. The " firearms are secured in a locked space.      Diagnostic Criteria:  A) Recurrent episode(s) - symptoms have been present during the same 2-week period and represent a change from previous functioning 5 or more symptoms (required for diagnosis)   - Depressed mood. Note: In children and adolescents, can be irritable mood.     - Diminished interest or pleasure in all, or almost all, activities.    - Fatigue or loss of energy.    - Feelings of worthlessness or inappropriate and excessive guilt.    - Diminished ability to think or concentrate, or indecisiveness.    - Recurrent thoughts of death (not just fear of dying), recurrent suicidal ideation without a specific plan, or a suicide attempt or a specific plan for committing suicide.   B) The symptoms cause clinically significant distress or impairment in social, occupational, or other important areas of functioning  C) The episode is not attributable to the physiological effects of a substance or to another medical condition  D) The occurence of major depressive episode is not better explained by other thought / psychotic disorders  E) There has never been a manic episode or hypomanic episode  A. The development of emotional or behavioral symptoms in response to an identifiable stressor(s) occurring within 3 months of the onset of the stressor(s)  B. These symptoms or behaviors are clinically significant, as evidenced by one or both of the following:       - Significant impairment in social, occupational, or other important areas of functioning  C. The stress-related disturbance does not meet criteria for another disorder & is not not an exacerbation of another mental disorder  D. The symptoms do not represent normal bereavement  E. Once the stressor or its consequences have terminated, the symptoms do not persist for more than an additional 6 months       * Adjustment Disorder with Anxiety: The predominant manfestations are symptoms such as nervousness, worry, or  farzana, or, in children separation anxiety from major attachment figures      DSM5 Diagnoses: (Sustained by DSM5 Criteria Listed Above)  Diagnoses: 296.35 (F33.41)  Major Depressive Disorder, Recurrent Episode, In partial remission _  Adjustment Disorders  309.24 (F43.22) With anxiety  Psychosocial & Contextual Factors: patient works 58-65 hours per week and enjoys his job but states that his coworkers are largely racist and misogynistic; recent relationship issues with wife regarding her helping her stepson; loss of both parents; loss of son, who  at age 10 following a house fire; past chemical health issues/is now in long-term recovery    WHODAS 2.0 Total Score 3/12/2020   Total Score 23     PHQ 2019 2/10/2020 3/12/2020   PHQ-9 Total Score 5 6 1   Q9: Thoughts of better off dead/self-harm past 2 weeks Not at all Several days Not at all   F/U: Thoughts of suicide or self-harm - No -   F/U: Safety concerns - No -     MINA-7 SCORE 2018 2/10/2020 3/12/2020   Total Score - - -   Total Score 6 (mild anxiety) 3 (minimal anxiety) -   Total Score 6 3 2       Collateral Reports Completed:  Routed note to PCP      PLAN: (Homework, other):  Patient reports that his goals for the next week include:  1. Monitoring the level of his depression/intrusive thoughts, as they seem to have greatly reduced due to his medication change a month ago  2. Challenge travel-related anxiety  3. Decrease time spent in contact with negative coworkers      Sima Reddy

## 2020-03-12 NOTE — PATIENT INSTRUCTIONS
Patient reports that his goals for the next week include:  1. Monitoring the level of his depression/intrusive thoughts, as they seem to have greatly reduced due to his medication change a month ago  2. Challenge travel-related anxiety  3. Decrease time spent in contact with negative coworkers

## 2020-03-13 ASSESSMENT — ANXIETY QUESTIONNAIRES: GAD7 TOTAL SCORE: 2

## 2020-03-27 ENCOUNTER — TELEPHONE (OUTPATIENT)
Dept: FAMILY MEDICINE | Facility: CLINIC | Age: 65
End: 2020-03-27

## 2020-03-27 NOTE — TELEPHONE ENCOUNTER
Reason for Call:  Medication or medication refill:    Do you use a [facility]?  Name of the pharmacy and phone number for the current request:  [facility] - [phone]    Name of the medication requested: fluoxetine    Other request: switched from Celexa to the fluoxetine. He states they are going well with this new medication. He would like to continue on it    Can we leave a detailed message on this number? YES    Phone number patient can be reached at: Home number on file [phone] (home)    Best Time: any    Call taken on 3/27/2020 at 1:33 PM by Diana Chavarria

## 2020-03-27 NOTE — TELEPHONE ENCOUNTER
Left message for pt.   Please assist scheduling televisit as noted below from provider.     Rodger Hughes MA

## 2020-04-06 ENCOUNTER — TELEPHONE (OUTPATIENT)
Dept: FAMILY MEDICINE | Facility: CLINIC | Age: 65
End: 2020-04-06

## 2020-04-06 DIAGNOSIS — F33.1 MAJOR DEPRESSIVE DISORDER, RECURRENT EPISODE, MODERATE (H): ICD-10-CM

## 2020-04-06 NOTE — TELEPHONE ENCOUNTER
Routing refill request to provider for review/approval because:  Patient needs to be seen because:  Overdue for mood follow up    Bren Robins, RN, BSN

## 2020-04-07 RX ORDER — FLUOXETINE 40 MG/1
CAPSULE ORAL
Qty: 30 CAPSULE | Refills: 0 | Status: SHIPPED | OUTPATIENT
Start: 2020-04-07 | End: 2020-04-10

## 2020-04-10 ENCOUNTER — FCC EXTENDED DOCUMENTATION (OUTPATIENT)
Dept: PSYCHOLOGY | Facility: CLINIC | Age: 65
End: 2020-04-10

## 2020-04-10 ENCOUNTER — VIRTUAL VISIT (OUTPATIENT)
Dept: FAMILY MEDICINE | Facility: OTHER | Age: 65
End: 2020-04-10

## 2020-04-10 DIAGNOSIS — R73.03 PRE-DIABETES: ICD-10-CM

## 2020-04-10 DIAGNOSIS — E03.9 HYPOTHYROIDISM, UNSPECIFIED TYPE: ICD-10-CM

## 2020-04-10 DIAGNOSIS — F33.1 MAJOR DEPRESSIVE DISORDER, RECURRENT EPISODE, MODERATE (H): Primary | ICD-10-CM

## 2020-04-10 PROCEDURE — 96127 BRIEF EMOTIONAL/BEHAV ASSMT: CPT | Performed by: NURSE PRACTITIONER

## 2020-04-10 PROCEDURE — 99214 OFFICE O/P EST MOD 30 MIN: CPT | Mod: 95 | Performed by: NURSE PRACTITIONER

## 2020-04-10 RX ORDER — FLUOXETINE 40 MG/1
CAPSULE ORAL
Qty: 90 CAPSULE | Refills: 1 | Status: SHIPPED | OUTPATIENT
Start: 2020-04-10 | End: 2020-11-06

## 2020-04-10 ASSESSMENT — PATIENT HEALTH QUESTIONNAIRE - PHQ9
SUM OF ALL RESPONSES TO PHQ QUESTIONS 1-9: 2
5. POOR APPETITE OR OVEREATING: NOT AT ALL

## 2020-04-10 ASSESSMENT — ANXIETY QUESTIONNAIRES
3. WORRYING TOO MUCH ABOUT DIFFERENT THINGS: SEVERAL DAYS
6. BECOMING EASILY ANNOYED OR IRRITABLE: SEVERAL DAYS
2. NOT BEING ABLE TO STOP OR CONTROL WORRYING: NOT AT ALL
7. FEELING AFRAID AS IF SOMETHING AWFUL MIGHT HAPPEN: NOT AT ALL
5. BEING SO RESTLESS THAT IT IS HARD TO SIT STILL: NOT AT ALL
IF YOU CHECKED OFF ANY PROBLEMS ON THIS QUESTIONNAIRE, HOW DIFFICULT HAVE THESE PROBLEMS MADE IT FOR YOU TO DO YOUR WORK, TAKE CARE OF THINGS AT HOME, OR GET ALONG WITH OTHER PEOPLE: NOT DIFFICULT AT ALL
GAD7 TOTAL SCORE: 3
1. FEELING NERVOUS, ANXIOUS, OR ON EDGE: SEVERAL DAYS

## 2020-04-10 NOTE — PROGRESS NOTES
Discharge Summary  Single Session    Client Name: Martin Armstrong MRN#: [id number] YOB: 1955      Intake / Discharge Date: 3/12/2020 & 4/10/2020      DSM5 Diagnoses: (Sustained by DSM5 Criteria Listed Above)  Diagnoses: 296.35 (F33.41)  Major Depressive Disorder, Recurrent Episode, In partial remission _  Adjustment Disorders  309.24 (F43.22) With anxiety  Psychosocial & Contextual Factors: patient works 58-65 hours per week and enjoys his job but states that his coworkers are largely racist and misogynistic; recent relationship issues with wife regarding her helping her stepson; loss of both parents; loss of son, who  at age 10 following a house fire; past chemical health issues/is now in long-term recovery  WHODAS 2.0 Total Score 3/12/2020   Total Score 23           Presenting Concern: Patient had been feeling more depressed than usual but changed his medication and reports that his mood improved significantly    Reason for Discharge:  Client is satisfied with progress and Client did not return      Disposition at Time of Last Encounter:   Comments:   Patient appeared to be engaged in significant, meaningful activity and had only minor work concerns     Risk Management:   Client has had a history of suicidal ideation: following the death of his son but has not had any risk for several years  Recommended that patient call 911 or go to the local ED should there be a change in any of these risk factors.      Referred To:  PCP and his MAT clinic through Dayanna Reddy   4/10/2020

## 2020-04-10 NOTE — PROGRESS NOTES
Subjective     Martin Armstrong is a 64 year old male who presents to clinic today for the following health issues:    HPI   Depression Followup    How are you doing with your depression since your last visit? Patient states his Depression has improved since last visit. He states he does not feel anxious.     Are you having other symptoms that might be associated with depression? No    Have you had a significant life event?  No     Are you feeling anxious or having panic attacks?   No    Do you have any concerns with your use of alcohol or other drugs? No    Social History     Tobacco Use     Smoking status: Never Smoker     Smokeless tobacco: Never Used   Substance Use Topics     Alcohol use: No     Drug use: No     Comment: h/o chem dep due to post -op analgesics 1-06     PHQ 2/10/2020 3/12/2020 4/10/2020   PHQ-9 Total Score 6 1 2   Q9: Thoughts of better off dead/self-harm past 2 weeks Several days Not at all Not at all   F/U: Thoughts of suicide or self-harm No - -   F/U: Safety concerns No - -     MINA-7 SCORE 2/10/2020 3/12/2020 4/10/2020   Total Score - - -   Total Score 3 (minimal anxiety) - -   Total Score 3 2 3           Suicide Assessment Five-step Evaluation and Treatment (SAFE-T)    Hypothyroidism Follow-up      Since last visit, patient describes the following symptoms: Weight stable, no hair loss, no skin changes, no constipation, no loose stools        Discussed pre-diabetes .    Eats large serving of ice cream.         Reviewed and updated as needed this visit by Provider  Tobacco  Allergies  Meds  Problems  Med Hx  Surg Hx  Fam Hx         Review of Systems   ROS COMP: Constitutional, HEENT, cardiovascular, pulmonary, gi and gu systems are negative, except as otherwise noted.      Objective    There were no vitals taken for this visit.  There is no height or weight on file to calculate BMI.  Physical Exam   GENERAL: healthy, alert and no distress  NEURO: mentation intact  PSYCH: mentation  "appears normal, affect normal/bright    Diagnostic Test Results:  Labs reviewed in Epic        Assessment & Plan       ICD-10-CM    1. Major depressive disorder, recurrent episode, moderate (H)  F33.1 FLUoxetine (PROZAC) 40 MG capsule   2. Pre-diabetes  R73.03    3. Hypothyroidism, unspecified type  E03.9           Patient is stable for home plan.  Continue plan.    \"Stay at Home\" cares.   Refills done.     Plan changes: none, cut ice cream dose in half- keep up with healthy eating and exercise.     Labs/Imaging:     Post-Covid (yes/no): 6-9 months.      Return in about 9 months (around 1/10/2021) for re-check office visit.    BETH Cook [facility]    "

## 2020-04-11 ASSESSMENT — ANXIETY QUESTIONNAIRES: GAD7 TOTAL SCORE: 3

## 2020-05-28 ENCOUNTER — VIRTUAL VISIT (OUTPATIENT)
Dept: FAMILY MEDICINE | Facility: OTHER | Age: 65
End: 2020-05-28

## 2020-05-28 DIAGNOSIS — Z20.822 SUSPECTED COVID-19 VIRUS INFECTION: Primary | ICD-10-CM

## 2020-05-28 PROCEDURE — 99213 OFFICE O/P EST LOW 20 MIN: CPT | Mod: 95 | Performed by: NURSE PRACTITIONER

## 2020-05-28 NOTE — PROGRESS NOTES
"Martin Armstrong is a 64 year old male who is being evaluated via a billable telephone visit.      The patient has been notified of following:     \"This telephone visit will be conducted via a call between you and your physician/provider. We have found that certain health care needs can be provided without the need for a physical exam.  This service lets us provide the care you need with a short phone conversation.  If a prescription is necessary we can send it directly to your pharmacy.  If lab work is needed we can place an order for that and you can then stop by our lab to have the test done at a later time.    Telephone visits are billed at different rates depending on your insurance coverage. During this emergency period, for some insurers they may be billed the same as an in-person visit.  Please reach out to your insurance provider with any questions.    If during the course of the call the physician/provider feels a telephone visit is not appropriate, you will not be charged for this service.\"  YesPatient has given verbal consent for Telephone visit?  Yes    What phone number would you like to be contacted at? [phone]    How would you like to obtain your AVS? Mail a copy    Subjective     Martin Armstrong is a 64 year old male who presents via phone visit today for the following health issues:    HPI  Concern - Headaches and not feeling well  Onset: couple of days now    Description:   Slight cough, headaches, muscle aches. Temp today was 99.0 today.      Progression of Symptoms:  worsening    Accompanying Signs & Symptoms:  Tired.     Previous history of similar problem:   none    Precipitating factors:   Worsened by: none    Alleviating factors:  Improved by: Excedrin helps with the headaches    PO-fairly good  Trouble breathing- no just body aches and fatigue  [facility]ovid exposure[facility]? No known.     Is quarantining at home           No high risk medical conditions.       Reviewed and updated as needed this " visit by Provider  Tobacco  Allergies  Meds  Problems  Med Hx  Surg Hx  Fam Hx         Review of Systems   Constitutional, HEENT, cardiovascular, pulmonary, gi and gu systems are negative, except as otherwise noted.       Objective   Reported vitals:  There were no vitals taken for this visit.   healthy, alert and no distress  PSYCH: Alert and oriented times 3; coherent speech, normal   rate and volume, able to articulate logical thoughts, able   to abstract reason, no tangential thoughts, no hallucinations   or delusions  His affect is normal and pleasant  RESP: No cough, no audible wheezing, able to talk in full sentences  Remainder of exam unable to be completed due to telephone visits    Diagnostic Test Results:  none         Assessment/Plan:  1. Suspected Covid-19 Virus Infection       Stable for home care. Warning/breathign signs discussed.     Covid instructions reviewed at length. Does not qualify for rapid testing.   Will call for serology testing in future.   Letter and AVS emailed to pt.   Future serology when asymptomatic.    Return in about 3 weeks (around 6/18/2020) for Lab Work.      Phone call duration:  20 minutes    BETH Cook CNP

## 2020-05-28 NOTE — PATIENT INSTRUCTIONS
Instructions for Patients  Your symptoms show that you may have coronavirus (COVID-19). This illness can cause fever, cough and trouble breathing. Many people get a mild case and get better on their own. Some people can get very sick.     Not all patients are tested for COVID-19. If you need to be tested, your care team will let you know.    How can I protect others?    Without a test, we can t know for sure that you have COVID-19. For safety, it s very important to follow these rules.    Stay home and away from others (self-isolate) until:    You ve had no fever--and no medicine that reduces fever--for 3 full days (72 hours). And      Your other symptoms have resolved (gotten better). For example, your cough or breathing has improved. And     At least 10 days have passed since your symptoms started.    During this time:    Stay in your own room (and use your own bathroom), if you can.    Stay away from others in your home. No hugging, kissing or shaking hands.    No visitors.    Don t go to work, school or anywhere else.     Clean  high touch  surfaces often (doorknobs, counters, handles, etc.). Use a household cleaning spray or wipes.    Cover your mouth and nose with a mask, tissue or washcloth to avoid spreading germs.    Wash your hands and face often. Use soap and water.    For more tips, go to https://www.cdc.gov/coronavirus/2019-ncov/downloads/10Things.pdf.    How can I take care of myself?    1. Get lots of rest. Drink extra fluids (unless a doctor has told you not to).     2. Take Tylenol (acetaminophen) for fever or pain. If you have liver or kidney problems, ask your family doctor if it's okay to take Tylenol.     Adults can take either:     650 mg (two 325 mg pills) every 4 to 6 hours, or     1,000 mg (two 500 mg pills) every 8 hours as needed.     Note: Don't take more than 3,000 mg in one day.   Acetaminophen is found in many medicines (both prescribed and over-the-counter medicines). Read all labels  to be sure you don't take too much.   For children, check the Tylenol bottle for the right dose. The dose is based on  the child's age or weight.    3. If you have other health problems (like cancer, heart failure, an organ transplant or severe kidney disease): Call your specialty clinic if you don't feel better in the next 2 days.    4. Know when to call 911: If your breathing is so bad that it keeps you from doing normal activities, call 911 or go to the emergency room. Tell them that you've been staying home and may have COVID-19.      Thank you for taking steps to prevent the spread of this virus.  o Limit your contact with others.  o Wear a simple mask to cover your cough.  o Wash your hands well and often.  o If you need medical care, go to [email or web address] or contact your health care provider.     For more about COVID-19 and caring for yourself at home, visit the CDC website at https://www.cdc.gov/coronavirus/2019-ncov/about/steps-when-sick.html.     To learn about care at [facility], please go to [email or web address].     Below are the COVID-19 hotlines at the [facility] ([facility]). Interpreters are available.     For health questions: Call 629-236-0670 or 1-799.218.7997 (7 a.m. to 7 p.m.)    For questions about schools and childcare: Call 664-116-1315 or 1-883.373.2958 (7 a.m. to 7 p.m.)

## 2020-05-28 NOTE — LETTER
May 28, 2020      Martin Armstrong  [id number] [address] [place] [address]        To Whom It May Concern:      Martin Armstrong has been evaluated and needs to remain out of work to isolate for 10 days from when symptoms started AND 72 hours after fever resolves (without fever reducing medications) AND improvement of respiratory symptoms (whichever is longer).        Sincerely,        BETH Cook CNP

## 2020-06-08 ENCOUNTER — TELEPHONE (OUTPATIENT)
Dept: FAMILY MEDICINE | Facility: CLINIC | Age: 65
End: 2020-06-08

## 2020-06-08 NOTE — TELEPHONE ENCOUNTER
Patient calling to let Nancy Sen know he had a negative covid test done and is back at work.  Just FYI.  Thank you.

## 2020-11-05 DIAGNOSIS — F33.1 MAJOR DEPRESSIVE DISORDER, RECURRENT EPISODE, MODERATE (H): ICD-10-CM

## 2020-11-06 RX ORDER — FLUOXETINE 40 MG/1
CAPSULE ORAL
Qty: 90 CAPSULE | Refills: 0 | Status: SHIPPED | OUTPATIENT
Start: 2020-11-06 | End: 2021-02-09

## 2020-11-09 ASSESSMENT — ANXIETY QUESTIONNAIRES
6. BECOMING EASILY ANNOYED OR IRRITABLE: SEVERAL DAYS
2. NOT BEING ABLE TO STOP OR CONTROL WORRYING: NOT AT ALL
3. WORRYING TOO MUCH ABOUT DIFFERENT THINGS: SEVERAL DAYS
1. FEELING NERVOUS, ANXIOUS, OR ON EDGE: NOT AT ALL
IF YOU CHECKED OFF ANY PROBLEMS ON THIS QUESTIONNAIRE, HOW DIFFICULT HAVE THESE PROBLEMS MADE IT FOR YOU TO DO YOUR WORK, TAKE CARE OF THINGS AT HOME, OR GET ALONG WITH OTHER PEOPLE: NOT DIFFICULT AT ALL
5. BEING SO RESTLESS THAT IT IS HARD TO SIT STILL: NOT AT ALL

## 2020-11-09 ASSESSMENT — PATIENT HEALTH QUESTIONNAIRE - PHQ9
SUM OF ALL RESPONSES TO PHQ QUESTIONS 1-9: 2
5. POOR APPETITE OR OVEREATING: NOT AT ALL

## 2020-11-09 NOTE — TELEPHONE ENCOUNTER
Updated PHQ-9 and scheduled patient for a physical and med check 02/11/20 with PCP.     Shiv Goldberg MA on 11/9/2020 at 3:57 PM

## 2020-12-02 ENCOUNTER — VIRTUAL VISIT (OUTPATIENT)
Dept: FAMILY MEDICINE | Facility: CLINIC | Age: 65
End: 2020-12-02
Payer: COMMERCIAL

## 2020-12-02 DIAGNOSIS — Z53.9 NO SHOW: Primary | ICD-10-CM

## 2020-12-02 NOTE — PROGRESS NOTES
"Martin Armstrong is a 65 year old male who is being evaluated via a billable telephone visit.      The patient has been notified of following:     \"This telephone visit will be conducted via a call between you and your physician/provider. We have found that certain health care needs can be provided without the need for a physical exam.  This service lets us provide the care you need with a short phone conversation.  If a prescription is necessary we can send it directly to your pharmacy.  If lab work is needed we can place an order for that and you can then stop by our lab to have the test done at a later time.    Telephone visits are billed at different rates depending on your insurance coverage. During this emergency period, for some insurers they may be billed the same as an in-person visit.  Please reach out to your insurance provider with any questions.    If during the course of the call the physician/provider feels a telephone visit is not appropriate, you will not be charged for this service.\"    Patient has given verbal consent for Telephone visit?  Yes    What phone number would you like to be contacted at? [phone]    How would you like to obtain your AVS? Carlos    Subjective     Martin Armstrong is a 65 year old male who presents via phone visit today for the following health issues:    HPI     Acute Illness- LM on pt cell at 4:20pm. Called at 4:30pm.   Acute illness concerns: covid saliva negative x 2  Onset/Duration: 2.5 weeks   Symptoms:  Fever: no  Chills/Sweats: no  Headache (location?): no  Sinus Pressure: no  Conjunctivitis:  no  Ear Pain: no  Rhinorrhea: no  Congestion: YES, started today   Sore Throat: no  Cough: YES-productive of yellow sputum with sob   Wheeze: no  Decreased Appetite: no  Nausea: no  Vomiting: no  Diarrhea: no  Dysuria/Freq.: no  Dysuria or Hematuria: no  Fatigue/Achiness: no  Sick/Strep Exposure: no  Therapies tried and outcome: None        Review of Systems      "     Objective                  Assessment/Plan:    1. NO SHOW  Pt did not respond to multiple calls for visit.   No SHOW.       Phone call duration:  0 minutes

## 2020-12-06 ENCOUNTER — HEALTH MAINTENANCE LETTER (OUTPATIENT)
Age: 65
End: 2020-12-06

## 2021-02-05 NOTE — PROGRESS NOTES
SUBJECTIVE:   CC: Martin Armstrong is an 65 year old male who presents for preventative health visit.       Patient has been advised of split billing requirements and indicates understanding: Yes  Healthy Habits:    Getting at least 3 servings of Calcium per day:  NO    Bi-annual eye exam:  Yes    Dental care twice a year:  NO    Sleep apnea or symptoms of sleep apnea:  None    Diet:  Regular (no restrictions)    Frequency of exercise:  2-3 days/week    Duration of exercise:  15-30 minutes    Taking medications regularly:  Yes    Barriers to taking medications:  None    Medication side effects:  None    PHQ-2 Total Score:      Doing well with mood. Denies adverse SA. Spending more time with wife, and plans to retire in a year.       Thyroid_ normal energy, due for updating labs.     Sees Dr. Handy for Suboxone.           Today's PHQ-2 Score:   PHQ-2 ( 1999 Pfizer) 4/10/2020   Q1: Little interest or pleasure in doing things 0   Q2: Feeling down, depressed or hopeless 0   PHQ-2 Score 0   Q1: Little interest or pleasure in doing things -   Q2: Feeling down, depressed or hopeless -   PHQ-2 Score -       Abuse: Current or Past(Physical, Sexual or Emotional)- No  Do you feel safe in your environment? Yes        Social History     Tobacco Use     Smoking status: Never Smoker     Smokeless tobacco: Never Used   Substance Use Topics     Alcohol use: No     If you drink alcohol do you typically have >3 drinks per day or >7 drinks per week? No      Last PSA:   PSA   Date Value Ref Range Status   02/10/2020 0.93 0 - 4 ug/L Final     Comment:     Assay Method:  Chemiluminescence using Siemens Vista analyzer       Reviewed orders with patient. Reviewed health maintenance and updated orders accordingly - Yes  Lab work is in process    Reviewed and updated as needed this visit by clinical staff                 Reviewed and updated as needed this visit by Provider                    Review of Systems  CONSTITUTIONAL: NEGATIVE for  fever, chills, change in weight  INTEGUMENTARY/SKIN: NEGATIVE for worrisome rashes, moles or lesions  EYES: NEGATIVE for vision changes or irritation  ENT: NEGATIVE for ear, mouth and throat problems  RESP: NEGATIVE for significant cough or SOB  CV: NEGATIVE for chest pain, palpitations or peripheral edema  GI: NEGATIVE for nausea, abdominal pain, heartburn, or change in bowel habits   male: negative for dysuria, hematuria, decreased urinary stream, erectile dysfunction, urethral discharge  MUSCULOSKELETAL: NEGATIVE for significant arthralgias or myalgia  NEURO: NEGATIVE for weakness, dizziness or paresthesias  PSYCHIATRIC: NEGATIVE for changes in mood or affect    OBJECTIVE:   There were no vitals taken for this visit.    Physical Exam  GENERAL: healthy, alert and no distress  EYES: Eyes grossly normal to inspection, PERRL and conjunctivae and sclerae normal  HENT: ear canals and TM's normal, nose and mouth without ulcers or lesions  NECK: no adenopathy, no asymmetry, masses, or scars and thyroid normal to palpation  RESP: lungs clear to auscultation - no rales, rhonchi or wheezes  CV: regular rate and rhythm, normal S1 S2, no S3 or S4, no murmur, click or rub, no peripheral edema and peripheral pulses strong  ABDOMEN: soft, nontender, no hepatosplenomegaly, no masses and bowel sounds normal   (male): no hernias  RECTAL: deferred  MS: no gross musculoskeletal defects noted, no edema  SKIN: no suspicious lesions or rashes  NEURO: Normal strength and tone, mentation intact and speech normal  PSYCH: mentation appears normal, affect normal/bright    Diagnostic Test Results:  Labs reviewed in Epic  No results found for this or any previous visit (from the past 24 hour(s)).    ASSESSMENT/PLAN:       ICD-10-CM    1. Routine general medical examination at a health care facility  Z00.00 TSH with free T4 reflex     Prostate spec antigen screen     Glucose     Lipid panel reflex to direct LDL Non-fasting     Hemoglobin  "  2. Need for prophylactic vaccination and inoculation against influenza  Z23 T4 free   3. Need for vaccination  Z23 HEPATITIS B VACCINE, ADULT, IM     CANCELED: TD PRESERV FREE, IM (7+ YRS)   4. 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 Major depressive disorder, recurrent episode, moderate (H)  F33.1 FLUoxetine (PROZAC) 40 MG capsule   6. Hypothyroidism, unspecified type  E03.9 levothyroxine (SYNTHROID/LEVOTHROID) 112 MCG tablet     TSH with free T4 reflex   7. Anemia, unspecified type  D64.9 CBC with platelets and differential       Patient has been advised of split billing requirements and indicates understanding: Yes  COUNSELING:   Reviewed preventive health counseling, as reflected in patient instructions       Regular exercise       Healthy diet/nutrition       Immunizations    See orders             Colon cancer screening       Prostate cancer screening       Anemia- needing further eval.   Mood stable, medications refilled.     Refilled Synthroid, will discuss lab on return.     Estimated body mass index is 25.49 kg/m  as calculated from the following:    Height as of 2/10/20: 1.753 m (5' 9\").    Weight as of 2/10/20: 78.3 kg (172 lb 9.6 oz).     Weight management plan: Discussed healthy diet and exercise guidelines    He reports that he has never smoked. He has never used smokeless tobacco.      Counseling Resources:  ATP IV Guidelines  Pooled Cohorts Equation Calculator  FRAX Risk Assessment  ICSI Preventive Guidelines  Dietary Guidelines for Americans, 2010  USDA's MyPlate  ASA Prophylaxis  Lung CA Screening    BETH Cook [facility]        Answers for HPI/ROS submitted by the patient on 2/11/2021   If you checked off any problems, how difficult have these problems made it for you to do your work, take care of things at home, or get along with other people?: Not difficult at all  PHQ9 TOTAL SCORE: 5  MINA 7 TOTAL SCORE: 3    "

## 2021-02-06 DIAGNOSIS — F33.1 MAJOR DEPRESSIVE DISORDER, RECURRENT EPISODE, MODERATE (H): ICD-10-CM

## 2021-02-06 DIAGNOSIS — E03.9 HYPOTHYROIDISM, UNSPECIFIED TYPE: ICD-10-CM

## 2021-02-09 RX ORDER — LEVOTHYROXINE SODIUM 112 UG/1
TABLET ORAL
Qty: 90 TABLET | Refills: 0 | Status: SHIPPED | OUTPATIENT
Start: 2021-02-09 | End: 2021-02-11

## 2021-02-09 RX ORDER — FLUOXETINE 40 MG/1
CAPSULE ORAL
Qty: 90 CAPSULE | Refills: 0 | Status: SHIPPED | OUTPATIENT
Start: 2021-02-09 | End: 2021-02-11

## 2021-02-09 NOTE — TELEPHONE ENCOUNTER
Prescription approved per [facility] Refill Protocol.  Angelic Sarabia RN, BSN  [place]  February 9, 2021

## 2021-02-11 ENCOUNTER — OFFICE VISIT (OUTPATIENT)
Dept: FAMILY MEDICINE | Facility: CLINIC | Age: 66
End: 2021-02-11
Payer: COMMERCIAL

## 2021-02-11 VITALS
RESPIRATION RATE: 18 BRPM | OXYGEN SATURATION: 97 % | WEIGHT: 174.2 LBS | BODY MASS INDEX: 25.8 KG/M2 | HEART RATE: 85 BPM | DIASTOLIC BLOOD PRESSURE: 88 MMHG | HEIGHT: 69 IN | TEMPERATURE: 98.9 F | SYSTOLIC BLOOD PRESSURE: 122 MMHG

## 2021-02-11 DIAGNOSIS — E03.9 HYPOTHYROIDISM, UNSPECIFIED TYPE: ICD-10-CM

## 2021-02-11 DIAGNOSIS — Z00.00 ROUTINE GENERAL MEDICAL EXAMINATION AT A HEALTH CARE FACILITY: Primary | ICD-10-CM

## 2021-02-11 DIAGNOSIS — F19.20 CHEMICAL DEPENDENCY (H): ICD-10-CM

## 2021-02-11 DIAGNOSIS — F33.1 MAJOR DEPRESSIVE DISORDER, RECURRENT EPISODE, MODERATE (H): ICD-10-CM

## 2021-02-11 DIAGNOSIS — Z23 NEED FOR VACCINATION: ICD-10-CM

## 2021-02-11 DIAGNOSIS — D64.9 ANEMIA, UNSPECIFIED TYPE: ICD-10-CM

## 2021-02-11 DIAGNOSIS — Z23 NEED FOR PROPHYLACTIC VACCINATION AND INOCULATION AGAINST INFLUENZA: ICD-10-CM

## 2021-02-11 LAB
BASOPHILS # BLD AUTO: 0 10E9/L (ref 0–0.2)
BASOPHILS NFR BLD AUTO: 0.2 %
CHOLEST SERPL-MCNC: 149 MG/DL
DIFFERENTIAL METHOD BLD: ABNORMAL
EOSINOPHIL # BLD AUTO: 0.4 10E9/L (ref 0–0.7)
EOSINOPHIL NFR BLD AUTO: 4.8 %
ERYTHROCYTE [DISTWIDTH] IN BLOOD BY AUTOMATED COUNT: 12.2 % (ref 10–15)
GLUCOSE SERPL-MCNC: 97 MG/DL (ref 70–99)
HCT VFR BLD AUTO: 38.3 % (ref 40–53)
HDLC SERPL-MCNC: 52 MG/DL
HGB BLD-MCNC: 12.3 G/DL (ref 13.3–17.7)
HGB BLD-MCNC: 12.3 G/DL (ref 13.3–17.7)
LDLC SERPL CALC-MCNC: 84 MG/DL
LYMPHOCYTES # BLD AUTO: 2.4 10E9/L (ref 0.8–5.3)
LYMPHOCYTES NFR BLD AUTO: 29.5 %
MCH RBC QN AUTO: 27.3 PG (ref 26.5–33)
MCHC RBC AUTO-ENTMCNC: 32.1 G/DL (ref 31.5–36.5)
MCV RBC AUTO: 85 FL (ref 78–100)
MONOCYTES # BLD AUTO: 0.7 10E9/L (ref 0–1.3)
MONOCYTES NFR BLD AUTO: 9 %
NEUTROPHILS # BLD AUTO: 4.6 10E9/L (ref 1.6–8.3)
NEUTROPHILS NFR BLD AUTO: 56.5 %
NONHDLC SERPL-MCNC: 97 MG/DL
PLATELET # BLD AUTO: 281 10E9/L (ref 150–450)
PSA SERPL-ACNC: 0.22 UG/L (ref 0–4)
RBC # BLD AUTO: 4.5 10E12/L (ref 4.4–5.9)
T4 FREE SERPL-MCNC: 1.1 NG/DL (ref 0.76–1.46)
TRIGL SERPL-MCNC: 64 MG/DL
TSH SERPL DL<=0.005 MIU/L-ACNC: 5.32 MU/L (ref 0.4–4)
WBC # BLD AUTO: 8.1 10E9/L (ref 4–11)

## 2021-02-11 PROCEDURE — 36415 COLL VENOUS BLD VENIPUNCTURE: CPT | Performed by: NURSE PRACTITIONER

## 2021-02-11 PROCEDURE — 84439 ASSAY OF FREE THYROXINE: CPT | Performed by: NURSE PRACTITIONER

## 2021-02-11 PROCEDURE — G0103 PSA SCREENING: HCPCS | Performed by: NURSE PRACTITIONER

## 2021-02-11 PROCEDURE — 90715 TDAP VACCINE 7 YRS/> IM: CPT | Performed by: NURSE PRACTITIONER

## 2021-02-11 PROCEDURE — 80061 LIPID PANEL: CPT | Performed by: NURSE PRACTITIONER

## 2021-02-11 PROCEDURE — 90686 IIV4 VACC NO PRSV 0.5 ML IM: CPT | Performed by: NURSE PRACTITIONER

## 2021-02-11 PROCEDURE — 85018 HEMOGLOBIN: CPT | Performed by: NURSE PRACTITIONER

## 2021-02-11 PROCEDURE — 90472 IMMUNIZATION ADMIN EACH ADD: CPT | Performed by: NURSE PRACTITIONER

## 2021-02-11 PROCEDURE — 99396 PREV VISIT EST AGE 40-64: CPT | Mod: 25 | Performed by: NURSE PRACTITIONER

## 2021-02-11 PROCEDURE — 90750 HZV VACC RECOMBINANT IM: CPT | Performed by: NURSE PRACTITIONER

## 2021-02-11 PROCEDURE — 82947 ASSAY GLUCOSE BLOOD QUANT: CPT | Performed by: NURSE PRACTITIONER

## 2021-02-11 PROCEDURE — 84443 ASSAY THYROID STIM HORMONE: CPT | Performed by: NURSE PRACTITIONER

## 2021-02-11 PROCEDURE — 99214 OFFICE O/P EST MOD 30 MIN: CPT | Mod: 25 | Performed by: NURSE PRACTITIONER

## 2021-02-11 PROCEDURE — 90746 HEPB VACCINE 3 DOSE ADULT IM: CPT | Performed by: NURSE PRACTITIONER

## 2021-02-11 PROCEDURE — 85025 COMPLETE CBC W/AUTO DIFF WBC: CPT | Performed by: NURSE PRACTITIONER

## 2021-02-11 PROCEDURE — 90471 IMMUNIZATION ADMIN: CPT | Performed by: NURSE PRACTITIONER

## 2021-02-11 RX ORDER — LEVOTHYROXINE SODIUM 112 UG/1
112 TABLET ORAL DAILY
Qty: 90 TABLET | Refills: 3 | Status: SHIPPED | OUTPATIENT
Start: 2021-02-11 | End: 2022-04-28

## 2021-02-11 RX ORDER — FLUOXETINE 40 MG/1
CAPSULE ORAL
Qty: 90 CAPSULE | Refills: 1 | Status: SHIPPED | OUTPATIENT
Start: 2021-02-11 | End: 2021-05-07

## 2021-02-11 ASSESSMENT — ANXIETY QUESTIONNAIRES
2. NOT BEING ABLE TO STOP OR CONTROL WORRYING: NOT AT ALL
4. TROUBLE RELAXING: NOT AT ALL
1. FEELING NERVOUS, ANXIOUS, OR ON EDGE: SEVERAL DAYS
GAD7 TOTAL SCORE: 3
GAD7 TOTAL SCORE: 3
3. WORRYING TOO MUCH ABOUT DIFFERENT THINGS: SEVERAL DAYS
7. FEELING AFRAID AS IF SOMETHING AWFUL MIGHT HAPPEN: NOT AT ALL
5. BEING SO RESTLESS THAT IT IS HARD TO SIT STILL: NOT AT ALL
GAD7 TOTAL SCORE: 3
6. BECOMING EASILY ANNOYED OR IRRITABLE: SEVERAL DAYS
7. FEELING AFRAID AS IF SOMETHING AWFUL MIGHT HAPPEN: NOT AT ALL

## 2021-02-11 ASSESSMENT — MIFFLIN-ST. JEOR: SCORE: 1570.55

## 2021-02-11 NOTE — NURSING NOTE
Prior to immunization administration, verified patients identity using patient s name and date of birth. Please see Immunization Activity for additional information.     Screening Questionnaire for Adult Immunization    Are you sick today?   No   Do you have allergies to medications, food, a vaccine component or latex?   No   Have you ever had a serious reaction after receiving a vaccination?   No   Do you have a long-term health problem with heart, lung, kidney, or metabolic disease (e.g., diabetes), asthma, a blood disorder, no spleen, complement component deficiency, a cochlear implant, or a spinal fluid leak?  Are you on long-term aspirin therapy?   No   Do you have cancer, leukemia, HIV/AIDS, or any other immune system problem?   No   Do you have a parent, brother, or sister with an immune system problem?   No   In the past 3 months, have you taken medications that affect  your immune system, such as prednisone, other steroids, or anticancer drugs; drugs for the treatment of rheumatoid arthritis, Crohn s disease, or psoriasis; or have you had radiation treatments?   No   Have you had a seizure, or a brain or other nervous system problem?   No   During the past year, have you received a transfusion of blood or blood    products, or been given immune (gamma) globulin or antiviral drug?   No   For women: Are you pregnant or is there a chance you could become       pregnant during the next month?   No   Have you received any vaccinations in the past 4 weeks?   No     Immunization questionnaire answers were all negative.         Patient instructed to remain in clinic for 15 minutes afterwards, and to report any adverse reaction to me immediately.       Screening performed by Lashanda Price on 2/11/2021 at 1:51 PM.

## 2021-02-11 NOTE — LETTER
February 16, 2021      Martin MEDINA Armstrong  [address] [facility] [address]        Dear ,    We are writing to inform you of your test results.    I need to run more labs to look at why your hemoglobin is down. I have left two phone messages. I would like this done soon.  Please re-check labs in the next week.      Also, your thyroid is just a bit off and would like to re-check that again in 2 months.   Other labs in good range.     Resulted Orders   TSH with free T4 reflex   Result Value Ref Range    TSH 5.32 (H) 0.40 - 4.00 mU/L   Prostate spec antigen screen   Result Value Ref Range    PSA 0.22 0 - 4 ug/L      Comment:      Assay Method:  Chemiluminescence using Siemens Vista analyzer   Glucose   Result Value Ref Range    Glucose 97 70 - 99 mg/dL   Lipid panel reflex to direct LDL Non-fasting   Result Value Ref Range    Cholesterol 149 <200 mg/dL    Triglycerides 64 <150 mg/dL    HDL Cholesterol 52 >39 mg/dL    LDL Cholesterol Calculated 84 <100 mg/dL      Comment:      Desirable:       <100 mg/dl    Non HDL Cholesterol 97 <130 mg/dL   Hemoglobin   Result Value Ref Range    Hemoglobin 12.3 (L) 13.3 - 17.7 g/dL   CBC with platelets and differential   Result Value Ref Range    WBC 8.1 4.0 - 11.0 10e9/L    RBC Count 4.50 4.4 - 5.9 10e12/L    Hemoglobin 12.3 (L) 13.3 - 17.7 g/dL    Hematocrit 38.3 (L) 40.0 - 53.0 %    MCV 85 78 - 100 fl    MCH 27.3 26.5 - 33.0 pg    MCHC 32.1 31.5 - 36.5 g/dL    RDW 12.2 10.0 - 15.0 %    Platelet Count 281 150 - 450 10e9/L    % Neutrophils 56.5 %    % Lymphocytes 29.5 %    % Monocytes 9.0 %    % Eosinophils 4.8 %    % Basophils 0.2 %    Absolute Neutrophil 4.6 1.6 - 8.3 10e9/L    Absolute Lymphocytes 2.4 0.8 - 5.3 10e9/L    Absolute Monocytes 0.7 0.0 - 1.3 10e9/L    Absolute Eosinophils 0.4 0.0 - 0.7 10e9/L    Absolute Basophils 0.0 0.0 - 0.2 10e9/L    Diff Method Automated Method    T4 free   Result Value Ref Range    T4 Free 1.10 0.76 - 1.46 ng/dL       If you have  any questions or concerns, please call the clinic at the number listed above.       Sincerely,      BETH Treadwell CNP

## 2021-02-12 ENCOUNTER — TELEPHONE (OUTPATIENT)
Dept: FAMILY MEDICINE | Facility: CLINIC | Age: 66
End: 2021-02-12

## 2021-02-12 DIAGNOSIS — E03.9 HYPOTHYROIDISM, UNSPECIFIED TYPE: Primary | ICD-10-CM

## 2021-02-12 DIAGNOSIS — D64.9 ANEMIA, UNSPECIFIED TYPE: ICD-10-CM

## 2021-02-12 ASSESSMENT — PATIENT HEALTH QUESTIONNAIRE - PHQ9: SUM OF ALL RESPONSES TO PHQ QUESTIONS 1-9: 5

## 2021-02-12 ASSESSMENT — ANXIETY QUESTIONNAIRES: GAD7 TOTAL SCORE: 3

## 2021-02-12 NOTE — TELEPHONE ENCOUNTER
LM- would like to talk about anemia a bit more. Will run more tests.   Also TSH- mild elevation. Want to -re-check this in a couple months, vs adjusting dose.     Pt. To call back on Monday. BETH Cook CNP

## 2021-02-15 NOTE — TELEPHONE ENCOUNTER
LM with below. Re-check thyroid in 2 months. Schedule lab only for anemia studies.   BETH Cook CNP

## 2021-02-19 DIAGNOSIS — E03.9 HYPOTHYROIDISM, UNSPECIFIED TYPE: ICD-10-CM

## 2021-02-19 DIAGNOSIS — D64.9 ANEMIA, UNSPECIFIED TYPE: ICD-10-CM

## 2021-02-19 LAB
BASOPHILS # BLD AUTO: 0 10E9/L (ref 0–0.2)
BASOPHILS NFR BLD AUTO: 0.3 %
DIFFERENTIAL METHOD BLD: ABNORMAL
EOSINOPHIL # BLD AUTO: 0.4 10E9/L (ref 0–0.7)
EOSINOPHIL NFR BLD AUTO: 5.4 %
ERYTHROCYTE [DISTWIDTH] IN BLOOD BY AUTOMATED COUNT: 12.2 % (ref 10–15)
FERRITIN SERPL-MCNC: 10 NG/ML (ref 26–388)
HCT VFR BLD AUTO: 36.5 % (ref 40–53)
HGB BLD-MCNC: 11.8 G/DL (ref 13.3–17.7)
IRON SATN MFR SERPL: 10 % (ref 15–46)
IRON SERPL-MCNC: 45 UG/DL (ref 35–180)
LYMPHOCYTES # BLD AUTO: 2.4 10E9/L (ref 0.8–5.3)
LYMPHOCYTES NFR BLD AUTO: 29.6 %
MCH RBC QN AUTO: 27.5 PG (ref 26.5–33)
MCHC RBC AUTO-ENTMCNC: 32.3 G/DL (ref 31.5–36.5)
MCV RBC AUTO: 85 FL (ref 78–100)
MONOCYTES # BLD AUTO: 0.8 10E9/L (ref 0–1.3)
MONOCYTES NFR BLD AUTO: 9.8 %
NEUTROPHILS # BLD AUTO: 4.4 10E9/L (ref 1.6–8.3)
NEUTROPHILS NFR BLD AUTO: 54.9 %
PLATELET # BLD AUTO: 242 10E9/L (ref 150–450)
RBC # BLD AUTO: 4.29 10E12/L (ref 4.4–5.9)
RETICS # AUTO: 53.9 10E9/L (ref 25–95)
RETICS/RBC NFR AUTO: 1.2 % (ref 0.5–2)
TIBC SERPL-MCNC: 466 UG/DL (ref 240–430)
TSH SERPL DL<=0.005 MIU/L-ACNC: 3.14 MU/L (ref 0.4–4)
VIT B12 SERPL-MCNC: 762 PG/ML (ref 193–986)
WBC # BLD AUTO: 8 10E9/L (ref 4–11)

## 2021-02-19 PROCEDURE — 84443 ASSAY THYROID STIM HORMONE: CPT | Performed by: NURSE PRACTITIONER

## 2021-02-19 PROCEDURE — 85045 AUTOMATED RETICULOCYTE COUNT: CPT | Performed by: NURSE PRACTITIONER

## 2021-02-19 PROCEDURE — 99N1109 PR STATISTIC MORPHOLOGY W/INTERP HISTOLOGY TC 85060: Performed by: NURSE PRACTITIONER

## 2021-02-19 PROCEDURE — 85025 COMPLETE CBC W/AUTO DIFF WBC: CPT | Performed by: NURSE PRACTITIONER

## 2021-02-19 PROCEDURE — 83540 ASSAY OF IRON: CPT | Performed by: NURSE PRACTITIONER

## 2021-02-19 PROCEDURE — 83550 IRON BINDING TEST: CPT | Performed by: NURSE PRACTITIONER

## 2021-02-19 PROCEDURE — 82728 ASSAY OF FERRITIN: CPT | Performed by: NURSE PRACTITIONER

## 2021-02-19 PROCEDURE — 36415 COLL VENOUS BLD VENIPUNCTURE: CPT | Performed by: NURSE PRACTITIONER

## 2021-02-19 PROCEDURE — 82607 VITAMIN B-12: CPT | Performed by: NURSE PRACTITIONER

## 2021-02-23 LAB — COPATH REPORT: NORMAL

## 2021-05-06 ENCOUNTER — TELEPHONE (OUTPATIENT)
Dept: FAMILY MEDICINE | Facility: CLINIC | Age: 66
End: 2021-05-06

## 2021-05-06 DIAGNOSIS — F33.1 MAJOR DEPRESSIVE DISORDER, RECURRENT EPISODE, MODERATE (H): ICD-10-CM

## 2021-05-06 NOTE — LETTER
May 11, 2021      Martin Armstrong  [address] [facility] [address]              Dear Martin,    Please fill out and return. We like to check on your mood in between refills, you are currently due for this questionnaire.       Sincerely,      Nancy Sen CNP

## 2021-05-07 RX ORDER — FLUOXETINE 40 MG/1
CAPSULE ORAL
Qty: 90 CAPSULE | Refills: 0 | Status: SHIPPED | OUTPATIENT
Start: 2021-05-07 | End: 2021-08-05

## 2021-05-07 NOTE — TELEPHONE ENCOUNTER
Pending Prescriptions:                       Disp   Refills    FLUoxetine (PROZAC) 40 MG capsule [Pharma*90 cap*0            Sig: Take 1 capsule by mouth once daily    Medication is being filled for 1 time baljit refill only due to:  Patient is due for phq9. Please call to complete.    Thank you!    Angelic Sarabia RN, BSN  [place] [place]  May 7, 2021

## 2021-07-09 ENCOUNTER — TELEPHONE (OUTPATIENT)
Dept: FAMILY MEDICINE | Facility: CLINIC | Age: 66
End: 2021-07-09

## 2021-07-09 NOTE — PROGRESS NOTES
Assessment & Plan     Palpitations  Discussed EKG occasionally having PAC will recheck anemia status since he did not start iron supplement. Also discussed ZIO patch recommending due to symptoms of SOB, palpations, past echo reviewed normal. Will also test thyroid to confirm this is stable he stating he take levothyroxine daily  - EKG 12-lead complete w/read - Clinics  - Ferritin; Future  - Iron and iron binding capacity; Future  - TSH with free T4 reflex; Future  - CBC with platelets and differential; Future  - Basic metabolic panel  (Ca, Cl, CO2, Creat, Gluc, K, Na, BUN); Future  - Leadless EKG Monitor 3 to 7 Days; Future  - Ferritin  - Iron and iron binding capacity  - TSH with free T4 reflex  - CBC with platelets and differential  - Basic metabolic panel  (Ca, Cl, CO2, Creat, Gluc, K, Na, BUN)    SOB (shortness of breath)    - Leadless EKG Monitor 3 to 7 Days; Future    Hypothyroidism, unspecified type    - TSH with free T4 reflex; Future  - TSH with free T4 reflex    Anemia, unspecified type    - Ferritin; Future  - Iron and iron binding capacity; Future  - CBC with platelets and differential; Future  - Reticulocyte count; Future  - ferrous sulfate (FE TABS) 325 (65 Fe) MG EC tablet; Take 1 tablet (325 mg) by mouth 3 times daily  - Ferritin  - Iron and iron binding capacity  - CBC with platelets and differential  - Reticulocyte count    Abnormal electrocardiogram    - Leadless EKG Monitor 3 to 7 Days; Future    Did not fill out a form from his dentist recommend the labs and zio prior to dental procedure.     Patient Instructions   I will call you with your lab results and any further treatment as indicated.             BETH Marroquin [facility] AZIZA Salazar is a 65 year old who presents for the following health issues     Shortness of Breath  This is a new problem. The current episode started more than 1 month ago. The problem occurs intermittently. The problem  "has been unchanged. Pertinent negatives include no abdominal pain, chest pain, fever, headaches, neck pain, rash, sore throat, swollen glands or vomiting. Nothing aggravates the symptoms.      Patient states he will have SOB with heavy exertion he is able to walk and jog without this symptoms.   He is denies chest pain.   States he was at dentist they were not able to obtain a blood pressure and the dentis stated that he was having an irregular heart rate.     History of anemia did not start iron supplement.   History of thyroid disorder       Answers for HPI/ROS submitted by the patient on 7/12/2021  If you checked off any problems, how difficult have these problems made it for you to do your work, take care of things at home, or get along with other people?: Not difficult at all  PHQ9 TOTAL SCORE: 5  MINA 7 TOTAL SCORE: 5  Episode duration: 30 seconds  claudication: No  coryza: No  hemoptysis: No  leg pain: No  leg swelling: No  orthopnea: No  PND: No  sputum production: No  syncope: No      Review of Systems   Constitutional: Negative for fever.   HENT: Negative for ear pain, rhinorrhea and sore throat.    Respiratory: Positive for shortness of breath. Negative for wheezing.    Cardiovascular: Negative for chest pain.   Gastrointestinal: Negative for abdominal pain and vomiting.   Musculoskeletal: Negative for neck pain.   Skin: Negative for rash.   Neurological: Negative for headaches.      Constitutional, HEENT, cardiovascular, pulmonary, GI, , musculoskeletal, neuro, skin, endocrine and psych systems are negative, except as otherwise noted.      Objective    /64   Pulse 75   Temp 97.9  F (36.6  C) (Temporal)   Resp 14   Ht 1.753 m (5' 9\")   Wt 79.8 kg (176 lb)   SpO2 99%   BMI 25.99 kg/m    Body mass index is 25.99 kg/m .  Physical Exam   GENERAL: healthy, alert and no distress  EYES: Eyes grossly normal to inspection, PERRL and conjunctivae and sclerae normal  HENT: ear canals and TM's normal, " nose and mouth without ulcers or lesions  NECK: no adenopathy, no asymmetry, masses, or scars and thyroid normal to palpation  RESP: lungs clear to auscultation - no rales, rhonchi or wheezes  CV: occasional premature beats, normal S1 S2, no S3 or S4, no murmur, click or rub, peripheral pulses strong and no peripheral edema  ABDOMEN: soft, nontender, no hepatosplenomegaly, no masses and bowel sounds normal  MS: no gross musculoskeletal defects noted, no edema  SKIN: no suspicious lesions or rashes  NEURO: Normal strength and tone, mentation intact and speech normal  PSYCH: mentation appears normal, affect normal/bright

## 2021-07-09 NOTE — TELEPHONE ENCOUNTER
"Patient is on schedule on Monday to see Lazara Orosco at 8:40am for \"heart skipping.\"   RNs, please triage if appropriate.  "

## 2021-07-09 NOTE — TELEPHONE ENCOUNTER
LM for the patient to return call to the clinic. Please transfer to any triage RN. Will send message through "[employer].     Angelic Sarabia RN, BSN  [place] [place]  July 9, 2021

## 2021-07-12 ENCOUNTER — OFFICE VISIT (OUTPATIENT)
Dept: FAMILY MEDICINE | Facility: CLINIC | Age: 66
End: 2021-07-12
Payer: COMMERCIAL

## 2021-07-12 VITALS
SYSTOLIC BLOOD PRESSURE: 118 MMHG | OXYGEN SATURATION: 99 % | RESPIRATION RATE: 14 BRPM | BODY MASS INDEX: 26.07 KG/M2 | HEART RATE: 75 BPM | WEIGHT: 176 LBS | HEIGHT: 69 IN | TEMPERATURE: 97.9 F | DIASTOLIC BLOOD PRESSURE: 64 MMHG

## 2021-07-12 DIAGNOSIS — E03.9 HYPOTHYROIDISM, UNSPECIFIED TYPE: ICD-10-CM

## 2021-07-12 DIAGNOSIS — R06.02 SOB (SHORTNESS OF BREATH): ICD-10-CM

## 2021-07-12 DIAGNOSIS — R00.2 PALPITATIONS: Primary | ICD-10-CM

## 2021-07-12 DIAGNOSIS — D64.9 ANEMIA, UNSPECIFIED TYPE: ICD-10-CM

## 2021-07-12 DIAGNOSIS — R94.31 ABNORMAL ELECTROCARDIOGRAM: ICD-10-CM

## 2021-07-12 LAB
ANION GAP SERPL CALCULATED.3IONS-SCNC: 4 MMOL/L (ref 3–14)
BASOPHILS # BLD AUTO: 0 10E3/UL (ref 0–0.2)
BASOPHILS NFR BLD AUTO: 0 %
BUN SERPL-MCNC: 23 MG/DL (ref 7–30)
CALCIUM SERPL-MCNC: 9.2 MG/DL (ref 8.5–10.1)
CHLORIDE BLD-SCNC: 109 MMOL/L (ref 94–109)
CO2 SERPL-SCNC: 28 MMOL/L (ref 20–32)
CREAT SERPL-MCNC: 0.95 MG/DL (ref 0.66–1.25)
EOSINOPHIL # BLD AUTO: 0.4 10E3/UL (ref 0–0.7)
EOSINOPHIL NFR BLD AUTO: 5 %
ERYTHROCYTE [DISTWIDTH] IN BLOOD BY AUTOMATED COUNT: 13.4 % (ref 10–15)
FERRITIN SERPL-MCNC: 10 NG/ML (ref 26–388)
GFR SERPL CREATININE-BSD FRML MDRD: 84 ML/MIN/1.73M2
GLUCOSE BLD-MCNC: 124 MG/DL (ref 70–99)
HCT VFR BLD AUTO: 36.6 % (ref 40–53)
HGB BLD-MCNC: 12.1 G/DL (ref 13.3–17.7)
IRON SATN MFR SERPL: 11 % (ref 15–46)
IRON SERPL-MCNC: 51 UG/DL (ref 35–180)
LYMPHOCYTES # BLD AUTO: 2 10E3/UL (ref 0.8–5.3)
LYMPHOCYTES NFR BLD AUTO: 27 %
MCH RBC QN AUTO: 28.1 PG (ref 26.5–33)
MCHC RBC AUTO-ENTMCNC: 33.1 G/DL (ref 31.5–36.5)
MCV RBC AUTO: 85 FL (ref 78–100)
MONOCYTES # BLD AUTO: 0.8 10E3/UL (ref 0–1.3)
MONOCYTES NFR BLD AUTO: 11 %
NEUTROPHILS # BLD AUTO: 4.2 10E3/UL (ref 1.6–8.3)
NEUTROPHILS NFR BLD AUTO: 57 %
PLATELET # BLD AUTO: 218 10E3/UL (ref 150–450)
POTASSIUM BLD-SCNC: 4.4 MMOL/L (ref 3.4–5.3)
RBC # BLD AUTO: 4.3 10E6/UL (ref 4.4–5.9)
RETICS # AUTO: 0.08 10E6/UL (ref 0.03–0.1)
RETICS/RBC NFR AUTO: 1.8 % (ref 0.5–2)
SODIUM SERPL-SCNC: 141 MMOL/L (ref 133–144)
TIBC SERPL-MCNC: 466 UG/DL (ref 240–430)
TSH SERPL DL<=0.005 MIU/L-ACNC: 2.54 MU/L (ref 0.4–4)
WBC # BLD AUTO: 7.3 10E3/UL (ref 4–11)

## 2021-07-12 PROCEDURE — 93000 ELECTROCARDIOGRAM COMPLETE: CPT | Performed by: NURSE PRACTITIONER

## 2021-07-12 PROCEDURE — 82728 ASSAY OF FERRITIN: CPT | Performed by: NURSE PRACTITIONER

## 2021-07-12 PROCEDURE — 36415 COLL VENOUS BLD VENIPUNCTURE: CPT | Performed by: NURSE PRACTITIONER

## 2021-07-12 PROCEDURE — 99214 OFFICE O/P EST MOD 30 MIN: CPT | Performed by: NURSE PRACTITIONER

## 2021-07-12 PROCEDURE — 80048 BASIC METABOLIC PNL TOTAL CA: CPT | Performed by: NURSE PRACTITIONER

## 2021-07-12 PROCEDURE — 85025 COMPLETE CBC W/AUTO DIFF WBC: CPT | Performed by: NURSE PRACTITIONER

## 2021-07-12 PROCEDURE — 85045 AUTOMATED RETICULOCYTE COUNT: CPT | Performed by: NURSE PRACTITIONER

## 2021-07-12 PROCEDURE — 84443 ASSAY THYROID STIM HORMONE: CPT | Performed by: NURSE PRACTITIONER

## 2021-07-12 PROCEDURE — 83550 IRON BINDING TEST: CPT | Performed by: NURSE PRACTITIONER

## 2021-07-12 RX ORDER — FERROUS SULFATE 325(65) MG
325 TABLET, DELAYED RELEASE (ENTERIC COATED) ORAL 3 TIMES DAILY
Qty: 270 TABLET | Refills: 2 | Status: SHIPPED | OUTPATIENT
Start: 2021-07-12 | End: 2022-04-08

## 2021-07-12 ASSESSMENT — ENCOUNTER SYMPTOMS
RHINORRHEA: 0
PND: 0
SHORTNESS OF BREATH: 1
NECK PAIN: 0
ABDOMINAL PAIN: 0
CLAUDICATION: 0
SWOLLEN GLANDS: 0
VOMITING: 0
WHEEZING: 0
FEVER: 0
SYNCOPE: 0
LEG PAIN: 0
SORE THROAT: 0
HEMOPTYSIS: 0
SPUTUM PRODUCTION: 0
HEADACHES: 0
ORTHOPNEA: 0

## 2021-07-12 ASSESSMENT — PATIENT HEALTH QUESTIONNAIRE - PHQ9
SUM OF ALL RESPONSES TO PHQ QUESTIONS 1-9: 5
10. IF YOU CHECKED OFF ANY PROBLEMS, HOW DIFFICULT HAVE THESE PROBLEMS MADE IT FOR YOU TO DO YOUR WORK, TAKE CARE OF THINGS AT HOME, OR GET ALONG WITH OTHER PEOPLE: NOT DIFFICULT AT ALL
SUM OF ALL RESPONSES TO PHQ QUESTIONS 1-9: 5

## 2021-07-12 ASSESSMENT — ANXIETY QUESTIONNAIRES
GAD7 TOTAL SCORE: 5
GAD7 TOTAL SCORE: 5
2. NOT BEING ABLE TO STOP OR CONTROL WORRYING: SEVERAL DAYS
1. FEELING NERVOUS, ANXIOUS, OR ON EDGE: SEVERAL DAYS
7. FEELING AFRAID AS IF SOMETHING AWFUL MIGHT HAPPEN: NOT AT ALL
3. WORRYING TOO MUCH ABOUT DIFFERENT THINGS: SEVERAL DAYS
4. TROUBLE RELAXING: SEVERAL DAYS
GAD7 TOTAL SCORE: 5
6. BECOMING EASILY ANNOYED OR IRRITABLE: SEVERAL DAYS
5. BEING SO RESTLESS THAT IT IS HARD TO SIT STILL: NOT AT ALL
7. FEELING AFRAID AS IF SOMETHING AWFUL MIGHT HAPPEN: NOT AT ALL
8. IF YOU CHECKED OFF ANY PROBLEMS, HOW DIFFICULT HAVE THESE MADE IT FOR YOU TO DO YOUR WORK, TAKE CARE OF THINGS AT HOME, OR GET ALONG WITH OTHER PEOPLE?: NOT DIFFICULT AT ALL

## 2021-07-12 ASSESSMENT — MIFFLIN-ST. JEOR: SCORE: 1578.71

## 2021-07-12 NOTE — RESULT ENCOUNTER NOTE
Results discussed with patient in clinic. States understanding of these results.    Lazara Orosco CNP

## 2021-07-12 NOTE — TELEPHONE ENCOUNTER
Patient was seen in clinic already.   Angelic Sarabia RN, BSN  [place]  July 12, 2021

## 2021-07-13 ASSESSMENT — PATIENT HEALTH QUESTIONNAIRE - PHQ9: SUM OF ALL RESPONSES TO PHQ QUESTIONS 1-9: 5

## 2021-07-13 ASSESSMENT — ANXIETY QUESTIONNAIRES: GAD7 TOTAL SCORE: 5

## 2021-07-13 NOTE — RESULT ENCOUNTER NOTE
Please advise Martin Armstrong, LOYDA 1955, that his lab results were continue anemia please take supplements as discussed. Thyroid hormone level is normal.   [phone] (home)   Thank you  Lazara Orosco CNP

## 2021-07-15 ENCOUNTER — ANCILLARY PROCEDURE (OUTPATIENT)
Dept: CARDIOLOGY | Facility: CLINIC | Age: 66
End: 2021-07-15
Attending: NURSE PRACTITIONER
Payer: COMMERCIAL

## 2021-07-15 DIAGNOSIS — R06.02 SOB (SHORTNESS OF BREATH): ICD-10-CM

## 2021-07-15 DIAGNOSIS — R94.31 ABNORMAL ELECTROCARDIOGRAM: ICD-10-CM

## 2021-07-15 DIAGNOSIS — R00.2 PALPITATIONS: ICD-10-CM

## 2021-07-15 PROCEDURE — 93241 XTRNL ECG REC>48HR<7D: CPT | Performed by: INTERNAL MEDICINE

## 2021-07-15 NOTE — PATIENT INSTRUCTIONS
Patient has been prescribed a ZioPatch holter for 7 days.  Patient was instructed regarding the indication, function, care and prompt return of the ZioPatch holter monitor. The monitor, with S/N [id number],  was placed on the patient with instructions regarding care of the skin, electrodes, and monitor, as well as documentation in the patient diary. Patient demonstrated understanding of this information and agreed to call [employer] with further questions or concerns.

## 2021-08-05 DIAGNOSIS — F33.1 MAJOR DEPRESSIVE DISORDER, RECURRENT EPISODE, MODERATE (H): ICD-10-CM

## 2021-08-05 RX ORDER — FLUOXETINE 40 MG/1
CAPSULE ORAL
Qty: 90 CAPSULE | Refills: 0 | Status: SHIPPED | OUTPATIENT
Start: 2021-08-05 | End: 2021-11-04

## 2021-08-05 NOTE — TELEPHONE ENCOUNTER
Pending Prescriptions:                       Disp   Refills    FLUoxetine (PROZAC) 40 MG capsule [Pharmac*90 cap*0        Sig: Take 1 capsule by mouth once daily    Routing refill request to provider for review/approval because:  Labs out of range:  PHQ9  PHQ-9 score:    PHQ 7/12/2021   PHQ-9 Total Score 5   Q9: Thoughts of better off dead/self-harm past 2 weeks Not at all   F/U: Thoughts of suicide or self-harm -   F/U: Safety concerns -

## 2021-08-12 ENCOUNTER — TELEPHONE (OUTPATIENT)
Dept: FAMILY MEDICINE | Facility: CLINIC | Age: 66
End: 2021-08-12

## 2021-08-12 NOTE — TELEPHONE ENCOUNTER
Patient calling stating he had a ziopatch done at [place] back in Mid July and have never received results  Saw [facility] but family provider is [facility]

## 2021-08-13 ENCOUNTER — TELEPHONE (OUTPATIENT)
Dept: FAMILY MEDICINE | Facility: CLINIC | Age: 66
End: 2021-08-13

## 2021-08-13 NOTE — TELEPHONE ENCOUNTER
We do not have results yet they are still in process. Will reach out when we have them     Thank you  Lazara Orosco CNP

## 2021-09-25 ENCOUNTER — HEALTH MAINTENANCE LETTER (OUTPATIENT)
Age: 66
End: 2021-09-25

## 2021-12-06 NOTE — PROGRESS NOTES
"Martin is a 66 year old who is being evaluated via a billable video visit.      How would you like to obtain your AVS? MyChart  If the video visit is dropped, the invitation should be resent by: Text to cell phone: [phone]  Will anyone else be joining your video visit? No      Video Start Time: 0839    Assessment & Plan     Major depressive disorder, recurrent episode, moderate (H)  Stable, continue healthy plan.   - FLUoxetine (PROZAC) 40 MG capsule; Take 1 capsule by mouth once daily    Iron deficiency  Constipation on TID iron, going down to every day dosing, long term. Labs in Feb. Pt. agrees  - **CBC with platelets differential FUTURE 2mo; Future  - **Iron and iron binding capacity FUTURE 2mo; Future  - **Ferritin FUTURE 2mo; Future    History of opioid abuse (H)  Stable, sees Dr. Beckford- outside RX for suboxzone, may need new provider if retiring this year, referral placed.   Pt. Is trying to wean off meds.   - MENTAL HEALTH REFERRAL  - Adult; Addiction Medicine Provider; Addiction Medicine Evaluation & Treatment; Addiction Med Consult, Eval & Treat - [place] [phone]; Opioids; Other: Enter in Comments; Future             BMI:   Estimated body mass index is 25.99 kg/m  as calculated from the following:    Height as of 7/12/21: 1.753 m (5' 9\").    Weight as of 7/12/21: 79.8 kg (176 lb).       MEDICATIONS:  Continue current medications without change    Return in about 2 months (around 2/7/2022) for Lab Work.    BETH Cook CNP  [facility] AZIZA Salazar is a 66 year old who presents for the following health issues     History of Present Illness       He eats 0-1 servings of fruits and vegetables daily.He consumes 2 sweetened beverage(s) daily.He exercises with enough effort to increase his heart rate 20 to 29 minutes per day.  He exercises with enough effort to increase his heart rate 4 days per week.   He is taking medications regularly.       Depression and " "Anxiety Follow-Up    How are you doing with your depression since your last visit? No change    How are you doing with your anxiety since your last visit?  No change    Are you having other symptoms that might be associated with depression or anxiety? No    Have you had a significant life event? OTHER: \"theres a few\" but did not want to tell me.      Do you have any concerns with your use of alcohol or other drugs? No    Social History     Tobacco Use     Smoking status: Never Smoker     Smokeless tobacco: Never Used   Vaping Use     Vaping Use: Never used   Substance Use Topics     Alcohol use: No     Drug use: No     Comment: h/o chem dep due to post -op analgesics 1-06     PHQ 2/11/2021 7/12/2021 12/7/2021   PHQ-9 Total Score 5 5 3   Q9: Thoughts of better off dead/self-harm past 2 weeks Not at all Not at all Not at all   F/U: Thoughts of suicide or self-harm - - -   F/U: Safety concerns - - -     MINA-7 SCORE 2/11/2021 7/12/2021 12/7/2021   Total Score - - -   Total Score 3 (minimal anxiety) 5 (mild anxiety) -   Total Score 3 5 2     Last PHQ-9 12/7/2021   1.  Little interest or pleasure in doing things 1   2.  Feeling down, depressed, or hopeless 1   3.  Trouble falling or staying asleep, or sleeping too much 0   4.  Feeling tired or having little energy 0   5.  Poor appetite or overeating 0   6.  Feeling bad about yourself 0   7.  Trouble concentrating 1   8.  Moving slowly or restless 0   Q9: Thoughts of better off dead/self-harm past 2 weeks 0   PHQ-9 Total Score 3   Difficulty at work, home, or with people Somewhat difficult   In the past two weeks have you had thoughts of suicide or self harm? -   Do you have concerns about your personal safety or the safety of others? -     MINA-7  12/7/2021   1. Feeling nervous, anxious, or on edge 1   2. Not being able to stop or control worrying 0   3. Worrying too much about different things 1   4. Trouble relaxing 0   5. Being so restless that it is hard to sit still 0 "   6. Becoming easily annoyed or irritable 0   7. Feeling afraid, as if something awful might happen 0   MINA-7 Total Score 2   If you checked any problems, how difficult have they made it for you to do your work, take care of things at home, or get along with other people? Not difficult at all       Suicide Assessment Five-step Evaluation and Treatment (SAFE-T)            Review of Systems   Constitutional, HEENT, cardiovascular, pulmonary, gi and gu systems are negative, except as otherwise noted.      Objective           Vitals:  No vitals were obtained today due to virtual visit.    Physical Exam   GENERAL: Healthy, alert and no distress  EYES: Eyes grossly normal to inspection.  No discharge or erythema, or obvious scleral/conjunctival abnormalities.  RESP: No audible wheeze, cough, or visible cyanosis.  No visible retractions or increased work of breathing.    SKIN: Visible skin clear. No significant rash, abnormal pigmentation or lesions.  NEURO: Cranial nerves grossly intact.  Mentation and speech appropriate for age.  PSYCH: Mentation appears normal, affect normal/bright, judgement and insight intact, normal speech and appearance well-groomed.    Office Visit on 07/12/2021   Component Date Value Ref Range Status     Ferritin 07/12/2021 10* 26 - 388 ng/mL Final     Iron 07/12/2021 51  35 - 180 ug/dL Final     Iron Binding Capacity 07/12/2021 466* 240 - 430 ug/dL Final     Iron Sat Index 07/12/2021 11* 15 - 46 % Final     TSH 07/12/2021 2.54  0.40 - 4.00 mU/L Final     Sodium 07/12/2021 141  133 - 144 mmol/L Final     Potassium 07/12/2021 4.4  3.4 - 5.3 mmol/L Final     Chloride 07/12/2021 109  94 - 109 mmol/L Final     Carbon Dioxide (CO2) 07/12/2021 28  20 - 32 mmol/L Final     Anion Gap 07/12/2021 4  3 - 14 mmol/L Final     Urea Nitrogen 07/12/2021 23  7 - 30 mg/dL Final     Creatinine 07/12/2021 0.95  0.66 - 1.25 mg/dL Final     Calcium 07/12/2021 9.2  8.5 - 10.1 mg/dL Final     Glucose 07/12/2021 124* 70 -  99 mg/dL Final     GFR Estimate 07/12/2021 84  >60 mL/min/1.73m2 Final    As of July 11, 2021, eGFR is calculated by the CKD-EPI creatinine equation, without race adjustment. eGFR can be influenced by muscle mass, exercise, and diet. The reported eGFR is an estimation only and is only applicable if the renal function is stable.     % Reticulocyte 07/12/2021 1.8  0.5 - 2.0 % Final     Absolute Reticulocyte 07/12/2021 0.079  0.025 - 0.095 10e6/uL Final     WBC Count 07/12/2021 7.3  4.0 - 11.0 10e3/uL Final     RBC Count 07/12/2021 4.30* 4.40 - 5.90 10e6/uL Final     Hemoglobin 07/12/2021 12.1* 13.3 - 17.7 g/dL Final     Hematocrit 07/12/2021 36.6* 40.0 - 53.0 % Final     MCV 07/12/2021 85  78 - 100 fL Final     MCH 07/12/2021 28.1  26.5 - 33.0 pg Final     MCHC 07/12/2021 33.1  31.5 - 36.5 g/dL Final     RDW 07/12/2021 13.4  10.0 - 15.0 % Final     Platelet Count 07/12/2021 218  150 - 450 10e3/uL Final     % Neutrophils 07/12/2021 57  % Final     % Lymphocytes 07/12/2021 27  % Final     % Monocytes 07/12/2021 11  % Final     % Eosinophils 07/12/2021 5  % Final     % Basophils 07/12/2021 0  % Final     Absolute Neutrophils 07/12/2021 4.2  1.6 - 8.3 10e3/uL Final     Absolute Lymphocytes 07/12/2021 2.0  0.8 - 5.3 10e3/uL Final     Absolute Monocytes 07/12/2021 0.8  0.0 - 1.3 10e3/uL Final     Absolute Eosinophils 07/12/2021 0.4  0.0 - 0.7 10e3/uL Final     Absolute Basophils 07/12/2021 0.0  0.0 - 0.2 10e3/uL Final               Video-Visit Details    Type of service:  Video Visit    Video End Time:0851    Originating Location (pt. Location): Home    Distant Location (provider location):  [facility] [employer]     Platform used for Video Visit: Oliver

## 2021-12-07 ENCOUNTER — VIRTUAL VISIT (OUTPATIENT)
Dept: FAMILY MEDICINE | Facility: CLINIC | Age: 66
End: 2021-12-07
Payer: COMMERCIAL

## 2021-12-07 DIAGNOSIS — F33.1 MAJOR DEPRESSIVE DISORDER, RECURRENT EPISODE, MODERATE (H): Primary | ICD-10-CM

## 2021-12-07 DIAGNOSIS — F11.11 HISTORY OF OPIOID ABUSE (H): ICD-10-CM

## 2021-12-07 DIAGNOSIS — E61.1 IRON DEFICIENCY: ICD-10-CM

## 2021-12-07 PROCEDURE — 99214 OFFICE O/P EST MOD 30 MIN: CPT | Mod: 95 | Performed by: NURSE PRACTITIONER

## 2021-12-07 RX ORDER — FLUOXETINE 40 MG/1
CAPSULE ORAL
Qty: 90 CAPSULE | Refills: 1 | Status: SHIPPED | OUTPATIENT
Start: 2021-12-07 | End: 2022-04-28

## 2021-12-07 ASSESSMENT — ANXIETY QUESTIONNAIRES
3. WORRYING TOO MUCH ABOUT DIFFERENT THINGS: SEVERAL DAYS
1. FEELING NERVOUS, ANXIOUS, OR ON EDGE: SEVERAL DAYS
6. BECOMING EASILY ANNOYED OR IRRITABLE: NOT AT ALL
GAD7 TOTAL SCORE: 2
2. NOT BEING ABLE TO STOP OR CONTROL WORRYING: NOT AT ALL
5. BEING SO RESTLESS THAT IT IS HARD TO SIT STILL: NOT AT ALL
IF YOU CHECKED OFF ANY PROBLEMS ON THIS QUESTIONNAIRE, HOW DIFFICULT HAVE THESE PROBLEMS MADE IT FOR YOU TO DO YOUR WORK, TAKE CARE OF THINGS AT HOME, OR GET ALONG WITH OTHER PEOPLE: NOT DIFFICULT AT ALL
7. FEELING AFRAID AS IF SOMETHING AWFUL MIGHT HAPPEN: NOT AT ALL

## 2021-12-07 ASSESSMENT — PATIENT HEALTH QUESTIONNAIRE - PHQ9: 5. POOR APPETITE OR OVEREATING: NOT AT ALL

## 2021-12-08 ASSESSMENT — PATIENT HEALTH QUESTIONNAIRE - PHQ9: SUM OF ALL RESPONSES TO PHQ QUESTIONS 1-9: 3

## 2021-12-08 ASSESSMENT — ANXIETY QUESTIONNAIRES: GAD7 TOTAL SCORE: 2

## 2021-12-31 ENCOUNTER — TELEPHONE (OUTPATIENT)
Dept: FAMILY MEDICINE | Facility: CLINIC | Age: 66
End: 2021-12-31
Payer: COMMERCIAL

## 2022-01-04 NOTE — TELEPHONE ENCOUNTER
Forms completed and placed in MA basket.     Lazara Orosco CNP    
I do not have any forms in my basket or desk for this patient    Thank you  Lazara Orosco CNP    
Reason for Call:  Form, our goal is to have forms completed with 72 hours, however, some forms may require a visit or additional information.    Type of letter, form or note:      Who is the form from?: Patient    Where did the form come from:     What clinic location was the form placed at?: [facility]     Where the form was placed: box Box/Folder    What number is listed as a contact on the form?:[phone]       Additional comments:     Call taken on 12/31/2021 at 2:37 PM by Elvia Landrum      
This has been faxed.   
unknown

## 2022-03-04 DIAGNOSIS — F33.1 MAJOR DEPRESSIVE DISORDER, RECURRENT EPISODE, MODERATE (H): ICD-10-CM

## 2022-03-04 RX ORDER — FLUOXETINE 40 MG/1
CAPSULE ORAL
Qty: 90 CAPSULE | Refills: 1 | OUTPATIENT
Start: 2022-03-04

## 2022-03-04 NOTE — TELEPHONE ENCOUNTER
Refilled 12/7/21 for #90 with 1 refill. Should not be needed.    Luly Law RN on 3/4/2022 at 4:57 PM

## 2022-03-12 ENCOUNTER — HEALTH MAINTENANCE LETTER (OUTPATIENT)
Age: 67
End: 2022-03-12

## 2022-04-27 NOTE — PROGRESS NOTES
Assessment & Plan   1. Major depressive disorder, recurrent episode, moderate (H): Controlled.  Has never done a trial off the medication over approximately 20 years.  Retiring in 1 month.  Would like to consider going off at that time.  Follow-up in 1 month to discuss this further.  Can do Medicare wellness visit at that time as well.  - FLUoxetine (PROZAC) 40 MG capsule; Take 1 capsule by mouth once daily  Dispense: 90 capsule; Refill: 1    2. Hypothyroidism, unspecified type: Due for TSH.  Refills given.  Call with results when available.  - TSH with free T4 reflex; Future  - levothyroxine (SYNTHROID/LEVOTHROID) 112 MCG tablet; Take 1 tablet (112 mcg) by mouth daily  Dispense: 90 tablet; Refill: 3    3. Iron deficiency: Recheck CBC.  Last done in July.  Currently taking iron supplement.  Previously normal colonoscopy.  Noted to have low ferritin consistent with iron deficiency anemia.  - CBC with platelets; Future    4. Opioid dependence in remission (H): Follow-up with his Suboxone provider.      Return in about 4 weeks (around 5/26/2022) for Physical Exam.    Rajinder Hernández MD  [facility]    This chart is completed utilizing dictation software; typos and/or incorrect word substitutions may unintentionally occur.      Naseem Salazar is a 66 year old who presents for the following health issues:    History of Present Illness       Mental Health Follow-up:                    Today's PHQ-9         PHQ-9 Total Score: 4  PHQ-9 Q9 Thoughts of better off dead/self-harm past 2 weeks :   (P) Not at all    How difficult have these problems made it for you to do your work, take care of things at home, or get along with other people: Somewhat difficult    Today's MINA-7 Score: 5    Hypothyroidism:     Since last visit, patient describes the following symptoms::  Depression    He eats 0-1 servings of fruits and vegetables daily.He consumes 1 sweetened beverage(s) daily.He exercises  with enough effort to increase his heart rate 20 to 29 minutes per day.  He exercises with enough effort to increase his heart rate 3 or less days per week.   He is taking medications regularly.     Depression Followup    How are you doing with your depression since your last visit? No change    Are you having other symptoms that might be associated with depression? No    Have you had a significant life event?  OTHER: retiring in 1 month     Are you feeling anxious or having panic attacks?   Yes:  minimal    Do you have any concerns with your use of alcohol or other drugs? No    Patient is here today for follow-up of his hypothyroidism as well as major depressive disorder.  He states he has been on Prozac for many years.  He was initially started on an SSRI after the death of his son in a house fire.  He has never been off of this medication.  He would not like to change any medications until after he retires in approximately 1 month.  He will make a follow-up appoint with his PCP at that time.  He denies any SI.  He denies any anhedonia, changes in eating habits, sleeping habits.  He denies any side effects with the Prozac medication.    He denies any side effects with his thyroid medication.  Does not frequently miss medication.    He is working on his Suboxone use with his Suboxone provider.  He is hopeful to get off of this at some point.    He is also been following with his PCP in regards to iron deficiency anemia.  He denies any bloody stools or melena.  He is up-to-date on his colonoscopy screenings last was done in 2018.  He was also anemic prior to this.  He is noting some constipation since starting iron supplement but has been consistent about using it.    He is not interested in doing his Medicare wellness visit today.    Social History     Tobacco Use     Smoking status: Never Smoker     Smokeless tobacco: Never Used   Vaping Use     Vaping Use: Never used   Substance Use Topics     Alcohol use: No      "Drug use: No     Comment: h/o chem dep due to post -op analgesics 1-06     PHQ 12/7/2021 4/28/2022 4/28/2022   PHQ-9 Total Score 3 4 4   Q9: Thoughts of better off dead/self-harm past 2 weeks Not at all Not at all Not at all   F/U: Thoughts of suicide or self-harm - - -   F/U: Safety concerns - - -     MINA-7 SCORE 12/7/2021 4/28/2022 4/28/2022   Total Score - - -   Total Score - - 5 (mild anxiety)   Total Score 2 5 5     Review of Systems   Constitutional, HEENT, neuro, psych, cardiovascular, pulmonary, gi and gu systems are negative, except as otherwise noted.      Objective    /78   Pulse 81   Temp 99.1  F (37.3  C) (Temporal)   Resp 16   Ht 1.727 m (5' 8\")   Wt 78.9 kg (174 lb)   SpO2 99%   BMI 26.46 kg/m    Body mass index is 26.46 kg/m .  Physical Exam   General: Appears well and in no acute distress.  Cardiovascular: egular rate and rhythm, normal S1 and S2 without murmur. No extra heartsounds or friction rub. Radial pulses present and equal bilaterally.  Respiratory: Lungs clear to auscultation bilaterally. No wheezing or crackles. No prolonged expiration. Symmetrical chest rise.  Musculoskeletal:  No gross extremity deformities. No peripheral edema. Normal muscle bulk.     Labs: Pending        "

## 2022-04-28 ENCOUNTER — OFFICE VISIT (OUTPATIENT)
Dept: FAMILY MEDICINE | Facility: CLINIC | Age: 67
End: 2022-04-28
Payer: COMMERCIAL

## 2022-04-28 VITALS
OXYGEN SATURATION: 99 % | TEMPERATURE: 99.1 F | WEIGHT: 174 LBS | HEIGHT: 68 IN | HEART RATE: 81 BPM | BODY MASS INDEX: 26.37 KG/M2 | RESPIRATION RATE: 16 BRPM | DIASTOLIC BLOOD PRESSURE: 78 MMHG | SYSTOLIC BLOOD PRESSURE: 136 MMHG

## 2022-04-28 DIAGNOSIS — F11.21 OPIOID DEPENDENCE IN REMISSION (H): ICD-10-CM

## 2022-04-28 DIAGNOSIS — E03.9 HYPOTHYROIDISM, UNSPECIFIED TYPE: ICD-10-CM

## 2022-04-28 DIAGNOSIS — F33.1 MAJOR DEPRESSIVE DISORDER, RECURRENT EPISODE, MODERATE (H): Primary | ICD-10-CM

## 2022-04-28 DIAGNOSIS — E61.1 IRON DEFICIENCY: ICD-10-CM

## 2022-04-28 LAB
ERYTHROCYTE [DISTWIDTH] IN BLOOD BY AUTOMATED COUNT: 12.4 % (ref 10–15)
HCT VFR BLD AUTO: 38.1 % (ref 40–53)
HGB BLD-MCNC: 12.5 G/DL (ref 13.3–17.7)
MCH RBC QN AUTO: 28.7 PG (ref 26.5–33)
MCHC RBC AUTO-ENTMCNC: 32.8 G/DL (ref 31.5–36.5)
MCV RBC AUTO: 87 FL (ref 78–100)
PLATELET # BLD AUTO: 206 10E3/UL (ref 150–450)
RBC # BLD AUTO: 4.36 10E6/UL (ref 4.4–5.9)
TSH SERPL DL<=0.005 MIU/L-ACNC: 2.31 MU/L (ref 0.4–4)
WBC # BLD AUTO: 7.9 10E3/UL (ref 4–11)

## 2022-04-28 PROCEDURE — 84443 ASSAY THYROID STIM HORMONE: CPT | Performed by: FAMILY MEDICINE

## 2022-04-28 PROCEDURE — 36415 COLL VENOUS BLD VENIPUNCTURE: CPT | Performed by: FAMILY MEDICINE

## 2022-04-28 PROCEDURE — 85027 COMPLETE CBC AUTOMATED: CPT | Performed by: FAMILY MEDICINE

## 2022-04-28 PROCEDURE — 96127 BRIEF EMOTIONAL/BEHAV ASSMT: CPT | Performed by: FAMILY MEDICINE

## 2022-04-28 PROCEDURE — 99214 OFFICE O/P EST MOD 30 MIN: CPT | Performed by: FAMILY MEDICINE

## 2022-04-28 RX ORDER — LEVOTHYROXINE SODIUM 112 UG/1
112 TABLET ORAL DAILY
Qty: 90 TABLET | Refills: 3 | Status: SHIPPED | OUTPATIENT
Start: 2022-04-28 | End: 2023-01-11

## 2022-04-28 RX ORDER — FLUOXETINE 40 MG/1
CAPSULE ORAL
Qty: 90 CAPSULE | Refills: 1 | Status: SHIPPED | OUTPATIENT
Start: 2022-04-28 | End: 2022-12-08

## 2022-04-28 ASSESSMENT — PATIENT HEALTH QUESTIONNAIRE - PHQ9
SUM OF ALL RESPONSES TO PHQ QUESTIONS 1-9: 4
SUM OF ALL RESPONSES TO PHQ QUESTIONS 1-9: 4
10. IF YOU CHECKED OFF ANY PROBLEMS, HOW DIFFICULT HAVE THESE PROBLEMS MADE IT FOR YOU TO DO YOUR WORK, TAKE CARE OF THINGS AT HOME, OR GET ALONG WITH OTHER PEOPLE: SOMEWHAT DIFFICULT

## 2022-04-28 ASSESSMENT — ANXIETY QUESTIONNAIRES
7. FEELING AFRAID AS IF SOMETHING AWFUL MIGHT HAPPEN: NOT AT ALL
GAD7 TOTAL SCORE: 5
1. FEELING NERVOUS, ANXIOUS, OR ON EDGE: SEVERAL DAYS
5. BEING SO RESTLESS THAT IT IS HARD TO SIT STILL: NOT AT ALL
4. TROUBLE RELAXING: SEVERAL DAYS
6. BECOMING EASILY ANNOYED OR IRRITABLE: SEVERAL DAYS
2. NOT BEING ABLE TO STOP OR CONTROL WORRYING: SEVERAL DAYS
GAD7 TOTAL SCORE: 5
3. WORRYING TOO MUCH ABOUT DIFFERENT THINGS: SEVERAL DAYS
7. FEELING AFRAID AS IF SOMETHING AWFUL MIGHT HAPPEN: NOT AT ALL
GAD7 TOTAL SCORE: 5

## 2022-04-28 ASSESSMENT — PAIN SCALES - GENERAL: PAINLEVEL: WORST PAIN (10)

## 2022-04-28 NOTE — RESULT ENCOUNTER NOTE
Please inform of results if patient has not viewed in zulily within 3 business days.    TSH - Your thyroid lab results were normal.    Please call the clinic with any questions you may have.     Have a great day,    Dr. Merchant

## 2022-04-28 NOTE — RESULT ENCOUNTER NOTE
"Please inform of results if patient has not viewed in [employer] within 3 business days.    Your hemoglobin, or \"iron\" level was only slightly improved. I would continue on the iron supplement for now. It is best to take this was a small amount of orange juice to help absorption.    Please call the clinic with any questions you may have.     Have a great day,    Dr. Mrechant"

## 2022-04-29 ASSESSMENT — PATIENT HEALTH QUESTIONNAIRE - PHQ9: SUM OF ALL RESPONSES TO PHQ QUESTIONS 1-9: 4

## 2022-04-29 ASSESSMENT — ANXIETY QUESTIONNAIRES: GAD7 TOTAL SCORE: 5

## 2022-12-05 DIAGNOSIS — F33.1 MAJOR DEPRESSIVE DISORDER, RECURRENT EPISODE, MODERATE (H): ICD-10-CM

## 2022-12-05 NOTE — LETTER
December 12, 2022      Martin Armstrong  [address] [facility] [address]              Dear Martin,    Your provider has sent a baljit refill for your FLUoxetine (PROZAC) 40 MG. You are due for a wellness visit before any more refills will be sent.      Please schedule via View[employer] or call [phone].      Have a good dayKEREN [facility] Center[facility] Silverman

## 2022-12-08 RX ORDER — FLUOXETINE 40 MG/1
CAPSULE ORAL
Qty: 30 CAPSULE | Refills: 0 | Status: SHIPPED | OUTPATIENT
Start: 2022-12-08 | End: 2023-01-11

## 2022-12-08 NOTE — TELEPHONE ENCOUNTER
Staff sent [employer] message to notify patient of required appointment for further medication refills.

## 2022-12-08 NOTE — TELEPHONE ENCOUNTER
"Pending Prescriptions:                       Disp   Refills    FLUoxetine (PROZAC) 40 MG capsule          90 cap*1        Sig: Take 1 capsule by mouth once daily    Routing refill request to provider for review/approval because:  Labs not current:  PHQ9  Patient needs to be seen because:  due for physical exam  Requested Prescriptions   Pending Prescriptions Disp Refills    FLUoxetine (PROZAC) 40 MG capsule 90 capsule 1     Sig: Take 1 capsule by mouth once daily       SSRIs Protocol Failed - 12/5/2022  8:11 AM        Failed - PHQ-9 score less than 5 in past 6 months     Please review last PHQ-9 score.           Failed - Recent (6 mo) or future (30 days) visit within the authorizing provider's specialty     Patient had office visit in the last 6 months or has a visit in the next 30 days with authorizing provider or within the authorizing provider's specialty.  See \"Patient Info\" tab in inbasket, or \"Choose Columns\" in Meds & Orders section of the refill encounter.            Passed - Medication is active on med list        Passed - Patient is age 18 or older                   "

## 2023-01-07 ENCOUNTER — HEALTH MAINTENANCE LETTER (OUTPATIENT)
Age: 68
End: 2023-01-07

## 2023-01-09 ENCOUNTER — MYC MEDICAL ADVICE (OUTPATIENT)
Dept: FAMILY MEDICINE | Facility: CLINIC | Age: 68
End: 2023-01-09

## 2023-01-09 DIAGNOSIS — F33.1 MAJOR DEPRESSIVE DISORDER, RECURRENT EPISODE, MODERATE (H): ICD-10-CM

## 2023-01-09 RX ORDER — FLUOXETINE 40 MG/1
CAPSULE ORAL
Qty: 30 CAPSULE | Refills: 0 | OUTPATIENT
Start: 2023-01-09

## 2023-01-09 NOTE — TELEPHONE ENCOUNTER
Staff sent [employer] message to notify patient of required appointment for further medication refills.

## 2023-01-09 NOTE — TELEPHONE ENCOUNTER
Medication Question or Refill      What medication are you calling about (include dose and sig)?: FLUoxetine (PROZAC) 40 MG capsule    Controlled Substance Agreement on file:   CSA -- Patient Level:    CSA: None found at the patient level.       Who prescribed the medication?: CARLA Merchant    Do you need a refill? Yes: has upcoming wellness appt with KL on 3/6  but will run out of meds this week    When did you use the medication last? today    Patient offered an appointment? No, has an appt    Do you have any questions or concerns?  No    Preferred Pharmacy:   [facility] [address] [place] [id number] [address] [address]  Phone: [phone] Fax: [phone]      Could we send this information to you in [employer] or would you prefer to receive a phone call?:   No preference   Okay to leave a detailed message?: Yes at Cell number on file:    Telephone Information:   Mobile [phone]

## 2023-01-11 ENCOUNTER — OFFICE VISIT (OUTPATIENT)
Dept: FAMILY MEDICINE | Facility: CLINIC | Age: 68
End: 2023-01-11
Payer: COMMERCIAL

## 2023-01-11 VITALS
SYSTOLIC BLOOD PRESSURE: 142 MMHG | TEMPERATURE: 98 F | OXYGEN SATURATION: 96 % | HEIGHT: 68 IN | HEART RATE: 82 BPM | BODY MASS INDEX: 26.67 KG/M2 | WEIGHT: 176 LBS | DIASTOLIC BLOOD PRESSURE: 88 MMHG

## 2023-01-11 DIAGNOSIS — Z23 NEED FOR PROPHYLACTIC VACCINATION AND INOCULATION AGAINST INFLUENZA: ICD-10-CM

## 2023-01-11 DIAGNOSIS — Z13.1 SCREENING FOR DIABETES MELLITUS: ICD-10-CM

## 2023-01-11 DIAGNOSIS — F19.20 CHEMICAL DEPENDENCY (H): ICD-10-CM

## 2023-01-11 DIAGNOSIS — Z00.00 ENCOUNTER FOR MEDICARE ANNUAL WELLNESS EXAM: Primary | ICD-10-CM

## 2023-01-11 DIAGNOSIS — E03.9 HYPOTHYROIDISM, UNSPECIFIED TYPE: ICD-10-CM

## 2023-01-11 DIAGNOSIS — F33.1 MAJOR DEPRESSIVE DISORDER, RECURRENT EPISODE, MODERATE (H): ICD-10-CM

## 2023-01-11 DIAGNOSIS — Z12.5 SCREENING FOR PROSTATE CANCER: ICD-10-CM

## 2023-01-11 DIAGNOSIS — F11.21 OPIOID DEPENDENCE IN REMISSION (H): ICD-10-CM

## 2023-01-11 DIAGNOSIS — I10 BENIGN ESSENTIAL HYPERTENSION: ICD-10-CM

## 2023-01-11 DIAGNOSIS — Z13.220 LIPID SCREENING: ICD-10-CM

## 2023-01-11 DIAGNOSIS — Z23 NEED FOR COVID-19 VACCINE: ICD-10-CM

## 2023-01-11 DIAGNOSIS — E61.1 IRON DEFICIENCY: ICD-10-CM

## 2023-01-11 LAB
ERYTHROCYTE [DISTWIDTH] IN BLOOD BY AUTOMATED COUNT: 12.2 % (ref 10–15)
HCT VFR BLD AUTO: 37.7 % (ref 40–53)
HGB BLD-MCNC: 12.3 G/DL (ref 13.3–17.7)
MCH RBC QN AUTO: 28.2 PG (ref 26.5–33)
MCHC RBC AUTO-ENTMCNC: 32.6 G/DL (ref 31.5–36.5)
MCV RBC AUTO: 87 FL (ref 78–100)
PLATELET # BLD AUTO: 217 10E3/UL (ref 150–450)
RBC # BLD AUTO: 4.36 10E6/UL (ref 4.4–5.9)
WBC # BLD AUTO: 7.5 10E3/UL (ref 4–11)

## 2023-01-11 PROCEDURE — 0124A COVID-19 VACCINE BIVALENT BOOSTER 12+ (PFIZER): CPT | Performed by: FAMILY MEDICINE

## 2023-01-11 PROCEDURE — 90662 IIV NO PRSV INCREASED AG IM: CPT | Performed by: FAMILY MEDICINE

## 2023-01-11 PROCEDURE — 80053 COMPREHEN METABOLIC PANEL: CPT | Performed by: FAMILY MEDICINE

## 2023-01-11 PROCEDURE — G0103 PSA SCREENING: HCPCS | Performed by: FAMILY MEDICINE

## 2023-01-11 PROCEDURE — 80061 LIPID PANEL: CPT | Performed by: FAMILY MEDICINE

## 2023-01-11 PROCEDURE — 91312 COVID-19 VACCINE BIVALENT BOOSTER 12+ (PFIZER): CPT | Performed by: FAMILY MEDICINE

## 2023-01-11 PROCEDURE — G0008 ADMIN INFLUENZA VIRUS VAC: HCPCS | Mod: 59 | Performed by: FAMILY MEDICINE

## 2023-01-11 PROCEDURE — G0438 PPPS, INITIAL VISIT: HCPCS | Performed by: FAMILY MEDICINE

## 2023-01-11 PROCEDURE — 84443 ASSAY THYROID STIM HORMONE: CPT | Performed by: FAMILY MEDICINE

## 2023-01-11 PROCEDURE — 36415 COLL VENOUS BLD VENIPUNCTURE: CPT | Performed by: FAMILY MEDICINE

## 2023-01-11 PROCEDURE — 85027 COMPLETE CBC AUTOMATED: CPT | Performed by: FAMILY MEDICINE

## 2023-01-11 PROCEDURE — 99214 OFFICE O/P EST MOD 30 MIN: CPT | Mod: 25 | Performed by: FAMILY MEDICINE

## 2023-01-11 RX ORDER — LEVOTHYROXINE SODIUM 112 UG/1
112 TABLET ORAL DAILY
Qty: 90 TABLET | Refills: 3 | Status: SHIPPED | OUTPATIENT
Start: 2023-01-11 | End: 2024-02-05

## 2023-01-11 RX ORDER — HYDROCHLOROTHIAZIDE 12.5 MG/1
12.5 TABLET ORAL DAILY
Qty: 30 TABLET | Refills: 0 | Status: SHIPPED | OUTPATIENT
Start: 2023-01-11 | End: 2023-01-23

## 2023-01-11 RX ORDER — FLUOXETINE 40 MG/1
CAPSULE ORAL
Qty: 90 CAPSULE | Refills: 1 | Status: SHIPPED | OUTPATIENT
Start: 2023-01-11 | End: 2023-07-07

## 2023-01-11 ASSESSMENT — ENCOUNTER SYMPTOMS
ABDOMINAL PAIN: 0
SHORTNESS OF BREATH: 1
ARTHRALGIAS: 1
DIZZINESS: 0
CONSTIPATION: 0
MYALGIAS: 1
SORE THROAT: 0
DYSURIA: 0
PARESTHESIAS: 0
WEAKNESS: 0
HEMATURIA: 0
PALPITATIONS: 0
CHILLS: 0
HEMATOCHEZIA: 0
HEADACHES: 0
HEARTBURN: 0
NERVOUS/ANXIOUS: 0
COUGH: 0
JOINT SWELLING: 0
DIARRHEA: 0
FREQUENCY: 0
EYE PAIN: 0
FEVER: 0
NAUSEA: 0

## 2023-01-11 ASSESSMENT — PATIENT HEALTH QUESTIONNAIRE - PHQ9
10. IF YOU CHECKED OFF ANY PROBLEMS, HOW DIFFICULT HAVE THESE PROBLEMS MADE IT FOR YOU TO DO YOUR WORK, TAKE CARE OF THINGS AT HOME, OR GET ALONG WITH OTHER PEOPLE: SOMEWHAT DIFFICULT
SUM OF ALL RESPONSES TO PHQ QUESTIONS 1-9: 8
SUM OF ALL RESPONSES TO PHQ QUESTIONS 1-9: 8

## 2023-01-11 ASSESSMENT — ANXIETY QUESTIONNAIRES
4. TROUBLE RELAXING: NOT AT ALL
1. FEELING NERVOUS, ANXIOUS, OR ON EDGE: SEVERAL DAYS
5. BEING SO RESTLESS THAT IT IS HARD TO SIT STILL: NOT AT ALL
GAD7 TOTAL SCORE: 4
2. NOT BEING ABLE TO STOP OR CONTROL WORRYING: SEVERAL DAYS
GAD7 TOTAL SCORE: 4
IF YOU CHECKED OFF ANY PROBLEMS ON THIS QUESTIONNAIRE, HOW DIFFICULT HAVE THESE PROBLEMS MADE IT FOR YOU TO DO YOUR WORK, TAKE CARE OF THINGS AT HOME, OR GET ALONG WITH OTHER PEOPLE: SOMEWHAT DIFFICULT
GAD7 TOTAL SCORE: 4
6. BECOMING EASILY ANNOYED OR IRRITABLE: SEVERAL DAYS
7. FEELING AFRAID AS IF SOMETHING AWFUL MIGHT HAPPEN: NOT AT ALL
3. WORRYING TOO MUCH ABOUT DIFFERENT THINGS: SEVERAL DAYS
8. IF YOU CHECKED OFF ANY PROBLEMS, HOW DIFFICULT HAVE THESE MADE IT FOR YOU TO DO YOUR WORK, TAKE CARE OF THINGS AT HOME, OR GET ALONG WITH OTHER PEOPLE?: SOMEWHAT DIFFICULT
7. FEELING AFRAID AS IF SOMETHING AWFUL MIGHT HAPPEN: NOT AT ALL

## 2023-01-11 ASSESSMENT — PAIN SCALES - GENERAL: PAINLEVEL: NO PAIN (0)

## 2023-01-11 ASSESSMENT — ACTIVITIES OF DAILY LIVING (ADL): CURRENT_FUNCTION: NO ASSISTANCE NEEDED

## 2023-01-11 NOTE — PATIENT INSTRUCTIONS
"Call your insurance about where to get the shingles and pneumonia vaccines if interested.    Patient Education   Personalized Prevention Plan  You are due for the preventive services outlined below.  Your care team is available to assist you in scheduling these services.  If you have already completed any of these items, please share that information with your care team to update in your medical record.  Health Maintenance Due   Topic Date Due    Zoster (Shingles) Vaccine (2 of 2) 04/08/2021    Pneumococcal Vaccine (1 - PCV) Never done    AORTIC ANEURYSM SCREENING (SYSTEM ASSIGNED)  Never done    Annual Wellness Visit  02/11/2022    COVID-19 Vaccine (5 - Booster for Pfizer series) 07/11/2022    Flu Vaccine (1) 09/01/2022    ANNUAL REVIEW OF HM ORDERS  12/07/2022       Depression and Suicide in Older Adults    Nearly 2 million older Americans have some type of depression. Some of them even take their own lives. Yet depression among older adults is often ignored. Learn the warning signs. You may help spare a loved one needless pain. You may also save a life.   What is depression?  Depression is a common and serious illness that affects the way you think and feel. It is not a normal part of aging, nor is it a sign of weakness, a character flaw, or something you can snap out of. Most people with depression need treatment to get better. The most common symptom is a feeling of deep sadness. People who are depressed also may seem tired and listless. And nothing seems to give them pleasure. It s normal to grieve or be sad sometimes. But sadness lessens or passes with time. Depression rarely goes away or improves on its own. A person with clinical depression can't \"snap out of it.\" Other symptoms of depression are:   Sleeping more or less than normal  Eating more or less than normal  Having headaches, stomachaches, or other pains that don t go away  Feeling nervous,  empty,  or worthless  Crying a great deal  Thinking or " talking about suicide or death  Loss of interest in activities previously enjoyed  Social isolation  Feeling confused or forgetful  What causes it?  The causes of depression aren t fully known. But it is thought to result from a complex blend of these factors:   Biochemistry. Certain chemicals in the brain play a role.  Genes. Depression does run in families.  Life stress. Life stresses can also trigger depression in some people. Older adults often face many stressors, such as death of friends or a spouse, health problems, and financial concerns.  Chronic conditions. This includes conditions such as diabetes, heart disease, or cancer. These can cause symptoms of depression. Medicine side effects can cause changes in thoughts and behaviors.  How you can help  Often, depressed people may not want to ask for help. When they do, they may be ignored. Or, they may receive the wrong treatment. You can help by showing parents and older friends love and support. If they seem depressed, don t lecture the person, ignore the symptoms, or discount the symptoms as a  normal  part of aging -which they are not. Get involved, listen, and show interest and support.   Help them understand that depression is a treatable illness. Tell them you can help them find the right treatment. Offer to go to their healthcare provider's appointment with them for support when the symptoms are discussed. With their approval, contact a local mental health center, social service agency, or hospital about services.   You can be an advocate for him or her at healthcare appointments. Many older adults have chronic illnesses that can cause symptoms of depression. Medicine side effects can change thoughts and behaviors. You can help make sure that the healthcare provider looks at all of these factors. He or she should refer your family member or friend to a mental healthcare provider when needed. in some cases, untreated depression can lead to a  misdiagnosis. A person may be diagnosed with a brain disorder such as dementia. If the healthcare provider does not take the issue of depression seriously, help your family member or friend to find another provider.   Don't be afraid to ask  If you think an older person you care about could be suicidal, ask,  Have you thought about suicide?  Most people will tell you the truth. If they say  yes,  they may already have a plan for how and when they will attempt it. Find out as much as you can. The more detailed the plan, and the easier it is to carry out, the more danger the person is in right now. Tell the person you are there for them and do not want them to harm him or herself. Don't wait to get help for the person. Call the person's healthcare provider, local hospital, or emergency services.   To learn more  National Suicide Prevention Lifeline (crisis hotline) 892-873-IUER (539-251-1163)  National Jeannette of Mental Swoydi700-047-9605xhq.[facility].nih.gov  National [place] on Mental Nytsien026-011-8134amy.pina.org  Mental Health Ywfhods879-235-0291fuj.[facility].org  National Suicide Oyerqgz562-RGMKOCJ (241-790-3760)    Call 911  Never leave the person alone. A person who is actively suicidal needs psychiatric care right away. They will need constant supervision. Never leave the person out of sight. Call 911 or the national 24-hour suicide crisis hotline at 140-597-SZLN (361-183-4366). You can also take the person to the closest emergency room.   [employer] last reviewed this educational content on 5/1/2020 2000-2021 [employer]. All rights reserved. This information is not intended as a substitute for professional medical care. Always follow your healthcare professional's instructions.          Preventing Falls at Home  A person can fall for many reasons. Older adults may fall because reaction time slows down as we age. Your muscles and joints may get stiff, weak, or less flexible because of illness,  medicines, or a physical condition.   Other health problems that make falls more likely include:   Arthritis  Dizziness or lightheadedness when you stand up (orthostatic hypotension)  History of a stroke  Dizziness  Anemia  Certain medicines taken for mental illness or to control blood pressure.  Problems with balance or gait  Bladder or urinary problems  History of falling  Changes in vision (vision impairment)  Changes in thinking skills and memory (cognitive impairment)  Injuries from a fall can include serious injuries such as broken bones, dislocated joints, internal bleeding and cuts. Injuries like these can limit your independence.   Prevention tips  To help prevent falls and fall-related injuries, follow the tips below.    Floors  To make floors safer:   Put nonskid pads under area rugs.  Remove small rugs.  Replace worn floor coverings.  Tack carpets firmly to each step on carpeted stairs. Put nonskid strips on the edges of uncarpeted stairs.  Keep floors and stairs free of clutter and cords.  Arrange furniture so there are clear pathways.  Clean up any spills right away.  Bathrooms    To make bathrooms safer:   Install grab bars in the tub or shower.  Apply nonskid strips or put a nonskid rubber mat in the tub or shower.  Sit on a bath chair to bathe.  Use bathmats with nonskid backing.  Lighting  To improve visibility in your home:    Keep a flashlight in each room. Or put a lamp next to the bed within easy reach.  Put nightlights in the bedrooms, hallways, kitchen, and bathrooms.  Make sure all stairways have good lighting.  Take your time when going up and down stairs.  Put handrails on both sides of stairs and in walkways for more support. To prevent injury to your wrist or arm, don t use handrails to pull yourself up.  Install grab bars to pull yourself up.  Move or rearrange items that you use often. This will make them easier to find or reach.  Look at your home to find any safety hazards.  Especially look at doorways, walkways, and the driveway. Remove or repair any safety problems that you find.  Other changes to make  Look around to find any safety hazards. Look closely at doorways, walkways, and the driveway. Remove or repair any safety problems that you find.  Wear shoes that fit well.  Take your time when going up and down stairs.  Put handrails on both sides of stairs and in walkways for more support. To prevent injury to your wrist or arm, don t use handrails to pull yourself up.  Install grab bars wherever needed to pull yourself up.  Arrange items that you use often. This will make them easier to find or reach.    [employer] last reviewed this educational content on 3/1/2020    2999-2382 [employer]. All rights reserved. This information is not intended as a substitute for professional medical care. Always follow your healthcare professional's instructions.

## 2023-01-11 NOTE — PROGRESS NOTES
"SUBJECTIVE:   Martin is a 67 year old who presents for Preventive Visit.    Patient has been advised of split billing requirements and indicates understanding: Yes  Are you in the first 12 months of your Medicare coverage?  Yes,  Visual Acuity:  Right Eye: 20/40   Left Eye: 20/40  Both Eyes: 20/40    Healthy Habits:     In general, how would you rate your overall health?  Good    Frequency of exercise:  2-3 days/week    Duration of exercise:  15-30 minutes    Do you usually eat at least 4 servings of fruit and vegetables a day, include whole grains    & fiber and avoid regularly eating high fat or \"junk\" foods?  No    Taking medications regularly:  Yes    Medication side effects:  None    Ability to successfully perform activities of daily living:  No assistance needed    Home Safety:  No safety concerns identified    Hearing Impairment:  No hearing concerns    In the past 6 months, have you been bothered by leaking of urine?  No    In general, how would you rate your overall mental or emotional health?  Good      PHQ-2 Total Score: 2    Additional concerns today:  No    Continues on suboxone prescribed by outside provider.    Has noticed elevated home blood pressures in the 140s systolically.  Would like to start on blood pressure medication given elevated readings today.  Denies chest pains or shortness of breath.    Would be interested in prostate cancer screening after risk-benefit discussion.    No significant side effects with levothyroxine or Prozac.    No other questions or concerns today.    Have you ever done Advance Care Planning? (For example, a Health Directive, POLST, or a discussion with a medical provider or your loved ones about your wishes): No, advance care planning information given to patient to review.  Patient declined advance care planning discussion at this time.    Fall risk  Fallen 2 or more times in the past year?: No  Any fall with injury in the past year?: Yes    Cognitive Screening   1) " Repeat 3 items (Leader, Season, Table)    2) Clock draw: NORMAL  3) 3 item recall: Recalls 1 object   Results: NORMAL clock, 1-2 items recalled: COGNITIVE IMPAIRMENT LESS LIKELY    Mini-CogTM Copyright S Bertha. Licensed by the author for use in [facility]; reprinted with permission (xu@[facility]). All rights reserved.      Do you have sleep apnea, excessive snoring or daytime drowsiness?: no    Reviewed and updated as needed this visit by clinical staff   Tobacco  Allergies  Meds  Problems  Med Hx  Surg Hx  Fam Hx          Reviewed and updated as needed this visit by Provider   Tobacco  Allergies  Meds  Problems  Med Hx  Surg Hx  Fam Hx       social history reviewed.  Social History     Tobacco Use     Smoking status: Never     Smokeless tobacco: Never   Substance Use Topics     Alcohol use: No     Alcohol Use 1/11/2023   Prescreen: >3 drinks/day or >7 drinks/week? Not Applicable   Prescreen: >3 drinks/day or >7 drinks/week? -     Current providers sharing in care for this patient include:   Patient Care Team:  Rajinder Hernández MD as PCP - General (Family Medicine)  Nancy Sen APRN CNP as Assigned PCP    The following health maintenance items are reviewed in Epic and correct as of today:  Health Maintenance   Topic Date Due     ZOSTER IMMUNIZATION (2 of 2) 04/08/2021     Pneumococcal Vaccine: 65+ Years (1 - PCV) Never done     TSH W/FREE T4 REFLEX  04/28/2023     PHQ-9  07/11/2023     COLORECTAL CANCER SCREENING  11/05/2023     MEDICARE ANNUAL WELLNESS VISIT  01/11/2024     MINA ASSESSMENT  01/11/2024     ANNUAL REVIEW OF HM ORDERS  01/11/2024     FALL RISK ASSESSMENT  01/11/2024     LIPID  02/11/2026     ADVANCE CARE PLANNING  01/11/2028     DTAP/TDAP/TD IMMUNIZATION (4 - Td or Tdap) 02/11/2031     HEPATITIS C SCREENING  Completed     DEPRESSION ACTION PLAN  Completed     INFLUENZA VACCINE  Completed     COVID-19 Vaccine  Completed     IPV IMMUNIZATION  Aged Out     MENINGITIS  IMMUNIZATION  Aged Out     AORTIC ANEURYSM SCREENING (SYSTEM ASSIGNED)  Discontinued     Lab work is in process  BP Readings from Last 3 Encounters:   01/11/23 (!) 142/88   04/28/22 136/78   07/12/21 118/64    Wt Readings from Last 3 Encounters:   01/11/23 79.8 kg (176 lb)   04/28/22 78.9 kg (174 lb)   07/12/21 79.8 kg (176 lb)          Patient Active Problem List   Diagnosis     CARDIOVASCULAR SCREENING; LDL GOAL LESS THAN 160     DJD (degenerative joint disease) of knee     Abnormal gait     Advanced directives, counseling/discussion     Diverticulosis     Hypothyroidism     Major depressive disorder     Osteoarthritis of left hand     Status post left partial knee replacement     Ganglion, finger of right hand     Spinal stenosis of lumbar region without neurogenic claudication     Elevated fasting glucose     Anxiety state     Arthropathy     Backache     Major depressive disorder, recurrent episode, moderate (H)     Past Surgical History:   Procedure Laterality Date     ARTHROPLASTY KNEE UNICOMPARTMENT  11/8/2011    Procedure:ARTHROPLASTY KNEE UNICOMPARTMENT; LEFT KNEE UNICOMPARTMENT ARTHROPLASTY (FREEMAN)^; Surgeon:SHABBIR RADER; Location: OR     ARTHROSCOPY KNEE RT/LT  1/2006    left     COLONOSCOPY  3/27/08    due in 2013     COLONOSCOPY WITH CO2 INSUFFLATION N/A 11/5/2018    Procedure: screening colonoscopy;  Surgeon: Adrian Hawthorne MD;  Location:  OR       Social History     Tobacco Use     Smoking status: Never     Smokeless tobacco: Never   Substance Use Topics     Alcohol use: No     Family History   Problem Relation Age of Onset     Family History Negative Father      Cancer Mother      Eye Disorder Paternal Grandmother      Asthma No family hx of      C.A.D. No family hx of      Diabetes No family hx of      Hypertension No family hx of      Cerebrovascular Disease No family hx of      Breast Cancer No family hx of      Cancer - colorectal No family hx of      Prostate Cancer No family hx of   "        Current Outpatient Medications   Medication Sig Dispense Refill     aspirin-acetaminophen-caffeine (EXCEDRIN MIGRAINE) 250-250-65 MG tablet Take 1 tablet by mouth every 6 hours as needed for headaches       buprenorphine-naloxone (SUBOXONE) 8-2 MG SUBL sublingual tablet        FLUoxetine (PROZAC) 40 MG capsule Take 1 capsule by mouth once daily 90 capsule 1     hydrochlorothiazide (HYDRODIURIL) 12.5 MG tablet Take 1 tablet (12.5 mg) by mouth daily 30 tablet 0     levothyroxine (SYNTHROID/LEVOTHROID) 112 MCG tablet Take 1 tablet (112 mcg) by mouth daily 90 tablet 3     Multiple Vitamins-Minerals (MULTIVITAL PO)        NAPROXEN PO        No Known Allergies    Review of Systems   Constitutional: Negative for chills and fever.   HENT: Negative for congestion, ear pain, hearing loss and sore throat.    Eyes: Negative for pain and visual disturbance.   Respiratory: Positive for shortness of breath. Negative for cough.    Cardiovascular: Negative for chest pain, palpitations and peripheral edema.   Gastrointestinal: Negative for abdominal pain, constipation, diarrhea, heartburn, hematochezia and nausea.   Genitourinary: Negative for dysuria, frequency, genital sores, hematuria, impotence, penile discharge and urgency.   Musculoskeletal: Positive for arthralgias and myalgias. Negative for joint swelling.   Skin: Negative for rash.   Neurological: Negative for dizziness, weakness, headaches and paresthesias.   Psychiatric/Behavioral: Positive for mood changes. The patient is not nervous/anxious.      OBJECTIVE:   BP (!) 142/88   Pulse 82   Temp 98  F (36.7  C) (Temporal)   Ht 1.73 m (5' 8.11\")   Wt 79.8 kg (176 lb)   SpO2 96%   BMI 26.67 kg/m   Estimated body mass index is 26.67 kg/m  as calculated from the following:    Height as of this encounter: 1.73 m (5' 8.11\").    Weight as of this encounter: 79.8 kg (176 lb).  Physical Exam  GENERAL: healthy, alert and no distress  EYES: Eyes grossly normal to " inspection, PERRL and conjunctivae and sclerae normal  HENT: ear canals and TM's normal, nose and mouth without ulcers or lesions  NECK: no adenopathy, no asymmetry, masses, or scars and thyroid normal to palpation  RESP: lungs clear to auscultation - no rales, rhonchi or wheezes  CV: regular rate and rhythm, normal S1 S2, no S3 or S4, no murmur, click or rub, no peripheral edema and peripheral pulses strong  ABDOMEN: soft, nontender, no hepatosplenomegaly, no masses and bowel sounds normal  MS: no gross musculoskeletal defects noted, no edema  SKIN: no suspicious lesions or rashes  NEURO: Normal strength and tone, mentation intact and speech normal  PSYCH: mentation appears normal, affect normal/bright    Labs: pending    ASSESSMENT / PLAN:   1. Encounter for Medicare annual wellness exam: Discussed personal health and safety. Routine screenings as below. Appropriate anticipatory guidance, vaccinations, and health screening recommendations delivered according to the USPSTF and other appropriate society guidelines.  Patient understands and is agreeable with the plan ordered below.  - REVIEW OF HEALTH MAINTENANCE PROTOCOL ORDERS  - COVID-19,PF,PFIZER BOOSTER BIVALENT (12+YRS)  - INFLUENZA, QUAD, HIGH DOSE, PF, 65YR + (FLUZONE HD)  - ADMIN INFLUENZA (For MEDICARE Patients ONLY) []  - IMMUNIATION ADMIN EACH ADDT'  - CBC with platelets; Future  - TSH with free T4 reflex; Future  - Lipid panel reflex to direct LDL Fasting; Future  - Comprehensive metabolic panel (BMP + Alb, Alk Phos, ALT, AST, Total. Bili, TP); Future  - PRIMARY CARE FOLLOW-UP SCHEDULING; Future  - PSA, screen; Future    2. Major depressive disorder, recurrent episode, moderate (H): Controlled. Refills given. No SI. No significant side effects.  - FLUoxetine (PROZAC) 40 MG capsule; Take 1 capsule by mouth once daily  Dispense: 90 capsule; Refill: 1    3. Hypothyroidism, unspecified type: Due for labs and refills.  - levothyroxine  (SYNTHROID/LEVOTHROID) 112 MCG tablet; Take 1 tablet (112 mcg) by mouth daily  Dispense: 90 tablet; Refill: 3  - TSH with free T4 reflex; Future  - TSH with free T4 reflex    4. Benign essential hypertension: Start hydrochlorothiazide as below. Check bmp and recheck bp in 2 weeks.  - hydrochlorothiazide (HYDRODIURIL) 12.5 MG tablet; Take 1 tablet (12.5 mg) by mouth daily  Dispense: 30 tablet; Refill: 0  - Basic metabolic panel  (Ca, Cl, CO2, Creat, Gluc, K, Na, BUN); Future  - PRIMARY CARE FOLLOW-UP SCHEDULING; Future  - PRIMARY CARE FOLLOW-UP SCHEDULING; Future    5. Opioid dependence in remission (H): Continue on suboxone from outside provider.    6. History of chemical dependency (H): As above.    7. Iron deficiency  - CBC with platelets; Future    8. Lipid screening  - Lipid panel reflex to direct LDL Fasting; Future    9. Screening for diabetes mellitus  - Comprehensive metabolic panel (BMP + Alb, Alk Phos, ALT, AST, Total. Bili, TP); Future    10. Screening for prostate cancer: Patient elects to undergo PSA screening after informed decision making process. This discussion with the patient included reviewing the benefits of testing such as: Ability to easily add on to existing blood work, screening for a common cancer in men which has treatment options and otherwise may have been silent, and possibility for early detection to prevent morbidity from future metastasis and/or death. Additionally, risks were discussed including possibility of false positive testing (leading to anxiety and further unnecessary testing/biopsies), possibility of over treating a cancer that may not affect a man in his lifetime, and the side effects of the current treatment options for prosate cancer (urinary incontinence, ED, etc).  - PSA, screen; Future    11. Need for COVID-19 vaccine  - COVID-19,PF,PFIZER BOOSTER BIVALENT (12+YRS)  - IMMUNIATION ADMIN EACH ADDT'    12. Need for prophylactic vaccination and inoculation against  "influenza  - INFLUENZA, QUAD, HIGH DOSE, PF, 65YR + (FLUZONE HD)  - ADMIN INFLUENZA (For MEDICARE Patients ONLY) []    COUNSELING:  Reviewed preventive health counseling, as reflected in patient instructions       Regular exercise       Healthy diet/nutrition       Vision screening       Hearing screening       Dental care       Fall risk prevention       Colon cancer screening       Prostate cancer screening    BMI:   Estimated body mass index is 26.67 kg/m  as calculated from the following:    Height as of this encounter: 1.73 m (5' 8.11\").    Weight as of this encounter: 79.8 kg (176 lb).   Weight management plan: Discussed healthy diet and exercise guidelines      He reports that he has never smoked. He has never used smokeless tobacco.      Appropriate preventive services were discussed with this patient, including applicable screening as appropriate for cardiovascular disease, diabetes, osteopenia/osteoporosis, and glaucoma.  As appropriate for age/gender, discussed screening for colorectal cancer, prostate cancer, breast cancer, and cervical cancer. Checklist reviewing preventive services available has been given to the patient.    Reviewed patients plan of care and provided an AVS. The Basic Care Plan (routine screening as documented in Health Maintenance) for Martin meets the Care Plan requirement. This Care Plan has been established and reviewed with the Patient.    Rajinder Hernández MD  [facility]    Disclaimer: This note consists of symbols derived from keyboarding, dictation and/or voice recognition software. As a result, there may be errors in the script that have gone undetected. Please consider this when interpreting information found in this chart.    Identified Health Risks:  Answers for HPI/ROS submitted by the patient on 1/11/2023  If you checked off any problems, how difficult have these problems made it for you to do your work, take care of things at " home, or get along with other people?: Somewhat difficult  PHQ9 TOTAL SCORE: 8  MINA 7 TOTAL SCORE: 4        The patient's PHQ-9 score is consistent with mild depression.   He is at risk for falling and has been provided with information to reduce the risk of falling at home.

## 2023-01-11 NOTE — RESULT ENCOUNTER NOTE
Please inform of results if patient has not viewed in [employer] within 3 business days.    Your cell counts are stable.    Your other labs are pending.    Please call the clinic with any questions you may have.     Have a great day,    Dr. Merchant

## 2023-01-12 LAB
ALBUMIN SERPL-MCNC: 4.3 G/DL (ref 3.4–5)
ALP SERPL-CCNC: 85 U/L (ref 40–150)
ALT SERPL W P-5'-P-CCNC: 27 U/L (ref 0–70)
ANION GAP SERPL CALCULATED.3IONS-SCNC: 6 MMOL/L (ref 3–14)
AST SERPL W P-5'-P-CCNC: 22 U/L (ref 0–45)
BILIRUB SERPL-MCNC: 0.6 MG/DL (ref 0.2–1.3)
BUN SERPL-MCNC: 22 MG/DL (ref 7–30)
CALCIUM SERPL-MCNC: 9.4 MG/DL (ref 8.5–10.1)
CHLORIDE BLD-SCNC: 105 MMOL/L (ref 94–109)
CHOLEST SERPL-MCNC: 149 MG/DL
CO2 SERPL-SCNC: 29 MMOL/L (ref 20–32)
CREAT SERPL-MCNC: 1 MG/DL (ref 0.66–1.25)
FASTING STATUS PATIENT QL REPORTED: NO
GFR SERPL CREATININE-BSD FRML MDRD: 83 ML/MIN/1.73M2
GLUCOSE BLD-MCNC: 96 MG/DL (ref 70–99)
HDLC SERPL-MCNC: 44 MG/DL
LDLC SERPL CALC-MCNC: 86 MG/DL
NONHDLC SERPL-MCNC: 105 MG/DL
POTASSIUM BLD-SCNC: 4.2 MMOL/L (ref 3.4–5.3)
PROT SERPL-MCNC: 7.5 G/DL (ref 6.8–8.8)
PSA SERPL-MCNC: 0.28 UG/L (ref 0–4)
SODIUM SERPL-SCNC: 140 MMOL/L (ref 133–144)
TRIGL SERPL-MCNC: 93 MG/DL
TSH SERPL DL<=0.005 MIU/L-ACNC: 3.92 MU/L (ref 0.4–4)

## 2023-01-12 NOTE — RESULT ENCOUNTER NOTE
Please inform of results if patient has not viewed in [email or web address] within 3 business days.    TSH - Your thyroid lab results were normal.    PSA - Your Prostate cancer screening lab results were normal.    Lipids - Your cholesterol lab results were normal.    CMP Results - Your blood sugar was normal at 96. Your kidney function, electrolytes, and liver function were normal.    Please call the clinic with any questions you may have.     Have a great day,    Dr. Merchant

## 2023-01-23 ENCOUNTER — ALLIED HEALTH/NURSE VISIT (OUTPATIENT)
Dept: FAMILY MEDICINE | Facility: CLINIC | Age: 68
End: 2023-01-23
Payer: COMMERCIAL

## 2023-01-23 ENCOUNTER — LAB (OUTPATIENT)
Dept: LAB | Facility: CLINIC | Age: 68
End: 2023-01-23
Payer: COMMERCIAL

## 2023-01-23 VITALS — SYSTOLIC BLOOD PRESSURE: 136 MMHG | DIASTOLIC BLOOD PRESSURE: 86 MMHG

## 2023-01-23 DIAGNOSIS — I10 BENIGN ESSENTIAL HYPERTENSION: ICD-10-CM

## 2023-01-23 DIAGNOSIS — I10 BENIGN ESSENTIAL HYPERTENSION: Primary | ICD-10-CM

## 2023-01-23 PROCEDURE — 99207 PR NO CHARGE NURSE ONLY: CPT

## 2023-01-23 RX ORDER — HYDROCHLOROTHIAZIDE 12.5 MG/1
12.5 TABLET ORAL DAILY
Qty: 90 TABLET | Refills: 3 | Status: SHIPPED | OUTPATIENT
Start: 2023-01-23 | End: 2024-02-05

## 2023-01-23 NOTE — Clinical Note
Patient states will need refill of hydrochlorothiazide if you want him to stay on this and telephone call if needing to change anything.  Kathryn Brink CMA ([facility])

## 2023-01-23 NOTE — NURSING NOTE
Chief Complaint   Patient presents with     Allied Health Visit     BP CHECK     Martin Armstrong is a 67 year old patient who comes in today for a Blood Pressure check.  Initial BP:  /86      Data Unavailable  Disposition: follow-up as previously indicated by provider    Kathryn Brink CMA ([facility])

## 2023-07-05 DIAGNOSIS — F33.1 MAJOR DEPRESSIVE DISORDER, RECURRENT EPISODE, MODERATE (H): ICD-10-CM

## 2023-07-05 NOTE — TELEPHONE ENCOUNTER
"Pending Prescriptions:                       Disp   Refills    FLUoxetine (PROZAC) 40 MG capsule          90 cap*1        Sig: Take 1 capsule by mouth once daily    Routing refill request to provider for review/approval because:  Labs out of range:      PHQ-9 score:        1/11/2023     3:03 PM   PHQ   PHQ-9 Total Score 8   Q9: Thoughts of better off dead/self-harm past 2 weeks Several days   F/U: Thoughts of suicide or self-harm No   F/U: Safety concerns No         Requested Prescriptions   Pending Prescriptions Disp Refills    FLUoxetine (PROZAC) 40 MG capsule 90 capsule 1     Sig: Take 1 capsule by mouth once daily       SSRIs Protocol Failed - 7/5/2023  2:21 PM        Failed - PHQ-9 score less than 5 in past 6 months     Please review last PHQ-9 score.           Passed - Medication is active on med list        Passed - Patient is age 18 or older        Passed - Recent (6 mo) or future (30 days) visit within the authorizing provider's specialty     Patient had office visit in the last 6 months or has a visit in the next 30 days with authorizing provider or within the authorizing provider's specialty.  See \"Patient Info\" tab in inbasket, or \"Choose Columns\" in Meds & Orders section of the refill encounter.                       "

## 2023-07-07 ENCOUNTER — MYC MEDICAL ADVICE (OUTPATIENT)
Dept: FAMILY MEDICINE | Facility: CLINIC | Age: 68
End: 2023-07-07
Payer: COMMERCIAL

## 2023-07-07 RX ORDER — FLUOXETINE 40 MG/1
CAPSULE ORAL
Qty: 90 CAPSULE | Refills: 0 | Status: SHIPPED | OUTPATIENT
Start: 2023-07-07 | End: 2023-09-20

## 2023-07-10 NOTE — TELEPHONE ENCOUNTER
Message read on 7/7/2023  8:53 AM by Martin Armstrong; closing.    Kathryn Brink CMA ([facility])

## 2023-09-20 ENCOUNTER — OFFICE VISIT (OUTPATIENT)
Dept: FAMILY MEDICINE | Facility: CLINIC | Age: 68
End: 2023-09-20
Payer: COMMERCIAL

## 2023-09-20 VITALS
SYSTOLIC BLOOD PRESSURE: 134 MMHG | HEART RATE: 55 BPM | TEMPERATURE: 98.5 F | OXYGEN SATURATION: 98 % | DIASTOLIC BLOOD PRESSURE: 64 MMHG | RESPIRATION RATE: 16 BRPM | WEIGHT: 165.5 LBS | BODY MASS INDEX: 25.08 KG/M2 | HEIGHT: 68 IN

## 2023-09-20 DIAGNOSIS — Z23 NEED FOR VACCINATION FOR PNEUMOCOCCUS: ICD-10-CM

## 2023-09-20 DIAGNOSIS — F33.1 MAJOR DEPRESSIVE DISORDER, RECURRENT EPISODE, MODERATE (H): Primary | ICD-10-CM

## 2023-09-20 DIAGNOSIS — Z23 NEED FOR IMMUNIZATION AGAINST INFLUENZA: ICD-10-CM

## 2023-09-20 PROCEDURE — 90677 PCV20 VACCINE IM: CPT | Performed by: FAMILY MEDICINE

## 2023-09-20 PROCEDURE — 90662 IIV NO PRSV INCREASED AG IM: CPT | Performed by: FAMILY MEDICINE

## 2023-09-20 PROCEDURE — G0008 ADMIN INFLUENZA VIRUS VAC: HCPCS | Performed by: FAMILY MEDICINE

## 2023-09-20 PROCEDURE — G0009 ADMIN PNEUMOCOCCAL VACCINE: HCPCS | Performed by: FAMILY MEDICINE

## 2023-09-20 PROCEDURE — 99214 OFFICE O/P EST MOD 30 MIN: CPT | Mod: 25 | Performed by: FAMILY MEDICINE

## 2023-09-20 RX ORDER — FLUOXETINE 40 MG/1
CAPSULE ORAL
Qty: 90 CAPSULE | Refills: 0 | Status: CANCELLED | OUTPATIENT
Start: 2023-09-20

## 2023-09-20 RX ORDER — CITALOPRAM HYDROBROMIDE 20 MG/1
TABLET ORAL
Qty: 94 TABLET | Refills: 0 | Status: SHIPPED | OUTPATIENT
Start: 2023-09-27 | End: 2023-10-09

## 2023-09-20 ASSESSMENT — PATIENT HEALTH QUESTIONNAIRE - PHQ9
SUM OF ALL RESPONSES TO PHQ QUESTIONS 1-9: 3
SUM OF ALL RESPONSES TO PHQ QUESTIONS 1-9: 3
10. IF YOU CHECKED OFF ANY PROBLEMS, HOW DIFFICULT HAVE THESE PROBLEMS MADE IT FOR YOU TO DO YOUR WORK, TAKE CARE OF THINGS AT HOME, OR GET ALONG WITH OTHER PEOPLE: NOT DIFFICULT AT ALL

## 2023-09-20 ASSESSMENT — PAIN SCALES - GENERAL: PAINLEVEL: NO PAIN (0)

## 2023-09-20 NOTE — PROGRESS NOTES
Assessment & Plan   1. Major depressive disorder, recurrent episode, moderate (H)  Worsening of chronic condition. Uncontrolled on Prozac, will discontinue. Requesting to go back on Celexa. Medication filled as below. Referral given to therapy. Educated on stopping Prozac for 7 days before starting Celexa to taper down and avoid serotonin syndrome. No SI.   - citalopram (CELEXA) 20 MG tablet; Take 0.5 tablets (10 mg) by mouth daily for 7 days, THEN 1 tablet (20 mg) daily for 90 days.  Dispense: 94 tablet; Refill: 0  - [facility] Referral; Future    2. Need for immunization against influenza  - INFLUENZA VACCINE 65+ (FLUZONE HD)  - VACCINE ADMINISTRATION, INITIAL    3. Need for vaccination for pneumococcus  - PNEUMOCOCCAL 20 VALENT CONJUGATE (PREVNAR 20)  - IMMUNIATION ADMIN EACH ADDT'     Follow-up Visit   Expected date:  Oct 20, 2023 (Approximate)      Follow Up Appointment Details:     Follow-up with whom?: Me    Follow-Up for what?: Chronic Disease f/u    Chronic Disease f/u: Depression    How?: In Person    Is this an as-needed follow-up?: No                   Rajinder Hernández MD  [facility]    Disclaimer: This note consists of symbols derived from keyboarding, dictation and/or voice recognition software. As a result, there may be errors in the script that have gone undetected. Please consider this when interpreting information found in this chart.      Naseem Salazar is a 67 year old, presenting for the following health issues:  Anxiety and Depression        9/20/2023     1:43 PM   Additional Questions   Roomed by YK   Accompanied by self     History of Present Illness       Mental Health Follow-up:  Patient presents to follow-up on Depression.Patient's depression since last visit has been:  Better  The patient is not having other symptoms associated with depression.      Any significant life events: No  Patient is not feeling anxious or having panic  "attacks.  Patient has no concerns about alcohol or drug use.    He eats 0-1 servings of fruits and vegetables daily.He consumes 1 sweetened beverage(s) daily.He exercises with enough effort to increase his heart rate 30 to 60 minutes per day.  He exercises with enough effort to increase his heart rate 4 days per week.   He is taking medications regularly.    Patient presents to clinic for medication change request. Having significant anhedonia currently despite Prozac use. States he had been on Celexa for around 18 years and it was going well, but had been switched to Prozac 3 years ago due to acute distressing life circumstance/financial strain. He reports feeling better on Celexa and was without side effects when he was taking it. Patient is requesting to go back on Celexa. Denies SI.     Would like mental health counseling as having mental health support has helped with mood in the past.    Would like his Flu vaccine and pneumococcal vaccine today.     Review of Systems   Constitutional, HEENT, cardiovascular, pulmonary, GI and  systems are negative, except as otherwise noted.      Objective    /64   Pulse 55   Temp 98.5  F (36.9  C) (Temporal)   Resp 16   Ht 1.73 m (5' 8.11\")   Wt 75.1 kg (165 lb 8 oz)   SpO2 98%   BMI 25.08 kg/m    Body mass index is 25.08 kg/m .  Physical Exam   GENERAL: healthy, alert and no distress  RESP: lungs clear to auscultation - no rales, rhonchi or wheezes  CV: regular rate and rhythm, normal S1 S2, no S3 or S4, no murmur, click or rub, no peripheral edema and peripheral pulses strong  MS: no gross musculoskeletal defects noted, no edema  PSYCH: mentation appears down, depressed and frustrated affect. Denies SI        "

## 2023-09-20 NOTE — NURSING NOTE
Prior to immunization administration, verified patients identity using patient s name and date of birth. Please see Immunization Activity for additional information.     Screening Questionnaire for Adult Immunization    Are you sick today?   No   Do you have allergies to medications, food, a vaccine component or latex?   No   Have you ever had a serious reaction after receiving a vaccination?   No   Do you have a long-term health problem with heart, lung, kidney, or metabolic disease (e.g., diabetes), asthma, a blood disorder, no spleen, complement component deficiency, a cochlear implant, or a spinal fluid leak?  Are you on long-term aspirin therapy?   No   Do you have cancer, leukemia, HIV/AIDS, or any other immune system problem?   No   Do you have a parent, brother, or sister with an immune system problem?   No   In the past 3 months, have you taken medications that affect  your immune system, such as prednisone, other steroids, or anticancer drugs; drugs for the treatment of rheumatoid arthritis, Crohn s disease, or psoriasis; or have you had radiation treatments?   Yes   Have you had a seizure, or a brain or other nervous system problem?   No   During the past year, have you received a transfusion of blood or blood    products, or been given immune (gamma) globulin or antiviral drug?   No   For women: Are you pregnant or is there a chance you could become       pregnant during the next month?   No   Have you received any vaccinations in the past 4 weeks?   No     Immunization questionnaire was positive for at least one answer.  Notified provider.      Patient instructed to remain in clinic for 15 minutes afterwards, and to report any adverse reactions.     Screening performed by Deepika Bear on 9/20/2023 at 2:30 PM.

## 2023-10-09 ENCOUNTER — OFFICE VISIT (OUTPATIENT)
Dept: FAMILY MEDICINE | Facility: CLINIC | Age: 68
End: 2023-10-09
Attending: FAMILY MEDICINE
Payer: COMMERCIAL

## 2023-10-09 VITALS
OXYGEN SATURATION: 97 % | HEIGHT: 68 IN | BODY MASS INDEX: 25.23 KG/M2 | DIASTOLIC BLOOD PRESSURE: 70 MMHG | HEART RATE: 43 BPM | SYSTOLIC BLOOD PRESSURE: 138 MMHG | WEIGHT: 166.5 LBS | TEMPERATURE: 97.9 F

## 2023-10-09 DIAGNOSIS — F33.1 MAJOR DEPRESSIVE DISORDER, RECURRENT EPISODE, MODERATE (H): ICD-10-CM

## 2023-10-09 PROCEDURE — 99213 OFFICE O/P EST LOW 20 MIN: CPT | Performed by: FAMILY MEDICINE

## 2023-10-09 RX ORDER — CITALOPRAM HYDROBROMIDE 20 MG/1
20 TABLET ORAL DAILY
Qty: 90 TABLET | Refills: 1 | Status: SHIPPED | OUTPATIENT
Start: 2023-10-09 | End: 2024-03-06

## 2023-10-09 ASSESSMENT — ANXIETY QUESTIONNAIRES
3. WORRYING TOO MUCH ABOUT DIFFERENT THINGS: SEVERAL DAYS
4. TROUBLE RELAXING: NOT AT ALL
5. BEING SO RESTLESS THAT IT IS HARD TO SIT STILL: NOT AT ALL
GAD7 TOTAL SCORE: 3
2. NOT BEING ABLE TO STOP OR CONTROL WORRYING: SEVERAL DAYS
6. BECOMING EASILY ANNOYED OR IRRITABLE: SEVERAL DAYS
7. FEELING AFRAID AS IF SOMETHING AWFUL MIGHT HAPPEN: NOT AT ALL
1. FEELING NERVOUS, ANXIOUS, OR ON EDGE: NOT AT ALL
GAD7 TOTAL SCORE: 3
IF YOU CHECKED OFF ANY PROBLEMS ON THIS QUESTIONNAIRE, HOW DIFFICULT HAVE THESE PROBLEMS MADE IT FOR YOU TO DO YOUR WORK, TAKE CARE OF THINGS AT HOME, OR GET ALONG WITH OTHER PEOPLE: SOMEWHAT DIFFICULT

## 2023-10-09 ASSESSMENT — PAIN SCALES - GENERAL: PAINLEVEL: NO PAIN (0)

## 2023-10-09 ASSESSMENT — PATIENT HEALTH QUESTIONNAIRE - PHQ9
SUM OF ALL RESPONSES TO PHQ QUESTIONS 1-9: 1
SUM OF ALL RESPONSES TO PHQ QUESTIONS 1-9: 1
10. IF YOU CHECKED OFF ANY PROBLEMS, HOW DIFFICULT HAVE THESE PROBLEMS MADE IT FOR YOU TO DO YOUR WORK, TAKE CARE OF THINGS AT HOME, OR GET ALONG WITH OTHER PEOPLE: NOT DIFFICULT AT ALL

## 2023-10-09 NOTE — PROGRESS NOTES
Assessment & Plan   1. Major depressive disorder, recurrent episode, moderate (H)  Chronic stable condition.  PHQ-9 filled out today.  Medications refilled as below.  No SI, or significant side effects.  Follow-up in 6 months.  Sooner if worsening of symptoms.  Patient will alert our office if they have any medication changes, or they start taking supplements not prescribed by our office.  - citalopram (CELEXA) 20 MG tablet; Take 1 tablet (20 mg) by mouth daily  Dispense: 90 tablet; Refill: 1       Follow-up Visit   Expected date:  Apr 09, 2024 (Approximate)      Follow Up Appointment Details:     Follow-up with whom?: Me    Follow-Up for what?: Chronic Disease f/u    Chronic Disease f/u:  Anxiety  Depression       How?: In Person or Virtual    Is this an as-needed follow-up?: No                   Rajinder Hernández MD  [employer]    Disclaimer: This note consists of symbols derived from keyboarding, dictation and/or voice recognition software. As a result, there may be errors in the script that have gone undetected. Please consider this when interpreting information found in this chart.    Naseem Salazar is a 67 year old, presenting for the following health issues:   Follow Up, Depression, and Recheck Medication (Citalopram)        10/9/2023    10:23 AM   Additional Questions   Roomed by Abhilash ROBERT   Accompanied by Self         10/9/2023    10:23 AM   Patient Reported Additional Medications   Patient reports taking the following new medications None       History of Present Illness       Mental Health Follow-up:  Patient presents to follow-up on Depression.Patient's depression since last visit has been:  Better  The patient is not having other symptoms associated with depression.      Any significant life events: No  Patient is not feeling anxious or having panic attacks.  Patient has no concerns about alcohol or drug use.          Medication Followup of Citalopram  Taking  "Medication as prescribed: yes  Side Effects:  None  Medication Helping Symptoms:  yes    Celexa working great. No SI. Controlling all symptoms. Not as irritable, more self worth, enjoying activities again.    No other concerns.    Review of Systems   Constitutional, HEENT, cardiovascular, pulmonary, GI, , musculoskeletal, neuro, skin, endocrine and psych systems are negative, except as otherwise noted.      Objective    BP (!) 156/82 (BP Location: Left arm, Patient Position: Sitting, Cuff Size: Adult Regular)   Pulse (!) 43   Temp 97.9  F (36.6  C) (Temporal)   Ht 1.72 m (5' 7.72\")   Wt 75.5 kg (166 lb 8 oz)   SpO2 97%   BMI 25.53 kg/m    Body mass index is 25.53 kg/m .  Physical Exam   General: Appears well and in no acute distress.  Cardiovascular: Regular rate and rhythm, normal S1 and S2 without murmur. No extra heartsounds or friction rub.  Respiratory: Lungs clear to auscultation bilaterally. No wheezing or crackles.  Musculoskeletal: No gross extremity deformities. No peripheral edema. Normal muscle bulk.    Labs: none              "

## 2023-11-03 ENCOUNTER — VIRTUAL VISIT (OUTPATIENT)
Dept: PSYCHOLOGY | Facility: CLINIC | Age: 68
End: 2023-11-03
Payer: COMMERCIAL

## 2023-11-03 DIAGNOSIS — F33.1 MAJOR DEPRESSIVE DISORDER, RECURRENT EPISODE, MODERATE (H): ICD-10-CM

## 2023-11-03 PROCEDURE — 90834 PSYTX W PT 45 MINUTES: CPT | Mod: VID | Performed by: PSYCHOLOGIST

## 2023-11-03 ASSESSMENT — PATIENT HEALTH QUESTIONNAIRE - PHQ9
SUM OF ALL RESPONSES TO PHQ QUESTIONS 1-9: 5
10. IF YOU CHECKED OFF ANY PROBLEMS, HOW DIFFICULT HAVE THESE PROBLEMS MADE IT FOR YOU TO DO YOUR WORK, TAKE CARE OF THINGS AT HOME, OR GET ALONG WITH OTHER PEOPLE: SOMEWHAT DIFFICULT
SUM OF ALL RESPONSES TO PHQ QUESTIONS 1-9: 5

## 2023-11-06 NOTE — PROGRESS NOTES
[facility]   Mental Health & Addiction Services     Progress Note - Initial Visit    Patient  Name:  Martin Armstrong Date: 11/3/23         Service Type: Individual     Visit Start Time: 9:02am Visit End Time: 9:48am    Visit #: 1 46 minutes    Attendees: Client attended alone    Service Modality:  Video Visit:      Provider verified identity through the following two step process.  Patient provided:  Patient     Telemedicine Visit: The patient's condition can be safely assessed and treated via synchronous audio and visual telemedicine encounter.      Reason for Telemedicine Visit: Services only offered telehealth    Originating Site (Patient Location): Patient's home    Distant Site (Provider Location): [facility]: [facility]    Consent:  The patient/guardian has verbally consented to: the potential risks and benefits of telemedicine (video visit) versus in person care; bill my insurance or make self-payment for services provided; and responsibility for payment of non-covered services.     Patient would like the video invitation sent by:  Send to e-mail at: [email or web address]    Mode of Communication:  Video Conference via Amwell    Distant Location (Provider):  On-site    As the provider I attest to compliance with applicable laws and regulations related to telemedicine.       DATA:   Interactive Complexity: No   Crisis: No     Presenting Concerns/  Current Stressors:   Client is experiencing increased depressive symptoms.      ASSESSMENT:  Mental Status Assessment:  Appearance:   Appropriate   Eye Contact:   Fair   Psychomotor Behavior: Normal   Attitude:   Cooperative  Pleasant  Orientation:   All  Speech   Rate / Production: Normal/ Responsive   Volume:  Normal   Mood:    Normal Sad   Affect:    Appropriate   Thought Content:  Clear   Thought Form:  Coherent   Insight:    Good  and Fair       Safety Issues and Plan for Safety and Risk Management:   Joseph  "Suicide Severity Rating Scale (Lifetime/Recent)      3/12/2020    12:00 PM 11/3/2023     9:34 AM   [place] Suicide Severity Rating (Lifetime/Recent)   Q1 Wish to be Dead (Lifetime) No    Q2 Non-Specific Active Suicidal Thoughts (Lifetime) Yes    Non-Specific Active Suicidal Thought Description (Lifetime) Passive: \"Don't want to be around\"    Most Severe Ideation Rating (Lifetime) 3    Frequency (Lifetime) 4    Duration (Lifetime) 2    Controllability (Lifetime) 3    Protective Factors  (Lifetime) 1    Reasons for Ideation (Lifetime) 4    RETIRED: 1. Wish to be Dead (Recent) No    RETIRED: 2. Non-Specific Active Suicidal Thoughts (Recent) No    3. Active Suicidal Ideation with any Methods (Not Plan) Without Intent to Act (Lifetime) No    RETIRED: 3. Active Suicidal Ideation with any Methods (Not Plan) Without Intent to Act (Recent) No    RETIRE: 4. Active Suicidal Ideation with Some Intent to Act, Without Specific Plan (Lifetime) No    4. Active Suicidal Ideation with Some Intent to Act, Without Specific Plan (Recent) No    RETIRE: 5. Active Suicidal Ideation with Specific Plan and Intent (Lifetime) No    RETIRED: 5. Active Suicidal Ideation with Specific Plan and Intent (Recent) No    Most Severe Ideation Rating (Past Month) NA    Frequency (Past Month) NA    Duration (Past Month) NA    Controllability (Past Month) NA    Protective Factors (Past Month) NA    Reasons for Ideation (Past Month) NA    Actual Attempt (Lifetime) No    Actual Attempt (Past 3 Months) No    Has subject engaged in non-suicidal self-injurious behavior? (Lifetime) No    Has subject engaged in non-suicidal self-injurious behavior? (Past 3 Months) No    Interrupted Attempts (Lifetime) No    Interrupted Attempts (Past 3 Months) No    Aborted or Self-Interrupted Attempt (Lifetime) No    Aborted or Self-Interrupted Attempt (Past 3 Months) No    Preparatory Acts or Behavior (Lifetime) No    Preparatory Acts or Behavior (Past 3 Months) No    Q1 Wish " "to be Dead (Lifetime)  N   1. Wish to be Dead (Past 1 Month)  N   Q2 Non-Specific Active Suicidal Thoughts (Lifetime)  Y   Non-Specific Active Suicidal Thought Description (Lifetime)  MRE: \"Had a thought 22 years ago that ZI could drive off the road.  I went into town and called my EAT.  That was  right after my son .\"   2. Non-Specific Active Suicidal Thoughts (Past 1 Month)  N   3. Active Suicidal Ideation with any Methods (Not Plan) Without Intent to Act (Lifetime)  N   Q4 Active Suicidal Ideation with Some Intent to Act, Without Specific Plan (Lifetime)  N   Q5 Active Suicidal Ideation with Specific Plan and Intent (Lifetime)  N   Most Severe Ideation Rating (Lifetime)  1   Frequency (Lifetime)  1   Duration (Lifetime)  1   Controllability (Lifetime)  1   Deterrents (Lifetime)  1   Deterrents (Past 1 Month)  0   Reasons for Ideation (Lifetime)  5   Reasons for Ideation (Past 1 Month)  0   Actual Attempt (Lifetime)  N   Has subject engaged in non-suicidal self-injurious behavior? (Lifetime)  N   Interrupted Attempts (Lifetime)  N   Aborted or Self-Interrupted Attempt (Lifetime)  N   Preparatory Acts or Behavior (Lifetime)  N   Calculated C-SSRS Risk Score (Lifetime/Recent)  No Risk Indicated     Patient denies current fears or concerns for personal safety.  Patient denies current or recent suicidal ideation or behaviors.  Patient denies current or recent homicidal ideation or behaviors.  Patient denies current or recent self injurious behavior or ideation.  Patient denies other safety concerns.  Recommended that patient call 911 or go to the local ED should there be a change in any of these risk factors.  Patient reports there are no firearms in the house.     Diagnostic Criteria:  Major Depressive Disorder  CRITERIA (A-C) REPRESENT A MAJOR DEPRESSIVE EPISODE - SELECT THESE CRITERIA  A) Recurrent episode(s) - symptoms have been present during the same 2-week period and represent a change from previous " functioning 5 or more symptoms (required for diagnosis)   - Depressed mood. Note: In children and adolescents, can be irritable mood.     - Diminished interest or pleasure in all, or almost all, activities.    - Psychomotor activity retardation.    - Fatigue or loss of energy.    - Diminished ability to think or concentrate, or indecisiveness.   B) The symptoms cause clinically significant distress or impairment in social, occupational, or other important areas of functioning  C) The episode is not attributable to the physiological effects of a substance or to another medical condition  D) The occurence of major depressive episode is not better explained by other thought / psychotic disorders  E) There has never been a manic episode or hypomanic episode      DSM5 Diagnoses: (Sustained by DSM5 Criteria Listed Above)  Diagnoses: 296.32 (F33.1) Major Depressive Disorder, Recurrent Episode, Moderate With melancholic features  Psychosocial & Contextual Factors: Client is retired and lives with his wife.  Assessments completed prior to visit:  The following assessments were completed by patient for this visit:  PHQ9:       7/12/2021     8:29 AM 12/7/2021     8:24 AM 4/28/2022    12:55 PM 1/11/2023     3:03 PM 9/20/2023     1:28 PM 10/9/2023    10:12 AM 11/3/2023     8:44 AM   PHQ-9 SCORE   PHQ-9 Total Score MyChart 5 (Mild depression)  4 (Minimal depression) 8 (Mild depression) 3 (Minimal depression) 1 (Minimal depression) 5 (Mild depression)   PHQ-9 Total Score 5 3 4    4 8 3 1 5       WHODAS 2.0 (12 item):       3/12/2020    12:00 PM   WHODAS 2.0 Total Score   Total Score 23     Intervention:   Psychoeducation; Skill review/identification & recommendation; Pattern recognition; Socratic Questioning; Active listening, validation, and other rapport building skills; Administer and explain screeners; Answered questions; Scheduled future appointment; Started discussion goals for therapy.  Collateral Reports Completed:  Not  Applicable      PLAN: (Homework, other):  1. Provider will continue Diagnostic Assessment.  Patient was given the following to do until next session:  None at this time.    2. Provider recommended the following referrals: None at this time.      3.  Suicide Risk and Safety Concerns were assessed for Martin Armstrong.    Patient meets the following risk assessment and triage: Patient denied any current/recent/lifetime history of suicidal ideation and/or behaviors.  No safety plan indicated at this time.       Dimitris Dickey PsyD  November 6, 2023

## 2024-02-05 DIAGNOSIS — E03.9 HYPOTHYROIDISM, UNSPECIFIED TYPE: ICD-10-CM

## 2024-02-05 DIAGNOSIS — I10 BENIGN ESSENTIAL HYPERTENSION: ICD-10-CM

## 2024-02-05 NOTE — LETTER
February 12, 2024      Martin Armstrong  [address] [facility] [address]                  Good Afternoon,      Your provider has sent a baljit refill for your hydrochlorothiazide (HYDRODIURIL) and levothyroxine (SYNTHROID/LEVOTHROID). You are due for a Nurse/medical assistant only and a lab visit before any more refills will be sent.      Please schedule via [employer] or call [phone].      Have a good day,      [facility]

## 2024-02-06 RX ORDER — LEVOTHYROXINE SODIUM 112 UG/1
112 TABLET ORAL DAILY
Qty: 30 TABLET | Refills: 0 | Status: SHIPPED | OUTPATIENT
Start: 2024-02-06 | End: 2024-03-04

## 2024-02-06 RX ORDER — HYDROCHLOROTHIAZIDE 12.5 MG/1
12.5 TABLET ORAL DAILY
Qty: 30 TABLET | Refills: 0 | Status: SHIPPED | OUTPATIENT
Start: 2024-02-06 | End: 2024-03-04

## 2024-02-07 NOTE — TELEPHONE ENCOUNTER
Needs MA and lab visits. Nicolette given. Schedule.    Rajinder Hernández MD  [facility]

## 2024-02-08 NOTE — TELEPHONE ENCOUNTER
Staff sent [employer] message to notify patient of required appointment for further medication refills.

## 2024-02-26 ENCOUNTER — TELEPHONE (OUTPATIENT)
Dept: FAMILY MEDICINE | Facility: CLINIC | Age: 69
End: 2024-02-26

## 2024-02-26 NOTE — TELEPHONE ENCOUNTER
Reason for Call:  Appointment Request    Patient requesting this type of appt:  Med Check     Requested provider: Rajinder Hernández    Reason patient unable to be scheduled: Not within requested timeframe    When does patient want to be seen/preferred time:  Anytime before March 16th     Comments: He needs to come in for a med check . He would like to get in  anytime before March 7th.     Could we send this information to you in [employer] [place] [employer] [place] or would you prefer to receive a phone call?:   Patient would prefer a phone call   Okay to leave a detailed message?: Yes at Cell number on file:    Telephone Information:   Mobile [phone]       Call taken on 2/26/2024 at 8:20 AM by Maude Yanez

## 2024-02-29 NOTE — TELEPHONE ENCOUNTER
Left message for patient to return call. Per patient's voicemail message, he is currently in the \[facility] [place] [facility]\"".

## 2024-03-04 ENCOUNTER — TELEPHONE (OUTPATIENT)
Dept: FAMILY MEDICINE | Facility: CLINIC | Age: 69
End: 2024-03-04

## 2024-03-04 DIAGNOSIS — E03.9 HYPOTHYROIDISM, UNSPECIFIED TYPE: ICD-10-CM

## 2024-03-04 DIAGNOSIS — I10 BENIGN ESSENTIAL HYPERTENSION: ICD-10-CM

## 2024-03-04 RX ORDER — HYDROCHLOROTHIAZIDE 12.5 MG/1
12.5 TABLET ORAL DAILY
Qty: 30 TABLET | Refills: 0 | Status: SHIPPED | OUTPATIENT
Start: 2024-03-04 | End: 2024-03-06

## 2024-03-04 RX ORDER — LEVOTHYROXINE SODIUM 112 UG/1
112 TABLET ORAL DAILY
Qty: 30 TABLET | Refills: 0 | Status: SHIPPED | OUTPATIENT
Start: 2024-03-04 | End: 2024-03-06

## 2024-03-04 NOTE — TELEPHONE ENCOUNTER
Patient is calling and stated he needed to schedule a visit with PCP for refills.  Scheduled patient for 4/5/2024.  Patient informed to arrive to the clinic 20 minutes prior to scheduled visit.  Patient stated understanding.    Mali Rosas RN

## 2024-03-04 NOTE — TELEPHONE ENCOUNTER
Patient calling stating he got cut off on the phone while making an appointment. Appointment was scheduled for 4/5. Patient needing medication refill til then.   Michelle Mayo RN on 3/4/2024 at 9:22 AM

## 2024-03-04 NOTE — TELEPHONE ENCOUNTER
PRIMARY CARE CLINIC FOLLOW UP VISIT  Chief Complaint   Patient presents with   • Diabetes     History of Present Illness     Diabetes mellitus without complication (HCC)  Elizabeth didn't tolerate trulicity and when she started lantus at first it wasn't covered but now she has a coupon for which it's $0 co-pay for 2 years. She is taking lantus 15 units at night. Fasting sugars have ranged 111, 165, 188. Her post-prandial sugars are typically > 200. Her  has also been diagnosed with presumed lung cancer which is stressing her out.     Current Outpatient Prescriptions   Medication Sig Dispense Refill   • simvastatin (ZOCOR) 40 MG Tab TAKE 1 TABLET BY MOUTH EVERY DAY IN THE EVENING 90 Tab 0   • insulin glargine (LANTUS) 100 UNIT/ML Solution Pen-injector injection Inject 15 Units as instructed every evening. (Patient taking differently: Inject 20 Units as instructed every evening.) 5 PEN 0   • Insulin Pen Needle 1 Units by Does not apply route every day. 90 Each 3   • losartan-hydrochlorothiazide (HYZAAR) 100-25 MG per tablet TAKE 1 TABLET BY MOUTH EVERY DAY 90 Tab 0   • glipiZIDE (GLUCOTROL) 10 MG Tab TAKE 1 TABLET BY MOUTH TWICE A  Tab 0   • amLODIPine (NORVASC) 5 MG Tab TAKE 1 TABLET BY MOUTH EVERY DAY 90 Tab 0   • metformin (GLUCOPHAGE) 1000 MG tablet TAKE 1 TAB BY MOUTH 2 TIMES A DAY, WITH MEALS. 180 Tab 0   • glucose blood strip USE DAILY AND AS NEEDED FOR HIGH OR LOW SUGAR.  1   • Blood Glucose Monitoring Suppl Supplies Misc Test strips order: Test strips for insurance approved meter. Sig: use daily and prn ssx high or low sugar #100 RF x 3 100 Units 1   • Lancets Misc Lancets order: Lancets for insurance approved meter meter. Sig: use daily and prn ssx high or low sugar. #100 RF x 3 100 Each 1   • Cholecalciferol (VITAMIN D PO) Take  by mouth.     • naproxen (NAPROSYN) 500 MG Tab TAKE 1 TAB BY MOUTH 2 TIMES DAILY WITH FOOD  0   • Blood Glucose Monitoring Suppl Device Meter: Dispense Device of Insurance  Spoke to patient and scheduled appointment. Patient also requested Medicare physical at the same time    Next 5 appointments (look out 90 days)      Mar 04, 2024  2:00 PM  MA Visit with ER JENN COTA/BLU  [facility] ([facility] ) [address]  [place] [address]  [phone]     Mar 06, 2024  3:30 PM  (Arrive by 3:10 PM)  Annual Wellness Visit with Rajinder Hernández MD  [facility] [place] ([facility] ) [address], [address]  [place] [place] [id number] [phone]             Preference. Sig. Use as directed for blood sugar monitoring. #1. NR. 1 Device 0   • fluticasone (FLONASE) 50 MCG/ACT nasal spray Spray 1 Spray in nose every day. 16 g 1     No current facility-administered medications for this visit.      Past Medical History:   Diagnosis Date   • Allergic rhinitis    • Dalton's esophagus 2017   • Dental disorder     partial upper   • Diabetes mellitus without complication (HCC) 2015   • Dyslipidemia    • Essential hypertension 2015   • GERD (gastroesophageal reflux disease) 2009   • HTN    • Low back pain    • Migraine headache      Past Surgical History:   Procedure Laterality Date   • ROTATOR CUFF REPAIR Left 2017   • OREN BY LAPAROSCOPY Bilateral 8/3/2016    Procedure: OREN BY LAPAROSCOPY;  Surgeon: Scott Canales M.D.;  Location: SURGERY SAME DAY [facility];  Service:    • KNEE ARTHROSCOPY  2014    Performed by Wiley Kenyon M.D. at SURGERY [facility]S   • MEDIAL MENISCECTOMY  2014    Performed by Wiley Kenyon M.D. at SURGERY [facility] ORS   • SEPTOTURBINOPLASTY  3/30/2010    Performed by TRAV MENJIVAR at SURGERY SAME DAY [facility] ORS   • SIGMOIDOSCOPY FLEX  3/10    normal   • COLONOSCOPY  3/09    normal   • ANKLE ORIF     • APPENDECTOMY     • ABDOMINAL HYSTERECTOMY TOTAL      partial due to fibroids     Social History   Substance Use Topics   • Smoking status: Former Smoker     Packs/day: 0.50     Years: 20.00     Types: Cigarettes     Quit date: 2006   • Smokeless tobacco: Never Used      Comment: 5 cig's a day for 20 yrs quit in    • Alcohol use 0.0 oz/week      Comment: 2 per month,  beer     Social History     Social History Narrative     at [employer]     Family History   Problem Relation Age of Onset   • Hypertension Mother    • Cancer Mother 85        colon   • Hypertension Father    • Cancer Father         prostate     Family Status   Relation Status   • Mo  at age 96 y.o.        pneumonia   • Fa  "Alive   • Sis Alive   • Sis Alive     Allergies: Patient has no known allergies.    ROS  As per HPI above. All other systems reviewed and negative.        Objective   /64   Pulse 80   Temp 36.3 °C (97.4 °F)   Ht 1.651 m (5' 5\")   Wt 68 kg (150 lb)   SpO2 98%  Body mass index is 24.96 kg/m².    General: alert and oriented, pleasant, cooperative  HEENT: Normocephalic, atraumatic.     Assessment and Plan   The following treatment plan was discussed     1. Diabetes mellitus without complication (HCC)  Her most recent A1c was 10.9%, advised to increase lantus from 15 to 20 units at night and increase until fasting sugars averaging around 100. Follow up with me and DM RN in 2 months.       Healthcare maintenance     Health Maintenance Due   Topic Date Due   • HEPATITIS C SCREENING  1956       Return in about 2 months (around 8/21/2019) for DM RN and me .    Nehemias Giles MD  Internal Medicine  [facility]                   "

## 2024-03-06 ENCOUNTER — OFFICE VISIT (OUTPATIENT)
Dept: FAMILY MEDICINE | Facility: CLINIC | Age: 69
End: 2024-03-06
Payer: COMMERCIAL

## 2024-03-06 VITALS
HEART RATE: 85 BPM | RESPIRATION RATE: 16 BRPM | SYSTOLIC BLOOD PRESSURE: 158 MMHG | TEMPERATURE: 98.2 F | BODY MASS INDEX: 28.17 KG/M2 | OXYGEN SATURATION: 95 % | DIASTOLIC BLOOD PRESSURE: 76 MMHG | WEIGHT: 179.5 LBS | HEIGHT: 67 IN

## 2024-03-06 DIAGNOSIS — F11.21 OPIOID DEPENDENCE IN REMISSION (H): ICD-10-CM

## 2024-03-06 DIAGNOSIS — I10 BENIGN ESSENTIAL HYPERTENSION: ICD-10-CM

## 2024-03-06 DIAGNOSIS — Z13.220 LIPID SCREENING: ICD-10-CM

## 2024-03-06 DIAGNOSIS — E03.9 HYPOTHYROIDISM, UNSPECIFIED TYPE: ICD-10-CM

## 2024-03-06 DIAGNOSIS — Z00.00 ENCOUNTER FOR MEDICARE ANNUAL WELLNESS EXAM: Primary | ICD-10-CM

## 2024-03-06 DIAGNOSIS — Z12.5 SCREENING FOR PROSTATE CANCER: ICD-10-CM

## 2024-03-06 DIAGNOSIS — F33.1 MAJOR DEPRESSIVE DISORDER, RECURRENT EPISODE, MODERATE (H): ICD-10-CM

## 2024-03-06 LAB
ERYTHROCYTE [DISTWIDTH] IN BLOOD BY AUTOMATED COUNT: 13.3 % (ref 10–15)
HCT VFR BLD AUTO: 37 % (ref 40–53)
HGB BLD-MCNC: 11.9 G/DL (ref 13.3–17.7)
MCH RBC QN AUTO: 25.9 PG (ref 26.5–33)
MCHC RBC AUTO-ENTMCNC: 32.2 G/DL (ref 31.5–36.5)
MCV RBC AUTO: 80 FL (ref 78–100)
PLATELET # BLD AUTO: 211 10E3/UL (ref 150–450)
RBC # BLD AUTO: 4.6 10E6/UL (ref 4.4–5.9)
WBC # BLD AUTO: 7.3 10E3/UL (ref 4–11)

## 2024-03-06 PROCEDURE — 80053 COMPREHEN METABOLIC PANEL: CPT | Performed by: FAMILY MEDICINE

## 2024-03-06 PROCEDURE — G0439 PPPS, SUBSEQ VISIT: HCPCS | Performed by: FAMILY MEDICINE

## 2024-03-06 PROCEDURE — 84443 ASSAY THYROID STIM HORMONE: CPT | Performed by: FAMILY MEDICINE

## 2024-03-06 PROCEDURE — 36415 COLL VENOUS BLD VENIPUNCTURE: CPT | Performed by: FAMILY MEDICINE

## 2024-03-06 PROCEDURE — G0103 PSA SCREENING: HCPCS | Performed by: FAMILY MEDICINE

## 2024-03-06 PROCEDURE — 99214 OFFICE O/P EST MOD 30 MIN: CPT | Mod: 25 | Performed by: FAMILY MEDICINE

## 2024-03-06 PROCEDURE — 80061 LIPID PANEL: CPT | Performed by: FAMILY MEDICINE

## 2024-03-06 PROCEDURE — 85027 COMPLETE CBC AUTOMATED: CPT | Performed by: FAMILY MEDICINE

## 2024-03-06 RX ORDER — CITALOPRAM HYDROBROMIDE 20 MG/1
20 TABLET ORAL DAILY
Qty: 90 TABLET | Refills: 3 | Status: SHIPPED | OUTPATIENT
Start: 2024-03-06

## 2024-03-06 RX ORDER — HYDROCHLOROTHIAZIDE 12.5 MG/1
12.5 TABLET ORAL DAILY
Qty: 90 TABLET | Refills: 3 | Status: SHIPPED | OUTPATIENT
Start: 2024-03-06

## 2024-03-06 RX ORDER — LEVOTHYROXINE SODIUM 112 UG/1
112 TABLET ORAL DAILY
Qty: 90 TABLET | Refills: 3 | Status: SHIPPED | OUTPATIENT
Start: 2024-03-06

## 2024-03-06 SDOH — HEALTH STABILITY: PHYSICAL HEALTH: ON AVERAGE, HOW MANY DAYS PER WEEK DO YOU ENGAGE IN MODERATE TO STRENUOUS EXERCISE (LIKE A BRISK WALK)?: 3 DAYS

## 2024-03-06 SDOH — HEALTH STABILITY: PHYSICAL HEALTH: ON AVERAGE, HOW MANY MINUTES DO YOU ENGAGE IN EXERCISE AT THIS LEVEL?: 20 MIN

## 2024-03-06 ASSESSMENT — PATIENT HEALTH QUESTIONNAIRE - PHQ9
SUM OF ALL RESPONSES TO PHQ QUESTIONS 1-9: 3
10. IF YOU CHECKED OFF ANY PROBLEMS, HOW DIFFICULT HAVE THESE PROBLEMS MADE IT FOR YOU TO DO YOUR WORK, TAKE CARE OF THINGS AT HOME, OR GET ALONG WITH OTHER PEOPLE: NOT DIFFICULT AT ALL
SUM OF ALL RESPONSES TO PHQ QUESTIONS 1-9: 3

## 2024-03-06 ASSESSMENT — SOCIAL DETERMINANTS OF HEALTH (SDOH): HOW OFTEN DO YOU GET TOGETHER WITH FRIENDS OR RELATIVES?: ONCE A WEEK

## 2024-03-06 ASSESSMENT — PAIN SCALES - GENERAL: PAINLEVEL: NO PAIN (0)

## 2024-03-06 NOTE — RESULT ENCOUNTER NOTE
Ezekiel Salazar,    If you have not viewed these results on [employer] within 3 days, we will use an alternative method to contact you. We will contact you via the following protocol:    - Via letter if your results are normal.  - Via phone ([phone]) if your results are abnormal.     Here are my comments about your recent results:    CBC Results - Your cell counts were stable.     Your other labs are pending.    Please call the clinic ([phone]), or message us on Vinted with any questions you may have.     Have a great day,    Dr. Merchant

## 2024-03-06 NOTE — PROGRESS NOTES
Preventive Care Visit  [facility] AZIZA Hernández MD, Family Medicine  Mar 6, 2024    Assessment & Plan   1. Encounter for Medicare annual wellness exam  Discussed personal health and safety. Routine screenings ordered as below. Appropriate anticipatory guidance, vaccinations, and health screening recommendations delivered according to the USPSTF and other appropriate society guidelines. Martin reports understanding and in agreement with this mutually agreed upon.    Today's Orders:  - TSH with free T4 reflex; Future  - Lipid panel reflex to direct LDL Non-fasting; Future  - Prostate Specific Antigen Screen; Future  - CBC with platelets; Future  - Comprehensive metabolic panel; Future  - Lipid panel reflex to direct LDL Non-fasting  - Prostate Specific Antigen Screen  - CBC with platelets  - Comprehensive metabolic panel    2. Major depressive disorder, recurrent episode, moderate (H)  Chronic stable condition.  PHQ-9 filled out today.  Medication refills given.  No SI, or significant side effects.  Follow-up in 6 months. Sooner if worsening of symptoms.  Patient will alert our office if they have any medication changes, or they start taking supplements not prescribed by our office.        10/9/2023    10:12 AM 11/3/2023     8:44 AM 3/6/2024     3:14 PM   PHQ   PHQ-9 Total Score 1 5 3   Q9: Thoughts of better off dead/self-harm past 2 weeks Not at all Not at all Not at all      Today's Orders:  - citalopram (CELEXA) 20 MG tablet; Take 1 tablet (20 mg) by mouth daily  Dispense: 90 tablet; Refill: 3    3. Benign essential hypertension  Uncontrolled today. Usually high and improves on repeat, but not today. Will send me readings in 2 weeks from home. If still high, increase hydrochlorothiazide to 25 mg daily and then repeat labs and ma visit in 2 weeks.  - hydroCHLOROthiazide 12.5 MG tablet; Take 1 tablet (12.5 mg) by mouth daily  Dispense: 90 tablet; Refill: 3  - Comprehensive metabolic  panel; Future  - Comprehensive metabolic panel    4. Hypothyroidism, unspecified type  Symptoms stable/controlled. Medication monitoring labs ordered/reviewed. Tolerating pharmacotherapy well. Continue current regiment. Medication refilled per orders as below.   - levothyroxine (SYNTHROID/LEVOTHROID) 112 MCG tablet; Take 1 tablet (112 mcg) by mouth daily  Dispense: 90 tablet; Refill: 3  - TSH with free T4 reflex; Future  - TSH with free T4 reflex    5. Opioid dependence in remission (H)  Follow-up with addiction medicine.    6. Screening for prostate cancer  Patient elects to undergo PSA screening after informed decision making process. This discussion with the patient included reviewing the benefits of testing such as: Ability to easily add on to existing blood work, screening for a common cancer in men which has treatment options and otherwise may have been silent, and possibility for early detection to prevent morbidity from future metastasis and/or death. Additionally, risks were discussed including possibility of false positive testing (leading to anxiety and further unnecessary testing/biopsies), possibility of over treating a cancer that may not affect a man in his lifetime, and the side effects of the current treatment options for prosate cancer (urinary incontinence, ED, etc).  - Prostate Specific Antigen Screen; Future  - Prostate Specific Antigen Screen    7. Lipid screening  - Lipid panel reflex to direct LDL Non-fasting; Future  - Lipid panel reflex to direct LDL Non-fasting       Follow-up Visit   Expected date:  Mar 13, 2025 (Approximate)      Follow Up Appointment Details:     Follow-up with whom?: PCP    Follow-Up for what?: Medicare Wellness    Welcome or Annual?: Annual Wellness    How?: In Person                   Rajinder Hernández MD  [facility]    Disclaimer: This note consists of symbols derived from keyboarding, dictation and/or voice recognition software.  As a result, there may be errors in the script that have gone undetected. Please consider this when interpreting information found in this chart.    Naseem Salazar is a 68 year old, presenting for the following:  Physical      Health Care Directive  Patient does not have a Health Care Directive or Living Will: Discussed advance care planning with patient; however, patient declined at this time.    HPI  Celexa working well. No SI.     Monitoring lung consolidation with pulmonology. Upcoming follow-up CT scan this Friday.    No new meds or supplements.    Not checking home bp readings.    Answers submitted by the patient for this visit:  Patient Health Questionnaire (Submitted on 3/6/2024)  If you checked off any problems, how difficult have these problems made it for you to do your work, take care of things at home, or get along with other people?: Not difficult at all  PHQ9 TOTAL SCORE: 3    Hypertension Follow-up    Do you check your blood pressure regularly outside of the clinic? No   Are you following a low salt diet? Yes  Are your blood pressures ever more than 140 on the top number (systolic) OR more   than 90 on the bottom number (diastolic), for example 140/90? N/A    Depression Followup  How are you doing with your depression since your last visit? Improved   Are you having other symptoms that might be associated with depression? No  Have you had a significant life event?  No   Are you feeling anxious or having panic attacks?   No  Do you have any concerns with your use of alcohol or other drugs? No    Social History     Tobacco Use    Smoking status: Never    Smokeless tobacco: Never   Vaping Use    Vaping Use: Never used   Substance Use Topics    Alcohol use: No    Drug use: No     Comment: h/o chem dep due to post -op analgesics 1-06         10/9/2023    10:12 AM 11/3/2023     8:44 AM 3/6/2024     3:14 PM   PHQ   PHQ-9 Total Score 1 5 3   Q9: Thoughts of better off dead/self-harm past 2 weeks Not at  all Not at all Not at all         1/11/2023     3:04 PM 10/9/2023    10:13 AM 11/3/2023     9:22 AM   MINA-7 SCORE   Total Score 4 (minimal anxiety) 3 (minimal anxiety)    Total Score 4 3 2         3/6/2024     3:14 PM   Last PHQ-9   1.  Little interest or pleasure in doing things 1   2.  Feeling down, depressed, or hopeless 0   3.  Trouble falling or staying asleep, or sleeping too much 0   4.  Feeling tired or having little energy 1   5.  Poor appetite or overeating 0   6.  Feeling bad about yourself 1   7.  Trouble concentrating 0   8.  Moving slowly or restless 0   Q9: Thoughts of better off dead/self-harm past 2 weeks 0   PHQ-9 Total Score 3     Hypothyroidism Follow-up    Since last visit, patient describes the following symptoms: weight gain of 10 lbs      3/6/2024   General Health   How would you rate your overall physical health? (!) FAIR   Feel stress (tense, anxious, or unable to sleep) Not at all         3/6/2024   Nutrition   Diet: Regular (no restrictions)         3/6/2024   Exercise   Days per week of moderate/strenous exercise 3 days   Average minutes spent exercising at this level 20 min         3/6/2024   Social Factors   Frequency of gathering with friends or relatives Once a week   Worry food won't last until get money to buy more No   Food not last or not have enough money for food? No   Do you have housing?  Yes   Are you worried about losing your housing? No   Lack of transportation? No   Unable to get utilities (heat,electricity)? No         3/6/2024   Activities of Daily Living- Home Safety   Needs help with the following daily activites None of the above   Safety concerns in the home None of the above         3/6/2024   Dental   Dentist two times every year? (!) NO         3/6/2024   Hearing Screening   Hearing concerns? None of the above         3/6/2024   Driving Risk Screening   Patient/family members have concerns about driving No         3/6/2024   General Alertness/Fatigue Screening    Have you been more tired than usual lately? No         3/6/2024   Urinary Incontinence Screening   Bothered by leaking urine in past 6 months No          Today's PHQ-9 Score:       3/6/2024     3:14 PM   PHQ-9 SCORE   PHQ-9 Total Score MyChart 3 (Minimal depression)   PHQ-9 Total Score 3         3/6/2024   Substance Use   Alcohol more than 3/day or more than 7/wk Not Applicable   Do you have a current opioid prescription? No   How severe/bad is pain from 1 to 10? 3/10   Do you use any other substances recreationally? No     Social History     Tobacco Use    Smoking status: Never    Smokeless tobacco: Never   Vaping Use    Vaping Use: Never used   Substance Use Topics    Alcohol use: No    Drug use: No     Comment: h/o chem dep due to post -op analgesics 1-06         3/6/2024   AAA Screening   Family history of Abdominal Aortic Aneurysm (AAA)? No   Last PSA:   PSA   Date Value Ref Range Status   02/11/2021 0.22 0 - 4 ug/L Final     Comment:     Assay Method:  Chemiluminescence using Siemens Vista analyzer     Prostate Specific Antigen Screen   Date Value Ref Range Status   01/11/2023 0.28 0.00 - 4.00 ug/L Final     ASCVD Risk   The 10-year ASCVD risk score (Esme POLANCO, et al., 2019) is: 21.5%    Values used to calculate the score:      Age: 68 years      Sex: Male      Is Non- : No      Diabetic: No      Tobacco smoker: No      Systolic Blood Pressure: 158 mmHg      Is BP treated: Yes      HDL Cholesterol: 44 mg/dL      Total Cholesterol: 149 mg/dL    Reviewed and updated as needed this visit by Provider   Tobacco  Allergies  Meds  Problems  Med Hx  Surg Hx  Fam Hx        Social Hx Reviewed    Past Medical History:   Diagnosis Date    Chemical dependency (H)     remission- 2008, suboxone    Diverticulosis     DJD (degenerative joint disease) of knee     Thyroid disease      Past Surgical History:   Procedure Laterality Date    ARTHROPLASTY KNEE UNICOMPARTMENT  11/8/2011     "Procedure:ARTHROPLASTY KNEE UNICOMPARTMENT; LEFT KNEE UNICOMPARTMENT ARTHROPLASTY (FREEMAN)^; Surgeon:SHABBIR RADER; Location:[facility] OR    ARTHROSCOPY KNEE RT/LT  1/2006    left    COLONOSCOPY  3/27/08    due in 2013    COLONOSCOPY WITH CO2 INSUFFLATION N/A 11/5/2018    Procedure: screening colonoscopy;  Surgeon: Adrian Hawthorne MD;  Location: [facility] OR     Current providers sharing in care for this patient include:  Patient Care Team:  Rajinder Hernández MD as PCP - General (Family Medicine)  Rajinder Hernández MD as Assigned PCP  Dimitris Dickey PsyD as Assigned Behavioral Health Provider    The following health maintenance items are reviewed in Epic and correct as of today:  Health Maintenance   Topic Date Due    RSV VACCINE (Pregnancy & 60+) (1 - 1-dose 60+ series) Never done    ZOSTER IMMUNIZATION (2 of 2) 04/08/2021    TSH W/FREE T4 REFLEX  01/11/2024    PHQ-9  09/06/2024    MINA ASSESSMENT  11/03/2024    MEDICARE ANNUAL WELLNESS VISIT  03/06/2025    ANNUAL REVIEW OF HM ORDERS  03/06/2025    FALL RISK ASSESSMENT  03/06/2025    GLUCOSE  01/11/2026    LIPID  01/11/2028    ADVANCE CARE PLANNING  01/11/2028    COLORECTAL CANCER SCREENING  11/05/2028    DTAP/TDAP/TD IMMUNIZATION (3 - Td or Tdap) 02/11/2031    HEPATITIS C SCREENING  Completed    DEPRESSION ACTION PLAN  Completed    INFLUENZA VACCINE  Completed    Pneumococcal Vaccine: 65+ Years  Completed    COVID-19 Vaccine  Completed    IPV IMMUNIZATION  Aged Out    HPV IMMUNIZATION  Aged Out    MENINGITIS IMMUNIZATION  Aged Out    RSV MONOCLONAL ANTIBODY  Aged Out         Review of Systems  Constitutional, HEENT, cardiovascular, pulmonary, GI, , musculoskeletal, neuro, skin, endocrine and psych systems are negative, except as otherwise noted.     Objective    Exam  BP (!) 158/76   Pulse 85   Temp 98.2  F (36.8  C) (Temporal)   Resp 16   Ht 1.71 m (5' 7.32\")   Wt 81.4 kg (179 lb 8 oz)   SpO2 95%   BMI 27.84 kg/m     Estimated body mass index " "is 27.84 kg/m  as calculated from the following:    Height as of this encounter: 1.71 m (5' 7.32\").    Weight as of this encounter: 81.4 kg (179 lb 8 oz).    Physical Exam  HENT:      Head: Normocephalic and atraumatic.      Right Ear: Tympanic membrane, ear canal and external ear normal. There is no impacted cerumen.      Left Ear: Tympanic membrane, ear canal and external ear normal. There is no impacted cerumen.      Nose: Nose normal. No congestion.      Mouth/Throat:      Pharynx: Oropharynx is clear. No oropharyngeal exudate or posterior oropharyngeal erythema.   Eyes:      General:         Right eye: No discharge.         Left eye: No discharge.      Extraocular Movements: Extraocular movements intact.      Conjunctiva/sclera: Conjunctivae normal.      Pupils: Pupils are equal, round, and reactive to light.   Cardiovascular:      Rate and Rhythm: Normal rate and regular rhythm.      Heart sounds: Normal heart sounds. No murmur heard.     No friction rub.   Pulmonary:      Effort: Pulmonary effort is normal. No respiratory distress.      Breath sounds: Normal breath sounds. No wheezing or rhonchi.   Abdominal:      General: Abdomen is flat. There is no distension.      Palpations: Abdomen is soft. There is no mass.      Tenderness: There is no abdominal tenderness. There is no guarding.   Musculoskeletal:         General: No swelling.      Cervical back: Normal range of motion and neck supple. No tenderness.      Right lower leg: No edema.      Left lower leg: No edema.   Lymphadenopathy:      Cervical: No cervical adenopathy.   Skin:     General: Skin is warm.      Findings: No rash.   Neurological:      General: No focal deficit present.      Mental Status: He is alert.      Cranial Nerves: No cranial nerve deficit.      Motor: No weakness.      Coordination: Coordination normal.      Gait: Gait normal.   Psychiatric:         Mood and Affect: Mood normal.         Behavior: Behavior normal.         Thought " Content: Thought content normal.         Judgment: Judgment normal.              3/6/2024   Mini Cog   Clock Draw Score 2 Normal   3 Item Recall 3 objects recalled   Mini Cog Total Score 5       Signed Electronically by: Rajinder Hernández MD

## 2024-03-06 NOTE — PATIENT INSTRUCTIONS
"Important Takeaway Points From This Visit:   Call your insurance about where to get the RSV vaccine if interested.     Call your insurance about where to get the shingles vaccine if interested.     Regarding your Blood Pressure:  We care about controlling high blood pressure to decrease your risk of heart disease, stroke, and kidney disease.    We decided not to change your blood pressure medications today, but I do need you to have this rechecked. Please schedule a follow up \"MA/Nursing\" visit to check your blood pressure in 2 weeks. You do not need to see me that day. If it is still high at that time, we will likely need to make a medication adjustment.    Your goal blood pressure is < 140/90.    To also get some readings outside the clinic, I recommend you monitor your blood pressure twice daily (taking 2 readings each time, 1-2 minutes apart) with a home blood pressure cuff. You may need to purchase this if you do not already have one. Report these numbers to me by calling the clinic, or messaging on [employer] after 2 weeks.     Once your blood pressure is under control again, we will have you check your blood pressures once weekly (taking 2 readings each time, 1-2 minutes apart). Schedule a follow-up visit with me if your numbers are becoming consistently above our set blood pressure goal above.    If applicable, decrease your caffeine consumption.    Limit alcohol use.    Stay on a strict sleep schedule, going to bed at the same time, get up at the same time, and give yourself at least 8 hours.  If noticing snoring, daytime sleepiness, or episodes where you stop breathing in your sleep request a sleep study.    Work on weight loss/maintaining a healthy weight:  Follow the DASH diet. (https://www.nhlbi.nih.gov/education/dash-eating-plan)  Increase exercise to 30 minutes a day 5 days a week (150 minutes a week on average).    Some over the counter medications can increase blood pressures like:  Sudafed " "(pseudoephedrine) containing products (also called \"decongestants\")   NSAID medications like ibuprofen (Advil and Motrin) and naproxen (Aleve or Naprosyn)        Preventive Care Advice   This is general advice given by our system to help you stay healthy. However, your care team may have specific advice just for you. Please talk to your care team about your preventive care needs.  Nutrition  Eat 5 or more servings of fruits and vegetables each day.  Try wheat bread, brown rice and whole grain pasta (instead of white bread, rice, and pasta).  Get enough calcium and vitamin D. Check the label on foods and aim for 100% of the RDA (recommended daily allowance).  Lifestyle  Exercise at least 150 minutes each week   (30 minutes a day, 5 days a week).  Do muscle strengthening activities 2 days a week. These help control your weight and prevent disease.  No smoking.  Wear sunscreen to prevent skin cancer.  Have a dental exam and cleaning every 6 months.  Yearly exams  See your health care team every year to talk about:  Any changes in your health.  Any medicines your care team has prescribed.  Preventive care, family planning, and ways to prevent chronic diseases.  Shots (vaccines)   HPV shots (up to age 26), if you've never had them before.  Hepatitis B shots (up to age 59), if you've never had them before.  COVID-19 shot: Get this shot when it's due.  Flu shot: Get a flu shot every year.  Tetanus shot: Get a tetanus shot every 10 years.  Pneumococcal, hepatitis A, and RSV shots: Ask your care team if you need these based on your risk.  Shingles shot (for age 50 and up).  General health tests  Diabetes screening:  Starting at age 35, Get screened for diabetes at least every 3 years.  If you are younger than age 35, ask your care team if you should be screened for diabetes.  Cholesterol test: At age 39, start having a cholesterol test every 5 years, or more often if advised.  Bone density scan (DEXA): At age 50, ask your " care team if you should have this scan for osteoporosis (brittle bones).  Hepatitis C: Get tested at least once in your life.  STIs (sexually transmitted infections)  Before age 24: Ask your care team if you should be screened for STIs.  After age 24: Get screened for STIs if you're at risk. You are at risk for STIs (including HIV) if:  You are sexually active with more than one person.  You don't use condoms every time.  You or a partner was diagnosed with a sexually transmitted infection.  If you are at risk for HIV, ask about PrEP medicine to prevent HIV.  Get tested for HIV at least once in your life, whether you are at risk for HIV or not.  Cancer screening tests  Cervical cancer screening: If you have a cervix, begin getting regular cervical cancer screening tests at age 21. Most people who have regular screenings with normal results can stop after age 65. Talk about this with your provider.  Breast cancer scan (mammogram): If you've ever had breasts, begin having regular mammograms starting at age 40. This is a scan to check for breast cancer.  Colon cancer screening: It is important to start screening for colon cancer at age 45.  Have a colonoscopy test every 10 years (or more often if you're at risk) Or, ask your provider about stool tests like a FIT test every year or Cologuard test every 3 years.  To learn more about your testing options, visit: https://www.[employer]/918150.pdf.  For help making a decision, visit: https://bit.ly/xs50319.  Prostate cancer screening test: If you have a prostate and are age 55 to 69, ask your provider if you would benefit from a yearly prostate cancer screening test.  Lung cancer screening: If you are a current or former smoker age 50 to 80, ask your care team if ongoing lung cancer screenings are right for you.  For informational purposes only. Not to replace the advice of your health care provider. Copyright   2023 [employer]. All rights reserved.  Clinically reviewed by the [facility] Transitions Program. [employer] [id number] - REV 01/24.

## 2024-03-07 LAB
ALBUMIN SERPL BCG-MCNC: 4.5 G/DL (ref 3.5–5.2)
ALP SERPL-CCNC: 84 U/L (ref 40–150)
ALT SERPL W P-5'-P-CCNC: 26 U/L (ref 0–70)
ANION GAP SERPL CALCULATED.3IONS-SCNC: 8 MMOL/L (ref 7–15)
AST SERPL W P-5'-P-CCNC: 32 U/L (ref 0–45)
BILIRUB SERPL-MCNC: 0.2 MG/DL
BUN SERPL-MCNC: 22 MG/DL (ref 8–23)
CALCIUM SERPL-MCNC: 9.5 MG/DL (ref 8.8–10.2)
CHLORIDE SERPL-SCNC: 103 MMOL/L (ref 98–107)
CHOLEST SERPL-MCNC: 146 MG/DL
CREAT SERPL-MCNC: 1.05 MG/DL (ref 0.67–1.17)
DEPRECATED HCO3 PLAS-SCNC: 30 MMOL/L (ref 22–29)
EGFRCR SERPLBLD CKD-EPI 2021: 78 ML/MIN/1.73M2
FASTING STATUS PATIENT QL REPORTED: NORMAL
GLUCOSE SERPL-MCNC: 99 MG/DL (ref 70–99)
HDLC SERPL-MCNC: 42 MG/DL
LDLC SERPL CALC-MCNC: 84 MG/DL
NONHDLC SERPL-MCNC: 104 MG/DL
POTASSIUM SERPL-SCNC: 4.1 MMOL/L (ref 3.4–5.3)
PROT SERPL-MCNC: 7.6 G/DL (ref 6.4–8.3)
PSA SERPL DL<=0.01 NG/ML-MCNC: 0.12 NG/ML (ref 0–4.5)
SODIUM SERPL-SCNC: 141 MMOL/L (ref 135–145)
TRIGL SERPL-MCNC: 98 MG/DL
TSH SERPL DL<=0.005 MIU/L-ACNC: 2.43 UIU/ML (ref 0.3–4.2)

## 2024-03-08 NOTE — RESULT ENCOUNTER NOTE
Ezekiel Salazar,    If you have not viewed these results on [employer] within 3 days, we will use an alternative method to contact you. We will contact you via the following protocol:    - Via letter if your results are normal.  - Via phone ([phone]) if your results are abnormal.     Here are my comments about your recent results:    CMP Results - Your blood sugar was normal. Your kidney function, electrolytes, and liver function were normal.    Lipids - Your cholesterol lab results were normal.    TSH - Your thyroid lab results were normal.    PSA - Your Prostate cancer screening lab results were normal.    Please call the clinic ([phone]), or message us on [employer] with any questions you may have.     Have a great day,    Dr. Merchant

## 2024-04-04 ENCOUNTER — TELEPHONE (OUTPATIENT)
Dept: FAMILY MEDICINE | Facility: CLINIC | Age: 69
End: 2024-04-04
Payer: COMMERCIAL

## 2024-04-04 NOTE — TELEPHONE ENCOUNTER
Reason for Call:  Appointment Request    Patient requesting this type of appt: Chronic Diease Management/Medication/Follow-Up    Requested provider: Rajinder Hernández    Reason patient unable to be scheduled: Not within requested timeframe    When does patient want to be seen/preferred time:  Pt had CT done recently and is having problems with his throat     Comments: Please call patient with a sooner appt    Could we send this information to you in [facility] or would you prefer to receive a phone call?:   Patient would prefer a phone call   Okay to leave a detailed message?: Yes at Cell number on file:    Telephone Information:   Mobile [phone]       Call taken on 4/4/2024 at 3:46 PM by Vonda Rose

## 2024-04-04 NOTE — TELEPHONE ENCOUNTER
I scheduled this patient for a virtual appt on Monday 04/08/24. Not sure if this would be appropriate for virtual, he said he had a recent CT scan done on 03/05/24 and it showed some thickening of his esophagus, and is having trouble swallowing. States you have been able to see his CT scans in the past through [facility].   Please advise if he needs to be seen in person or if virtual is ok

## 2024-04-08 ENCOUNTER — VIRTUAL VISIT (OUTPATIENT)
Dept: FAMILY MEDICINE | Facility: CLINIC | Age: 69
End: 2024-04-08
Payer: COMMERCIAL

## 2024-04-08 DIAGNOSIS — R93.89 ABNORMAL CT OF THE CHEST: Primary | ICD-10-CM

## 2024-04-08 PROCEDURE — G2211 COMPLEX E/M VISIT ADD ON: HCPCS | Mod: 95 | Performed by: FAMILY MEDICINE

## 2024-04-08 PROCEDURE — 99213 OFFICE O/P EST LOW 20 MIN: CPT | Mod: 95 | Performed by: FAMILY MEDICINE

## 2024-04-08 NOTE — PATIENT INSTRUCTIONS
"Important Takeaway Points From This Visit:     Please call [phone] to schedule your imaging study.     Regarding your Heartburn:  Gastroesophageal reflux disease, or GERD, is a common condition where stomach acid frequently flows back into the tube that connects the mouth to the stomach, called the esophagus. This can cause discomfort and irritation, commonly known as \"heartburn\".     The most common causes of GERD include:  A weakened valve at the bottom of the esophagus, which usually prevents stomach acid from coming back up.  A hiatal hernia, where part of the stomach pushes up into the chest.  Use of certain medications such as:  Pain/Fever Reducers:  Ibuprofen (Advil, Motrin)  Naproxen (Aleve, Naprosyn)   Aspirin  Antibiotics  Doxycycline  Minocycline  Clindamycin  Supplements:  Iron  Potassium  Anti-depressants  Amitriptyline   Bone Density Drugs:  Alendronate (Fosamax)  Others (Not a complete list)  Infections cause irritation of the stomach, or esophagus, such as a bacteria called H. Pylori, and a fungal infection called thrush.  A less common cause is Eosinophilic Esophagitis, a kind of allergy.     It's important to avoid certain foods that can trigger symptoms:  Chocolate  Citrus fruits  Spicy foods (Peruvian, Mexican, etc)  Caffeine  Peppermint  Fatty meals  Tomato's and tomato based sauces  Carbonated beverages may worsen symptoms and should be limited.     Eating smaller meals and avoiding lying down right after eating can also be helpful.    Keep a symptom journal to help determine if there is a certain trigger(s) to your symptoms (examples: exercise, use of medications/supplements, certain types of foods/beverages, positions, clothing, time of day, stress levels)    At-home treatments include:  Raising the head of your bed at night to reduce the chance of stomach acid flowing back into the esophagus. You could also use a wedge pillow for this.  Maintaining a healthy weight   Not smoking  Over the " counter medications such as:  Tums as needed to provide quick relief by neutralizing acid.  Famotidine (Pepcid/Zantac 360) 20 mg twice daily as needed, or scheduled, to keep symptoms controlled by reducing acid production.  Omeprazole (Prilosec) or Esomeprazole (Nexium) 20 - 40 mg daily can be used for persistent symptoms.   These medications may be taken for 4-8 weeks. Follow-up with me if needing the medications longer than this.  Rebound symptoms may occur if these medications are stopped abruptly. It may be best to reduce the dose gradually, or to take the medication every other day for 1-2 weeks prior to completely stopping these medications.     Medical tests to evaluate GERD may include an upper endoscopy, where a thin tube with a camera is inserted into the esophagus to examine it, and pH monitoring, which measures the acidity in the esophagus over a period of time. These tests help doctors understand the extent of the condition and plan appropriate treatment.    Concerning symptoms of GERD include:  Persistent heartburn or vomiting  Severe pain  Difficulty swallowing  Black tarry stools  Blood in your stools or vomit    If you experiences these symptoms regularly, it's important to follow-up with me, or another doctor right away. Consider going to the emergency department if they occur.

## 2024-04-08 NOTE — PROGRESS NOTES
"Instructions Relayed to Patient by Virtual Roomer:     Patient is active on [employer]:   Relayed following to patient: \"It looks like you are active on [employer], are you able to join the visit this way? If not, do you need us to send you a link now or would you like your provider to send a link via text or email when they are ready to initiate the visit?\"    Reminded patient to ensure they were logged on to virtual visit by arrival time listed. Documented in appointment notes if patient had flexibility to initiate visit sooner than arrival time. If pediatric virtual visit, ensured pediatric patient along with parent/guardian will be present for video visit.     Patient offered the website [email or web address] and/or phone number to [employer] Help line: [phone]    Martin is a 68 year old who is being evaluated via a billable video visit.    How would you like to obtain your AVS? [employer]  If the video visit is dropped, the invitation should be resent by: Text to cell phone: [phone]  Will anyone else be joining your video visit? No    Assessment & Plan   1. Abnormal CT of the chest  Follow-up CT scan and endoscopy ordered as recommended per radiologist.  Given patient's dysphagia endoscopy is appropriate.  Likely has GERD and needs to be treated more effectively.  Await endoscopy results.  Avoid NSAIDs.  Trial OTC PPI.  See AVS for instructions.    Follow-up CT scan looking for further resolution ordered for October 2024.    - Adult GI  Referral - Procedure Only; Future  - CT Chest w/o Contrast; Future    Rajinder Henrández MD  [facility]    Disclaimer: This note consists of symbols derived from keyboarding, dictation and/or voice recognition software. As a result, there may be errors in the script that have gone undetected. Please consider this when interpreting information found in this chart.    The longitudinal plan of care for the " diagnosis(es)/condition(s) as documented were addressed during this visit. Due to the added complexity in care, I will continue to support Martin in the subsequent management and with ongoing continuity of care.    Subjective   Martin is a 68 year old, presenting for the following health issues:  Results (CT) and Referral (Throat)  - Thickening of the Esophageal wall  - Lungs, still has a spot      4/8/2024     1:13 PM   Additional Questions   Roomed by Abhilash Roach Start Time:  3:11 pm    History of Present Illness       Reason for visit:  GERD concern, CT results and Referrals      GERD/Heartburn  Onset/Duration: Has happened on and off over the last few months  Description: Has happened a lot and more often now. 10 times in the last 6 months. CT scan results recommended and endoscopy  Intensity: moderate  Progression of Symptoms: worsening  Accompanying Signs & Symptoms:  Does it feel like food gets stuck or trouble swallowing: YES- Feels like food is going down the wrong tube and gets stuck  Nausea: No  Vomiting (bloody?): No  Abdominal Pain: No  Black-Tarry stools: No  Bloody stools: No  History:  Previous similar episodes: YES- Has happened in the past when he eats too fast and then drinks a lot of fluid.   Previous ulcers: No  Precipitating factors:   Caffeine use: No  Alcohol use: No  NSAID/Aspirin use: YES- 4 naproxen daily  Tobacco use: No  Worse with  Has happened on occasions when he has eaten Ham sandwich, hamburger, peanut butter sandwich, pork chop.  Alleviating factors: Chew his food as long as possible. Not taking as big of bites. Not chugging liquids after eating  Therapies tried and outcome:             Lifestyle changes: None            Medications: none    [facility]  CT scan shows interval improvement compared to previous done in November 2023.  Report copied and pasted below.    They recommended repeat scan on or after 11/6/2024.   "Additionally there was incidental thickening of the esophagus noted which may correlate to patient's symptoms above.  They did recommend endoscopy.  Patient is wondering what next steps are.    Notes daughter will be having an endoscopy later this week.      Review of Systems  Constitutional, HEENT, cardiovascular, pulmonary, GI, , musculoskeletal, neuro, skin, endocrine and psych systems are negative, except as otherwise noted.      Objective           Vitals:  No vitals were obtained today due to virtual visit.    Physical Exam   GENERAL: alert and no distress  EYES: Eyes grossly normal to inspection.  No discharge or erythema, or obvious scleral/conjunctival abnormalities.  RESP: No audible wheeze, cough, or visible cyanosis.    SKIN: Visible skin clear. No significant rash, abnormal pigmentation or lesions.  NEURO: Cranial nerves grossly intact.  Mentation and speech appropriate for age.  PSYCH: Appropriate affect, tone, and pace of words    Reviewed outside CT imaging.  \"Imaging Results - CT CHEST WO (03/08/2024 12:05 PM CST)  Impressions   03/08/2024 2:02 PM CST   IMPRESSION:  1.  There has been some additional improvement of the consolidation  in the lingula left lung today compared with 11/10/2023, although it  has not resolved. Findings today are also associated with  bronchiectasis. This may represent developing chronic atelectasis or  scarring/fibrosis. The previously seen masslike appearance has become  less conspicuous on today's exam. Given progressive improvement  dating back to 10/06/2023, a follow-up CT after 10/06/2024 could be  done to reassess one year after initial visualization.  2.  No new or progressive infiltrate or consolidation.  3.  There is thickening of the wall of the distal esophagus which is  more pronounced today than previously. This is suspicious for  esophagitis, but is indeterminate on CT, and endoscopy is recommended  to more fully evaluate and exclude other pathology.  4.  " "See above for additional exam details.      Imaging Results - CT CHEST WO (03/08/2024 12:05 PM CST)  Narrative   03/08/2024 2:02 PM CST   EXAM: CT CHEST wo CONTRAST  LOCATION: [facility]  DATE: 3/8/2024    INDICATION: Follow-up previous abnormal CT chest. Consolidation in  the lingula left lung on prior studies.  COMPARISON: 11/10/2023 and 10/6/2023.  TECHNIQUE: CT chest without IV contrast. Multiplanar reformats were  obtained. Dose reduction techniques were used.  CONTRAST: None.    FINDINGS:  LUNGS AND PLEURA: Some consolidation and volume loss is again seen  along the posterior lingula left lung. When compared with previous  studies, this has shown progressive improvement, although has not  fully cleared. There is some associated mild bronchiectasis in this  region which is more conspicuous on today's exam. No new areas of  infiltrate or consolidation. Mild dependent atelectasis both lungs.  Calcified granulomas left lower lobe. No pleural effusion.    MEDIASTINUM/AXILLAE: Previously seen prominent left hilar lymph node  is not as clearly conspicuous on today's noncontrast study. No  pathologically enlarged lymph nodes identified. No significant  pericardial fluid. Normal caliber thoracic aorta. There is some  thickening of the wall of the distal esophagus which is more  pronounced today than previously.    CORONARY ARTERY CALCIFICATION: Moderate.    UPPER ABDOMEN: A small suspected cyst or hemangioma in the right  hepatic lobe has not shown significant interval change, not  characterized more definitively on today's noncontrast study.    MUSCULOSKELETAL: Hypertrophic and degenerative changes are again seen  in the spine.   \"        Video-Visit Details    Type of service:  Video Visit   Video End Time:3:22 PM  Originating Location (pt. Location): Home    Distant Location (provider location):  On-site  Platform used for Video Visit: Edgar    "

## 2024-04-29 ENCOUNTER — TELEPHONE (OUTPATIENT)
Dept: GASTROENTEROLOGY | Facility: CLINIC | Age: 69
End: 2024-04-29
Payer: COMMERCIAL

## 2024-04-29 RX ORDER — BUPRENORPHINE AND NALOXONE 8; 2 MG/1; MG/1
1 FILM, SOLUBLE BUCCAL; SUBLINGUAL DAILY
COMMUNITY
Start: 2024-04-21

## 2024-04-29 NOTE — TELEPHONE ENCOUNTER
"Endoscopy Scheduling Screen  SCREENING QUESTIONS COMPLETED: 04/29/2024  Have you had a positive Covid test in the last 14 days?  No    What is your communication preference for Instructions and/or Bowel Prep?   MyChart    What insurance is in the chart?  Other:  [employer] medicare    Ordering/Referring Provider:     IRENA HARO      (If ordering provider performs procedure, schedule with ordering provider unless otherwise instructed. )    BMI: Estimated body mass index is 27.84 kg/m  as calculated from the following:    Height as of 3/6/24: 1.71 m (5' 7.32\").    Weight as of 3/6/24: 81.4 kg (179 lb 8 oz).     Sedation Ordered  moderate sedation.   If patient BMI > 50 do not schedule in [facility].    If patient BMI > 45 do not schedule at [facility].    Are you taking methadone or Suboxone?  Yes   Patient must be scheduled with MAC sedation.    Have you had difficulties, pain, or discomfort during past endoscopy procedures?  No    Are you taking any prescription medications for pain 3 or more times per week?   NO, No RN review required.    Do you have a history of malignant hyperthermia?  No    (Females) Are you currently pregnant?   No     Have you been diagnosed or told you have pulmonary hypertension?   No    Do you have an LVAD?  No    Have you been told you have moderate to severe sleep apnea?  No    Have you been told you have COPD, asthma, or any other lung disease?  No    Do you have any heart conditions?  No     Have you ever had or are you waiting for an organ transplant?  No. Continue scheduling, no site restrictions.    Have you had a stroke or transient ischemic attack (TIA aka \"mini stroke\" in the last 6 months?   No    Have you been diagnosed with or been told you have cirrhosis of the liver?   No    Are you currently on dialysis?   No    Do you need assistance transferring?   No    BMI: Estimated body mass index is 27.84 kg/m  as calculated from the following:    Height as of 3/6/24: 1.71 m (5' 7.32\").    " Weight as of 3/6/24: 81.4 kg (179 lb 8 oz).     Is patients BMI > 40 and scheduling location [facility]?  No    Do you take an injectable medication for weight loss or diabetes (excluding insulin)?  Yes, hold time can be up to 7 days. Please consult with you prescribing provider to discuss endoscopy recommendations.     Do you take the medication Naltrexone?  No    Do you take blood thinners?  No       Prep   Are you currently on dialysis or do you have chronic kidney disease?  No    Do you have a diagnosis of diabetes?  No    Do you have a diagnosis of cystic fibrosis (CF)?  No    On a regular basis do you go 3 -5 days between bowel movements?  No    BMI > 40?  No    Preferred Pharmacy:    [facility] [place], [place] - [address] [address]  [address] [address] [address]  Phone: [phone] Fax: [phone]      Final Scheduling Details     Procedure scheduled  Upper endoscopy (EGD)    Surgeon:  Nilton     Date of procedure:  5/3/24     Pre-OP / PAC:   No - Not required for this site.    Location  [facility] - Patient preference.    Sedation   MAC/Deep Sedation - Per exclusion criteria.      Patient Reminders:   You will receive a call from a Nurse to review instructions and health history.  This assessment must be completed prior to your procedure.  Failure to complete the Nurse assessment may result in the procedure being cancelled.      On the day of your procedure, please designate an adult(s) who can drive you home stay with you for the next 24 hours. The medicines used in the exam will make you sleepy. You will not be able to drive.      You cannot take public transportation, ride share services, or non-medical taxi service without a responsible caregiver.  Medical transport services are allowed with the requirement that a responsible caregiver will receive you at your destination.  We require that drivers and caregivers are confirmed prior to your procedure.

## 2024-05-02 ENCOUNTER — ANESTHESIA EVENT (OUTPATIENT)
Dept: GASTROENTEROLOGY | Facility: CLINIC | Age: 69
End: 2024-05-02
Payer: COMMERCIAL

## 2024-05-02 NOTE — PROGRESS NOTES
[facility] History and Physical    Martin Armstrong MRN# [id number]   Age: 68 year old YOB: 1955     Date of Admission:  (Not on file)    Home clinic: [facility]  Primary care provider: Rajinder Hernández          Impression and Plan:   Impression:   Abnormal CT of the chest [R93.89]-thickening of distal esophagus on chest CT for lung lesion  No prior EGD      Plan:   Proceed to EGD as planned.  The procedure, risks(bleeding, perforation), benefits and alternatives were discussed and the patient agrees to proceed. Cleared for Anesthesia             Chief Complaint:   Abnormal CT of the chest [R93.89]    History is obtained from the patient          History of Present Illness:   This 68 year old male is being seen at this time for evaluation for EGD. Food getting stuck for several years.  Not on a PPI.  Thickened distal esophagus on CT.           Past Medical History:     Past Medical History:   Diagnosis Date    Chemical dependency (H)     remission- 2008, suboxone    Diverticulosis     DJD (degenerative joint disease) of knee     Thyroid disease             Past Surgical History:     Past Surgical History:   Procedure Laterality Date    ARTHROPLASTY KNEE UNICOMPARTMENT  11/8/2011    Procedure:ARTHROPLASTY KNEE UNICOMPARTMENT; LEFT KNEE UNICOMPARTMENT ARTHROPLASTY (FREEMAN)^; Surgeon:SHABBIR RADER; Location:[facility] OR    ARTHROSCOPY KNEE RT/LT  1/2006    left    COLONOSCOPY  3/27/08    due in 2013    COLONOSCOPY WITH CO2 INSUFFLATION N/A 11/5/2018    Procedure: screening colonoscopy;  Surgeon: Adrian Hawthorne MD;  Location:  OR            Social History:     Social History     Tobacco Use    Smoking status: Never    Smokeless tobacco: Never   Substance Use Topics    Alcohol use: No            Family History:     Family History   Problem Relation Age of Onset    Family History Negative Father     Cancer Mother     Eye Disorder Paternal Grandmother     Asthma No  family hx of     C.A.D. No family hx of     Diabetes No family hx of     Hypertension No family hx of     Cerebrovascular Disease No family hx of     Breast Cancer No family hx of     Cancer - colorectal No family hx of     Prostate Cancer No family hx of             Immunizations:     VACCINE/DOSE   Diptheria   DPT   DTAP   HBIG   Hepatitis A   Hepatitis B   HIB   Influenza   Measles   Meningococcal   MMR   Mumps   Pneumococcal   Polio   Rubella   Small Pox   TDAP   Varicella   Zoster            Allergies:   No Known Allergies         Medications:     No current facility-administered medications for this encounter.     Current Outpatient Medications   Medication Sig Dispense Refill    buprenorphine HCl-naloxone HCl (SUBOXONE) 8-2 MG per film       aspirin-acetaminophen-caffeine (EXCEDRIN MIGRAINE) 250-250-65 MG tablet Take 1 tablet by mouth every 6 hours as needed for headaches (Patient not taking: Reported on 4/8/2024)      buprenorphine-naloxone (SUBOXONE) 8-2 MG SUBL sublingual tablet       citalopram (CELEXA) 20 MG tablet Take 1 tablet (20 mg) by mouth daily 90 tablet 3    hydroCHLOROthiazide 12.5 MG tablet Take 1 tablet (12.5 mg) by mouth daily 90 tablet 3    levothyroxine (SYNTHROID/LEVOTHROID) 112 MCG tablet Take 1 tablet (112 mcg) by mouth daily 90 tablet 3    Multiple Vitamins-Minerals (MULTIVITAL PO)       NAPROXEN PO                Review of Systems:   The review of systems was positive for the following findings.  None.  The remainder of the review of systems was unremarkable.          Physical Exam:   All vitals have been reviewed  There were no vitals taken for this visit.  No intake or output data in the 24 hours ending 05/02/24 1142  SHEENT examination revealed NC/aT, EOMI.  Examination of the chest revealed CTA.  Examination of the heart revealed RRR.  Examination of the abdomen revealed Soft, non teder.  The neuromuscular examination was NL.          Data:   All laboratory data reviewed  No  results found for any visits on 05/03/24.  -     Tano Callaway MD, FACS

## 2024-05-02 NOTE — ANESTHESIA PREPROCEDURE EVALUATION
Anesthesia Pre-Procedure Evaluation    Patient: Martin Armstrong   MRN: [id number] : 1955        Procedure : Procedure(s):  Esophagoscopy, gastroscopy, duodenoscopy (EGD), combined          Past Medical History:   Diagnosis Date     Chemical dependency (H)     remission- 2008, suboxone     Diverticulosis      DJD (degenerative joint disease) of knee      Thyroid disease       Past Surgical History:   Procedure Laterality Date     ARTHROPLASTY KNEE UNICOMPARTMENT  2011    Procedure:ARTHROPLASTY KNEE UNICOMPARTMENT; LEFT KNEE UNICOMPARTMENT ARTHROPLASTY (FREEMAN)^; Surgeon:SHABBIR RADER; Location:[facility]     ARTHROSCOPY KNEE RT/LT  2006    left     COLONOSCOPY  3/27/08    due in      COLONOSCOPY WITH CO2 INSUFFLATION N/A 2018    Procedure: screening colonoscopy;  Surgeon: Adrian Hawthorne MD;  Location:  OR      No Known Allergies   Social History     Tobacco Use     Smoking status: Never     Smokeless tobacco: Never   Substance Use Topics     Alcohol use: No      Wt Readings from Last 1 Encounters:   24 81.4 kg (179 lb 8 oz)        Anesthesia Evaluation   Pt has had prior anesthetic. Type: General and MAC.        ROS/MED HX  ENT/Pulmonary: Comment: H/O SOB with exertion       Neurologic: Comment: Abnormal gait      Cardiovascular:     (+)  - -   -  - -                                 Previous cardiac testing   Echo: Date:  Results:  No ECG evidence of ischemia.  No stress induced regional wall motion abnormalities.  Normal resting LV function with EF of approximately 60-65%; normal response   to exercise with increase to approximately 70-75%.  Exercise tolerance is average for age.  PatientHeight: 69 in  PatientWeight: 198 lbs  SystolicPressure: 155 mmHg  DiastolicPressure: 93 mmHg  HeartRate: 70 bpm  BSA 2.1 m^2     Stress Findings  Maximum workload 150 maya.  Patient was given 4.5ml mixture of 1.5ml Definity and 8.5ml saline.  IV start location LAC .  No electrocardiographic  evidence of ischemia.  Target Heart Rate was achieved.  Normal heart rate response to exercise.  Normal blood pressure response to exercise.  Exercise tolerance is average for age.     Baseline EKG/Symptoms  The baseline ECG displays normal sinus rhythm.     Left Ventricle  Left ventricular systolic function is normal.     Aortic Valve  The aortic valve is normal in structure and function.     Mitral Valve  The mitral valve is normal in structure and function.     Tricuspid Valve  The tricuspid valve is normal in structure and function.  There is trace tricuspid regurgitation.     Right Ventricle  The right ventricular systolic function is normal.       Stress Test:  Date: Results:    ECG Reviewed:  Date: 7/12/2021 Results:    Cath:  Date: Results:      METS/Exercise Tolerance:     Hematologic:  - neg hematologic  ROS     Musculoskeletal: Comment: Chronic low back pain   DJD  (+)  arthritis,             GI/Hepatic: Comment: Diverticulosis      Renal/Genitourinary:  - neg Renal ROS     Endo:     (+)          thyroid problem, hypothyroidism,           Psychiatric/Substance Use: Comment: H/O opioid abuse currently taking  Suboxone    (+) psychiatric history anxiety and depression  H/O chronic opiod use .     Infectious Disease:  - neg infectious disease ROS     Malignancy:  - neg malignancy ROS     Other:            Physical Exam    Airway  airway exam normal      Mallampati: II   TM distance: > 3 FB   Neck ROM: full   Mouth opening: > 3 cm    Respiratory Devices and Support         Dental  no notable dental history         Cardiovascular   cardiovascular exam normal       Rhythm and rate: regular and normal     Pulmonary   pulmonary exam normal        breath sounds clear to auscultation         OUTSIDE LABS:  CBC:   Lab Results   Component Value Date    WBC 7.3 03/06/2024    WBC 7.5 01/11/2023    HGB 11.9 (L) 03/06/2024    HGB 12.3 (L) 01/11/2023    HCT 37.0 (L) 03/06/2024    HCT 37.7 (L) 01/11/2023      "03/06/2024     01/11/2023     BMP:   Lab Results   Component Value Date     03/06/2024     01/11/2023    POTASSIUM 4.1 03/06/2024    POTASSIUM 4.2 01/11/2023    CHLORIDE 103 03/06/2024    CHLORIDE 105 01/11/2023    CO2 30 (H) 03/06/2024    CO2 29 01/11/2023    BUN 22.0 03/06/2024    BUN 22 01/11/2023    CR 1.05 03/06/2024    CR 1.00 01/11/2023    GLC 99 03/06/2024    GLC 96 01/11/2023     COAGS: No results found for: \"PTT\", \"INR\", \"FIBR\"  POC: No results found for: \"BGM\", \"HCG\", \"HCGS\"  HEPATIC:   Lab Results   Component Value Date    ALBUMIN 4.5 03/06/2024    PROTTOTAL 7.6 03/06/2024    ALT 26 03/06/2024    AST 32 03/06/2024    ALKPHOS 84 03/06/2024    BILITOTAL 0.2 03/06/2024     OTHER:   Lab Results   Component Value Date    SUSIE 9.5 03/06/2024    TSH 2.43 03/06/2024    T4 1.10 02/11/2021       Anesthesia Plan    ASA Status:  2    NPO Status:  NPO Appropriate    Anesthesia Type: MAC.     - Reason for MAC: straight local not clinically adequate   Induction: Intravenous, Propofol.   Maintenance: TIVA.        Consents    Anesthesia Plan(s) and associated risks, benefits, and realistic alternatives discussed. Questions answered and patient/representative(s) expressed understanding.     - Discussed:     - Discussed with:  Patient      - Extended Intubation/Ventilatory Support Discussed: No.      - Patient is DNR/DNI Status: No     Use of blood products discussed: No .     Postoperative Care    Pain management: Oral pain medications.   PONV prophylaxis: Background Propofol Infusion     Comments:    Other Comments: The risks and benefits of anesthesia, and the alternatives where applicable, have been discussed with the patient, and they wish to proceed.              BETH Florian CRNA    I have reviewed the pertinent notes and labs in the chart from the past 30 days and (re)examined the patient.  Any updates or changes from those notes are reflected in this note.                  "

## 2024-05-03 ENCOUNTER — ANESTHESIA (OUTPATIENT)
Dept: GASTROENTEROLOGY | Facility: CLINIC | Age: 69
End: 2024-05-03
Payer: COMMERCIAL

## 2024-05-03 ENCOUNTER — HOSPITAL ENCOUNTER (OUTPATIENT)
Facility: CLINIC | Age: 69
Discharge: HOME | End: 2024-05-03
Attending: SPECIALIST | Admitting: SPECIALIST
Payer: COMMERCIAL

## 2024-05-03 VITALS
OXYGEN SATURATION: 93 % | HEART RATE: 75 BPM | TEMPERATURE: 97.9 F | DIASTOLIC BLOOD PRESSURE: 84 MMHG | RESPIRATION RATE: 18 BRPM | SYSTOLIC BLOOD PRESSURE: 123 MMHG

## 2024-05-03 DIAGNOSIS — K21.00 GASTROESOPHAGEAL REFLUX DISEASE WITH ESOPHAGITIS WITHOUT HEMORRHAGE: Primary | ICD-10-CM

## 2024-05-03 LAB — UPPER GI ENDOSCOPY: NORMAL

## 2024-05-03 PROCEDURE — 88342 IMHCHEM/IMCYTCHM 1ST ANTB: CPT | Mod: TC | Performed by: SPECIALIST

## 2024-05-03 PROCEDURE — 370N000017 HC ANESTHESIA TECHNICAL FEE, PER MIN: Performed by: SPECIALIST

## 2024-05-03 PROCEDURE — 250N000009 HC RX 250: Performed by: NURSE ANESTHETIST, CERTIFIED REGISTERED

## 2024-05-03 PROCEDURE — 43239 EGD BIOPSY SINGLE/MULTIPLE: CPT | Performed by: SPECIALIST

## 2024-05-03 PROCEDURE — 250N000011 HC RX IP 250 OP 636: Performed by: NURSE ANESTHETIST, CERTIFIED REGISTERED

## 2024-05-03 PROCEDURE — 258N000003 HC RX IP 258 OP 636: Performed by: NURSE ANESTHETIST, CERTIFIED REGISTERED

## 2024-05-03 RX ORDER — NALOXONE HYDROCHLORIDE 0.4 MG/ML
0.4 INJECTION, SOLUTION INTRAMUSCULAR; INTRAVENOUS; SUBCUTANEOUS
Status: DISCONTINUED | OUTPATIENT
Start: 2024-05-03 | End: 2024-05-03 | Stop reason: HOSPADM

## 2024-05-03 RX ORDER — PROPOFOL 10 MG/ML
INJECTION, EMULSION INTRAVENOUS PRN
Status: DISCONTINUED | OUTPATIENT
Start: 2024-05-03 | End: 2024-05-03

## 2024-05-03 RX ORDER — LIDOCAINE 40 MG/G
CREAM TOPICAL
Status: DISCONTINUED | OUTPATIENT
Start: 2024-05-03 | End: 2024-05-03 | Stop reason: HOSPADM

## 2024-05-03 RX ORDER — FLUMAZENIL 0.1 MG/ML
0.2 INJECTION, SOLUTION INTRAVENOUS
Status: DISCONTINUED | OUTPATIENT
Start: 2024-05-03 | End: 2024-05-03 | Stop reason: HOSPADM

## 2024-05-03 RX ORDER — GLYCOPYRROLATE 0.2 MG/ML
INJECTION, SOLUTION INTRAMUSCULAR; INTRAVENOUS PRN
Status: DISCONTINUED | OUTPATIENT
Start: 2024-05-03 | End: 2024-05-03

## 2024-05-03 RX ORDER — LIDOCAINE HYDROCHLORIDE 20 MG/ML
INJECTION, SOLUTION INFILTRATION; PERINEURAL PRN
Status: DISCONTINUED | OUTPATIENT
Start: 2024-05-03 | End: 2024-05-03

## 2024-05-03 RX ORDER — NALOXONE HYDROCHLORIDE 0.4 MG/ML
0.2 INJECTION, SOLUTION INTRAMUSCULAR; INTRAVENOUS; SUBCUTANEOUS
Status: DISCONTINUED | OUTPATIENT
Start: 2024-05-03 | End: 2024-05-03 | Stop reason: HOSPADM

## 2024-05-03 RX ORDER — PANTOPRAZOLE SODIUM 40 MG/1
40 TABLET, DELAYED RELEASE ORAL DAILY
Qty: 30 TABLET | Refills: 3 | Status: SHIPPED | OUTPATIENT
Start: 2024-05-03 | End: 2024-08-27

## 2024-05-03 RX ORDER — SODIUM CHLORIDE, SODIUM LACTATE, POTASSIUM CHLORIDE, CALCIUM CHLORIDE 600; 310; 30; 20 MG/100ML; MG/100ML; MG/100ML; MG/100ML
INJECTION, SOLUTION INTRAVENOUS CONTINUOUS
Status: DISCONTINUED | OUTPATIENT
Start: 2024-05-03 | End: 2024-05-03 | Stop reason: HOSPADM

## 2024-05-03 RX ADMIN — SODIUM CHLORIDE, POTASSIUM CHLORIDE, SODIUM LACTATE AND CALCIUM CHLORIDE: 600; 310; 30; 20 INJECTION, SOLUTION INTRAVENOUS at 12:01

## 2024-05-03 RX ADMIN — PROPOFOL 50 MG: 10 INJECTION, EMULSION INTRAVENOUS at 12:34

## 2024-05-03 RX ADMIN — PROPOFOL 100 MG: 10 INJECTION, EMULSION INTRAVENOUS at 12:32

## 2024-05-03 RX ADMIN — LIDOCAINE HYDROCHLORIDE 100 MG: 20 INJECTION, SOLUTION INFILTRATION; PERINEURAL at 12:31

## 2024-05-03 RX ADMIN — GLYCOPYRROLATE 0.2 MG: 0.2 INJECTION, SOLUTION INTRAMUSCULAR; INTRAVENOUS at 12:23

## 2024-05-03 ASSESSMENT — ACTIVITIES OF DAILY LIVING (ADL)
ADLS_ACUITY_SCORE: 40
ADLS_ACUITY_SCORE: 40

## 2024-05-03 NOTE — ANESTHESIA POSTPROCEDURE EVALUATION
Patient: Martin Armstrong    Procedure: Procedure(s):  ESOPHAGOGASTRODUODENOSCOPY, WITH BIOPSY       Anesthesia Type:  MAC    Note:  Disposition: Outpatient   Postop Pain Control: Uneventful            Sign Out: Well controlled pain   PONV: No   Neuro/Psych: Uneventful            Sign Out: Acceptable/Baseline neuro status   Airway/Respiratory: Uneventful            Sign Out: Acceptable/Baseline resp. status   CV/Hemodynamics: Uneventful            Sign Out: Acceptable CV status   Other NRE: NONE   DID A NON-ROUTINE EVENT OCCUR? No    Event details/Postop Comments:  Pt was happy with anesthesia care.  No complications.  I will follow up with the pt if needed.           Last vitals:  Vitals Value Taken Time   BP 95/57 05/03/24 1305   Temp     Pulse 83 05/03/24 1305   Resp     SpO2 94 % 05/03/24 1309   Vitals shown include unfiled device data.    Electronically Signed By: BETH Tavarez CRNA  May 3, 2024  1:10 PM

## 2024-05-03 NOTE — DISCHARGE INSTRUCTIONS
[facility]    Home Care Following Endoscopy          Activity:  You have just undergone an endoscopic procedure usually performed with conscious sedation.  Do not work or operate machinery (including a car) for at least 12 hours.    I encourage you to walk and attempt to pass this air as soon as possible.    Diet:  Return to the diet you were on before your procedure but eat lightly for the first 12-24 hours.  Drink plenty of water.  Resume any regular medications unless otherwise advised by your physician.  Please begin any new medication prescribed as a result of your procedure as directed by your physician.   If you had any biopsy or polyp removed please refrain from aspirin or aspirin products for 2 days.  If on Coumadin please restart as instructed by your physician.   Pain:  You may take Tylenol as needed for pain.  Expected Recovery:  You can expect some mild abdominal fullness and/or discomfort due to the air used to inflate your intestinal tract. It is also normal to have a mild sore throat after upper endoscopy.    Call Your Physician if You Have:  After Upper Endoscopy:  Shoulder, back or chest pain.  Difficulty breathing or swallowing.  Vomiting blood.    *THE DOCTOR SENT A PRESCRIPTION FOR PROTONIX TO YOUR PHARMACY ([facility] [facility] [facility]) FOR YOU TO  AND START    Any questions or concerns about your recovery, please call [phone] or after hours [address] ([phone]) Nurse Advice Line.    Follow-up Care:  You did have polyps/biopsy tissue sample(s) removed.  The polyps/biopsy tissue sample(s) will be sent to pathology.    You should receive letter in your My Chart from Dr. Callaway with your results within 1-2 weeks. If you do not participate in My Chart a physical letter will come in the mail in 2-3 weeks.  Please call if you have not received a notification of your results.  If asked to return to clinic please make an appointment 1 week after your procedure.  Call  [phone].

## 2024-05-03 NOTE — ANESTHESIA CARE TRANSFER NOTE
Patient: Martin Armstrong    Procedure: Procedure(s):  ESOPHAGOGASTRODUODENOSCOPY, WITH BIOPSY       Diagnosis: Abnormal CT of the chest [R93.89]  Diagnosis Additional Information: No value filed.    Anesthesia Type:   MAC     Note:    Oropharynx: oropharynx clear of all foreign objects and spontaneously breathing  Level of Consciousness: drowsy  Oxygen Supplementation: room air    Independent Airway: airway patency satisfactory and stable  Dentition: dentition unchanged  Vital Signs Stable: post-procedure vital signs reviewed and stable  Report to RN Given: handoff report given  Patient transferred to: Phase II    Handoff Report: Identifed the Patient, Identified the Reponsible Provider, Reviewed the pertinent medical history, Discussed the surgical course, Reviewed Intra-OP anesthesia mangement and issues during anesthesia, Set expectations for post-procedure period and Allowed opportunity for questions and acknowledgement of understanding      Vitals:  Vitals Value Taken Time   BP 95/57 05/03/24 1305   Temp     Pulse 83 05/03/24 1305   Resp     SpO2 92 % 05/03/24 1308   Vitals shown include unfiled device data.    Electronically Signed By: BETH Tavarez CRNA  May 3, 2024  1:09 PM   GYN CLINIC VISIT  5/3/2019  CC: HRT follow up    HPI:  Angy Haji is a 52 year old  who presents to clinic to discuss refill of HRT.     Since I last saw patient she has had worsening psychiatric issues.  18 ED, admitted for manic sx  Seeing psychiatrist at [facility]. Adjusting medication - tried multiple antipyschotics. On depakote and lithium currently.  Mood stable, improving    In regard to HRT, she feels that her current dose controls her vasomotor symptoms well.  No hot flushes at night. Sleep is much better. Minor hot flushes during the day.  No bleeding since Oct 2018. Prior to that was getting a period every 8 weeks.       PHQ-9 SCORE 10/9/2018 2018 5/3/2019   PHQ-9 Total Score 9 11 7   Some encounter information is confidential and restricted. Go to Review Flowsheets activity to see all data.     JOANNA-7 SCORE 10/9/2018 2018 5/3/2019   Total Score 11 21 4     Vitals:    19 1102   BP: 130/80   Temp: 97.4  F (36.3  C)   TempSrc: Oral   Weight: 129.3 kg (285 lb)     Alert, oriented, very pleasant      ASSESSMENT:  53 year old  female on HRT for management of perimenopausal vasomotor symptoms.  Vasomotor symptoms well controlled on current dose.  Significant psychiatric issues - bipolar - new this year, improving with treatment.     1. Perimenopausal vasomotor symptoms  - estrogen conj-medroxyPROGESTERone (PREMPRO) 0.625-2.5 MG tablet; Take 1 tablet by mouth daily  Dispense: 90 tablet; Refill: 3    Discussed risks of HRT including thromboembolic disease and breast cancer. Discussed importance of progesterone component to protect uterine lining. Discussed recommendation of using lowest dose for shortest time needed to relieve symptoms. Due to recent psychiatric issues, do not recommend trying to change or taper HRT at this time. Plan to re-evaluate yearly.     Due for pap in .   Due for mammogram this fall.     Greater than 50% of this 15 minute appointment was spent in  counseling with the patient on issues described above in the history of present illness and in the plan, including evaluation and management of HRT.     Romy Min MD

## 2024-05-03 NOTE — ANESTHESIA POSTPROCEDURE EVALUATION
Patient: Martin Armstrong    Procedure: Procedure(s):  ESOPHAGOGASTRODUODENOSCOPY, WITH BIOPSY       Anesthesia Type:  MAC    Note:  Disposition: Outpatient   Postop Pain Control: Uneventful            Sign Out: Well controlled pain   PONV: No   Neuro/Psych: Uneventful            Sign Out: Acceptable/Baseline neuro status   Airway/Respiratory: Uneventful            Sign Out: Acceptable/Baseline resp. status   CV/Hemodynamics: Uneventful            Sign Out: Acceptable CV status   Other NRE: NONE   DID A NON-ROUTINE EVENT OCCUR? No    Event details/Postop Comments:  Pt was happy with anesthesia care.  No complications.  I will follow up with the pt if needed.           Last vitals:  Vitals Value Taken Time   BP 95/57 05/03/24 1305   Temp     Pulse 83 05/03/24 1305   Resp     SpO2 94 % 05/03/24 1309   Vitals shown include unfiled device data.    Electronically Signed By: BETH Tavarez CRNA  May 3, 2024  1:11 PM

## 2024-05-09 LAB
PATH REPORT.COMMENTS IMP SPEC: NORMAL
PATH REPORT.FINAL DX SPEC: NORMAL
PATH REPORT.GROSS SPEC: NORMAL
PATH REPORT.MICROSCOPIC SPEC OTHER STN: NORMAL
PATH REPORT.RELEVANT HX SPEC: NORMAL
PHOTO IMAGE: NORMAL

## 2024-05-09 PROCEDURE — 88342 IMHCHEM/IMCYTCHM 1ST ANTB: CPT | Mod: 26

## 2024-05-09 PROCEDURE — 88305 TISSUE EXAM BY PATHOLOGIST: CPT | Mod: 26

## 2024-05-09 PROCEDURE — 88341 IMHCHEM/IMCYTCHM EA ADD ANTB: CPT | Mod: 26

## 2024-05-09 PROCEDURE — 88312 SPECIAL STAINS GROUP 1: CPT | Mod: 26

## 2024-06-07 ENCOUNTER — OFFICE VISIT (OUTPATIENT)
Dept: FAMILY MEDICINE | Facility: CLINIC | Age: 69
End: 2024-06-07
Payer: COMMERCIAL

## 2024-06-07 VITALS
TEMPERATURE: 98.4 F | HEIGHT: 67 IN | WEIGHT: 175 LBS | BODY MASS INDEX: 27.47 KG/M2 | RESPIRATION RATE: 18 BRPM | DIASTOLIC BLOOD PRESSURE: 80 MMHG | SYSTOLIC BLOOD PRESSURE: 134 MMHG | OXYGEN SATURATION: 97 % | HEART RATE: 87 BPM

## 2024-06-07 DIAGNOSIS — R13.10 DYSPHAGIA, UNSPECIFIED TYPE: Primary | ICD-10-CM

## 2024-06-07 DIAGNOSIS — R93.89 ABNORMAL CT OF THE CHEST: ICD-10-CM

## 2024-06-07 DIAGNOSIS — K22.70 BARRETT'S ESOPHAGUS WITHOUT DYSPLASIA: ICD-10-CM

## 2024-06-07 PROCEDURE — G2211 COMPLEX E/M VISIT ADD ON: HCPCS | Performed by: FAMILY MEDICINE

## 2024-06-07 PROCEDURE — 99213 OFFICE O/P EST LOW 20 MIN: CPT | Performed by: FAMILY MEDICINE

## 2024-06-07 RX ORDER — PANTOPRAZOLE SODIUM 40 MG/1
40 TABLET, DELAYED RELEASE ORAL DAILY
COMMUNITY
Start: 2024-06-07 | End: 2024-06-07

## 2024-06-07 RX ORDER — RESPIRATORY SYNCYTIAL VIRUS VACCINE 120MCG/0.5
0.5 KIT INTRAMUSCULAR ONCE
Qty: 1 EACH | Refills: 0 | Status: CANCELLED | OUTPATIENT
Start: 2024-06-07 | End: 2024-06-07

## 2024-06-07 ASSESSMENT — PAIN SCALES - GENERAL: PAINLEVEL: NO PAIN (0)

## 2024-06-07 NOTE — PATIENT INSTRUCTIONS
Important Takeaway Points From This Visit:   Please call [phone] to schedule your imaging study.       Call your insurance about where to get the shingles vaccine if interested.   Call your insurance about where to get the RSV vaccine if interested.

## 2024-06-07 NOTE — PROGRESS NOTES
Assessment & Plan   1. Dysphagia, unspecified type  Evaluate further with speech via swallow study and esophagram. Consider GI referral and/or manometry. Patient agreeable. Continue Protonix.  - Speech Therapy  Referral; Future  - XR Video Swallow with SLP or OT - Order with Speech Therapy Referral; Future  - XR Esophagram; Future    2. Raymond's esophagus without dysplasia  Due for 6 week follow-up  - Adult GI  Referral - Procedure Only; Future    3. Abnormal CT of the chest  Repeat CT in October        Rajinder Hernández MD  [facility]    Disclaimer: This note consists of symbols derived from keyboarding, dictation and/or voice recognition software. As a result, there may be errors in the script that have gone undetected. Please consider this when interpreting information found in this chart.    The longitudinal plan of care for the diagnosis(es)/condition(s) as documented were addressed during this visit. Due to the added complexity in care, I will continue to support Martin in the subsequent management and with ongoing continuity of care.    Naseem Salazar is a 68 year old, presenting for the following health issues:  Results (Endoscopy)      6/7/2024     8:33 AM   Additional Questions   Roomed by VE     History of Present Illness       Reason for visit:  Follow up for endoscopy    He eats 2-3 servings of fruits and vegetables daily.He consumes 2 sweetened beverage(s) daily.He exercises with enough effort to increase his heart rate 30 to 60 minutes per day.  He exercises with enough effort to increase his heart rate 3 or less days per week.   He is taking medications regularly.     Following up in regards to abnormal CT scan findings from 11/29/23. See last virtual visit from 4/8/24. CT scan follow up planned October 2024.    Had endoscopy 5/3/24. Found to have Raymond's esophagus. Taking protonix. No GERD symptoms currently. Still gets meat stuck when  "eating. Causes him to choke at times. Endoscopy results reviewed without evidence of stricture etc.     Review of Systems  Constitutional, HEENT, cardiovascular, pulmonary, GI, , musculoskeletal, neuro, skin, endocrine and psych systems are negative, except as otherwise noted.      Objective    /80 (BP Location: Left arm, Patient Position: Chair, Cuff Size: Adult Regular)   Pulse 87   Temp 98.4  F (36.9  C) (Temporal)   Resp 18   Ht 1.708 m (5' 7.25\")   Wt 79.4 kg (175 lb)   SpO2 97%   BMI 27.21 kg/m    Body mass index is 27.21 kg/m .  Physical Exam  Vitals reviewed.   HENT:      Head: Normocephalic and atraumatic.   Eyes:      General:         Right eye: No discharge.         Left eye: No discharge.      Extraocular Movements: Extraocular movements intact.      Conjunctiva/sclera: Conjunctivae normal.      Pupils: Pupils are equal, round, and reactive to light.   Cardiovascular:      Rate and Rhythm: Normal rate and regular rhythm.      Heart sounds: Normal heart sounds. No murmur heard.     No friction rub.   Pulmonary:      Effort: Pulmonary effort is normal. No respiratory distress.      Breath sounds: Normal breath sounds. No wheezing or rhonchi.   Musculoskeletal:         General: No swelling.      Cervical back: Normal range of motion and neck supple. No tenderness.   Lymphadenopathy:      Cervical: No cervical adenopathy.   Skin:     General: Skin is warm.      Findings: No rash.   Neurological:      General: No focal deficit present.      Mental Status: He is alert.      Motor: No weakness.      Coordination: Coordination normal.      Gait: Gait normal.   Psychiatric:         Mood and Affect: Mood normal.         Behavior: Behavior normal.         Thought Content: Thought content normal.         Judgment: Judgment normal.        5/3/24 Biopsy results  Final Diagnosis   A.  Stomach, antrum, biopsy:  - Mild chronic inactive gastritis.  - Negative for intestinal metaplasia, gastric epithelial " dysplasia, or malignancy.  - Negative for Helicobacter pylori forms.  - Sampling includes: Gastric oxyntic mucosa.     B.  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  - Severe ulcerative esophagitis with reactive changes, consistent with reflux esophagitis.  - Negative for dysplasia or malignancy.  - Negative for HSV or CMV viral inclusions.  - Negative for fungal forms.  - Sampling includes: Columnar mucosa and necroinflammatory ulcer debris.         Signed Electronically by: Rajinder Hernández MD

## 2024-06-14 ENCOUNTER — TELEPHONE (OUTPATIENT)
Dept: GASTROENTEROLOGY | Facility: CLINIC | Age: 69
End: 2024-06-14
Payer: COMMERCIAL

## 2024-06-14 NOTE — TELEPHONE ENCOUNTER
Endoscopy Scheduling Screen  Preferred Pharmacy:    [facility] [address] [place] - [address] [facility]  [address] [facility] [address]  Phone: [phone] Fax: [phone]      Final Scheduling Details     Procedure scheduled  Upper endoscopy (EGD)    Surgeon:  EDER     Date of procedure:  07/31/2024     Pre-OP / PAC:   No - Not required for this site.    Location  PH  NEXT AVAILABLE    Sedation   MAC/Deep Sedation  SUBOXONE USE      Patient Reminders:   You will receive a call from a Nurse to review instructions and health history.  This assessment must be completed prior to your procedure.  Failure to complete the Nurse assessment may result in the procedure being cancelled.      On the day of your procedure, please designate an adult(s) who can drive you home stay with you for the next 24 hours. The medicines used in the exam will make you sleepy. You will not be able to drive.      You cannot take public transportation, ride share services, or non-medical taxi service without a responsible caregiver.  Medical transport services are allowed with the requirement that a responsible caregiver will receive you at your destination.  We require that drivers and caregivers are confirmed prior to your procedure.

## 2024-06-17 PROBLEM — Z71.89 ADVANCED DIRECTIVES, COUNSELING/DISCUSSION: Status: RESOLVED | Noted: 2017-01-31 | Resolved: 2024-06-17

## 2024-07-30 ENCOUNTER — ANESTHESIA EVENT (OUTPATIENT)
Dept: GASTROENTEROLOGY | Facility: CLINIC | Age: 69
End: 2024-07-30
Payer: COMMERCIAL

## 2024-07-30 NOTE — H&P
[facility] Anesthesia Pre-op History and Physical    Martin Armstrong MRN# [id number]   Age: 68 year old YOB: 1955      Date of Surgery: 7/31/2024 Location [facility]      Date of Exam 7/31/2024 Facility (In hospital)       Home clinic: [facility]  Primary care provider: Rajinder Hernández         Chief Complaint and/or Reason for Procedure:   No chief complaint on file.  EGD  Follow up esophagitis       Active problem list:     Patient Active Problem List    Diagnosis Date Noted    Opioid dependence in remission (H) 03/06/2024     Priority: Medium    Major depressive disorder, recurrent episode, moderate (H) 04/28/2022     Priority: Medium    Elevated fasting glucose 12/06/2018     Priority: Medium    Spinal stenosis of lumbar region without neurogenic claudication 10/04/2018     Priority: Medium    Status post left partial knee replacement 12/21/2017     Priority: Medium    Ganglion, finger of right hand 12/21/2017     Priority: Medium    Osteoarthritis of left hand 12/05/2014     Priority: Medium    Hypothyroidism 11/21/2014     Priority: Medium    Major depressive disorder 11/21/2014     Priority: Medium    Diverticulosis      Priority: Medium    Abnormal gait 11/11/2011     Priority: Medium    DJD (degenerative joint disease) of knee      Priority: Medium    CARDIOVASCULAR SCREENING; LDL GOAL LESS THAN 160 09/13/2011     Priority: Medium    Anxiety state 11/24/2010     Priority: Medium     Formatting of this note might be different from the original.  Anxiety      Arthropathy 11/24/2010     Priority: Medium     Formatting of this note might be different from the original.  Arthritis      Backache 11/24/2010     Priority: Medium     Formatting of this note might be different from the original.  Backache              Medications (include herbals and vitamins):   Any Plavix use in the last 7 days? No     No current facility-administered  medications for this encounter.     Current Outpatient Medications   Medication Sig Dispense Refill    aspirin-acetaminophen-caffeine (EXCEDRIN MIGRAINE) 250-250-65 MG tablet Take 1 tablet by mouth every 6 hours as needed for headaches      buprenorphine HCl-naloxone HCl (SUBOXONE) 8-2 MG per film       buprenorphine-naloxone (SUBOXONE) 8-2 MG SUBL sublingual tablet       citalopram (CELEXA) 20 MG tablet Take 1 tablet (20 mg) by mouth daily 90 tablet 3    hydroCHLOROthiazide 12.5 MG tablet Take 1 tablet (12.5 mg) by mouth daily 90 tablet 3    levothyroxine (SYNTHROID/LEVOTHROID) 112 MCG tablet Take 1 tablet (112 mcg) by mouth daily 90 tablet 3    Multiple Vitamins-Minerals (MULTIVITAL PO)       pantoprazole (PROTONIX) 40 MG EC tablet Take 1 tablet (40 mg) by mouth daily 30 tablet 3             Allergies:    No Known Allergies  Allergy to Latex? No  Allergy to tape?   No  Intolerances:             Physical Exam:   All vitals have been reviewed  No data found.  No intake/output data recorded.  Lungs:   No increased work of breathing, good air exchange, clear to auscultation bilaterally, no crackles or wheezing     Cardiovascular:   Normal apical impulse, regular rate and rhythm, normal S1 and S2, no S3 or S4, and no murmur noted             Lab / Radiology Results:            Anesthetic risk and/or ASA classification:       Nomi Flores MD

## 2024-07-31 ENCOUNTER — HOSPITAL ENCOUNTER (OUTPATIENT)
Facility: CLINIC | Age: 69
Discharge: HOME | End: 2024-07-31
Attending: INTERNAL MEDICINE | Admitting: INTERNAL MEDICINE
Payer: COMMERCIAL

## 2024-07-31 ENCOUNTER — ANESTHESIA (OUTPATIENT)
Dept: GASTROENTEROLOGY | Facility: CLINIC | Age: 69
End: 2024-07-31
Payer: COMMERCIAL

## 2024-07-31 VITALS
HEART RATE: 75 BPM | HEIGHT: 67 IN | RESPIRATION RATE: 15 BRPM | DIASTOLIC BLOOD PRESSURE: 71 MMHG | SYSTOLIC BLOOD PRESSURE: 133 MMHG | WEIGHT: 175 LBS | BODY MASS INDEX: 27.47 KG/M2 | TEMPERATURE: 97.8 F | OXYGEN SATURATION: 95 %

## 2024-07-31 LAB — UPPER GI ENDOSCOPY: NORMAL

## 2024-07-31 PROCEDURE — 370N000017 HC ANESTHESIA TECHNICAL FEE, PER MIN: Performed by: INTERNAL MEDICINE

## 2024-07-31 PROCEDURE — 88305 TISSUE EXAM BY PATHOLOGIST: CPT | Mod: 26 | Performed by: PATHOLOGY

## 2024-07-31 PROCEDURE — 250N000009 HC RX 250: Performed by: NURSE ANESTHETIST, CERTIFIED REGISTERED

## 2024-07-31 PROCEDURE — 250N000011 HC RX IP 250 OP 636: Performed by: NURSE ANESTHETIST, CERTIFIED REGISTERED

## 2024-07-31 PROCEDURE — 88305 TISSUE EXAM BY PATHOLOGIST: CPT | Mod: TC | Performed by: INTERNAL MEDICINE

## 2024-07-31 PROCEDURE — 258N000003 HC RX IP 258 OP 636: Performed by: NURSE ANESTHETIST, CERTIFIED REGISTERED

## 2024-07-31 PROCEDURE — 43239 EGD BIOPSY SINGLE/MULTIPLE: CPT | Performed by: INTERNAL MEDICINE

## 2024-07-31 RX ORDER — ONDANSETRON 2 MG/ML
4 INJECTION INTRAMUSCULAR; INTRAVENOUS EVERY 30 MIN PRN
Status: DISCONTINUED | OUTPATIENT
Start: 2024-07-31 | End: 2024-07-31 | Stop reason: HOSPADM

## 2024-07-31 RX ORDER — FENTANYL CITRATE 50 UG/ML
25 INJECTION, SOLUTION INTRAMUSCULAR; INTRAVENOUS
Status: DISCONTINUED | OUTPATIENT
Start: 2024-07-31 | End: 2024-07-31 | Stop reason: HOSPADM

## 2024-07-31 RX ORDER — SODIUM CHLORIDE, SODIUM LACTATE, POTASSIUM CHLORIDE, CALCIUM CHLORIDE 600; 310; 30; 20 MG/100ML; MG/100ML; MG/100ML; MG/100ML
INJECTION, SOLUTION INTRAVENOUS CONTINUOUS
Status: DISCONTINUED | OUTPATIENT
Start: 2024-07-31 | End: 2024-07-31 | Stop reason: HOSPADM

## 2024-07-31 RX ORDER — COVID-19 ANTIGEN TEST
220 KIT MISCELLANEOUS PRN
COMMUNITY

## 2024-07-31 RX ORDER — LIDOCAINE 40 MG/G
CREAM TOPICAL
Status: DISCONTINUED | OUTPATIENT
Start: 2024-07-31 | End: 2024-07-31 | Stop reason: HOSPADM

## 2024-07-31 RX ORDER — ONDANSETRON 4 MG/1
4 TABLET, ORALLY DISINTEGRATING ORAL EVERY 30 MIN PRN
Status: DISCONTINUED | OUTPATIENT
Start: 2024-07-31 | End: 2024-07-31 | Stop reason: HOSPADM

## 2024-07-31 RX ORDER — LIDOCAINE HYDROCHLORIDE 20 MG/ML
INJECTION, SOLUTION INFILTRATION; PERINEURAL PRN
Status: DISCONTINUED | OUTPATIENT
Start: 2024-07-31 | End: 2024-07-31

## 2024-07-31 RX ORDER — PROPOFOL 10 MG/ML
INJECTION, EMULSION INTRAVENOUS PRN
Status: DISCONTINUED | OUTPATIENT
Start: 2024-07-31 | End: 2024-07-31

## 2024-07-31 RX ORDER — PROPOFOL 10 MG/ML
INJECTION, EMULSION INTRAVENOUS CONTINUOUS PRN
Status: DISCONTINUED | OUTPATIENT
Start: 2024-07-31 | End: 2024-07-31

## 2024-07-31 RX ORDER — DEXAMETHASONE SODIUM PHOSPHATE 10 MG/ML
4 INJECTION, SOLUTION INTRAMUSCULAR; INTRAVENOUS
Status: DISCONTINUED | OUTPATIENT
Start: 2024-07-31 | End: 2024-07-31 | Stop reason: HOSPADM

## 2024-07-31 RX ORDER — NALOXONE HYDROCHLORIDE 0.4 MG/ML
0.1 INJECTION, SOLUTION INTRAMUSCULAR; INTRAVENOUS; SUBCUTANEOUS
Status: DISCONTINUED | OUTPATIENT
Start: 2024-07-31 | End: 2024-07-31 | Stop reason: HOSPADM

## 2024-07-31 RX ADMIN — LIDOCAINE HYDROCHLORIDE 0.1 ML: 10 INJECTION, SOLUTION EPIDURAL; INFILTRATION; INTRACAUDAL; PERINEURAL at 07:02

## 2024-07-31 RX ADMIN — PROPOFOL 300 MCG/KG/MIN: 10 INJECTION, EMULSION INTRAVENOUS at 07:26

## 2024-07-31 RX ADMIN — SODIUM CHLORIDE, POTASSIUM CHLORIDE, SODIUM LACTATE AND CALCIUM CHLORIDE: 600; 310; 30; 20 INJECTION, SOLUTION INTRAVENOUS at 07:03

## 2024-07-31 RX ADMIN — PROPOFOL 100 MG: 10 INJECTION, EMULSION INTRAVENOUS at 07:26

## 2024-07-31 RX ADMIN — LIDOCAINE HYDROCHLORIDE 50 MG: 20 INJECTION, SOLUTION INFILTRATION; PERINEURAL at 07:26

## 2024-07-31 ASSESSMENT — ACTIVITIES OF DAILY LIVING (ADL)
ADLS_ACUITY_SCORE: 40
ADLS_ACUITY_SCORE: 40

## 2024-07-31 NOTE — DISCHARGE INSTRUCTIONS
[facility]    Home Care Following Endoscopy          Activity:  You have just undergone an endoscopic procedure usually performed with moderate sedation.  Do not work or operate machinery (including a car) for at least 12 hours.    I encourage you to walk and attempt to pass this air as soon as possible.    Diet:  Return to the diet you were on before your procedure but eat lightly for the first 12-24 hours.  Drink plenty of water.  Resume any regular medications unless otherwise advised by your physician.  Please begin any new medication prescribed as a result of your procedure as directed by your physician.   If you had any biopsy or polyp removed please refrain from aspirin or aspirin products for 2 days.  If on Coumadin please restart as instructed by your physician.   Pain:  You may take Tylenol as needed for pain.  Expected Recovery:  You can expect some mild abdominal fullness and/or discomfort due to the air used to inflate your intestinal tract. It is also normal to have a mild sore throat after upper endoscopy.    Call Your Physician if You Have:  After Upper Endoscopy:  Shoulder, back or chest pain.  Difficulty breathing or swallowing.  Vomiting blood.      Any questions or concerns about your recovery, please call [phone] or after hours [facility] ([phone]) Nurse Advice Line.    Follow-up Care:  You did have polyps/biopsy tissue sample(s) removed.  The polyps/biopsy tissue sample(s) will be sent to pathology.    You should receive letter in your My Chart from Dr. Flores with your results within 1-2 weeks. If you do not participate in My Chart a physical letter will come in the mail in 2-3 weeks.  Please call if you have not received a notification of your results.  If asked to return to clinic please make an appointment 1 week after your procedure.  Call [phone].

## 2024-07-31 NOTE — ANESTHESIA POSTPROCEDURE EVALUATION
Patient: Martin Armstrong    Procedure: Procedure(s):  ESOPHAGOGASTRODUODENOSCOPY, WITH BIOPSY       Anesthesia Type:  MAC    Note:  Disposition: Outpatient   Postop Pain Control: Uneventful            Sign Out: Well controlled pain   PONV: No   Neuro/Psych: Uneventful            Sign Out: Acceptable/Baseline neuro status   Airway/Respiratory: Uneventful            Sign Out: Acceptable/Baseline resp. status   CV/Hemodynamics: Uneventful            Sign Out: Acceptable CV status   Other NRE: NONE   DID A NON-ROUTINE EVENT OCCUR? No    Event details/Postop Comments:  Pt was happy with anesthesia care.  No complications.  I will follow up with the pt if needed.           Last vitals:  Vitals Value Taken Time   BP 86/49 07/31/24 0740   Temp     Pulse 77 07/31/24 0740   Resp     SpO2 96 % 07/31/24 0742   Vitals shown include unfiled device data.    Electronically Signed By: BETH Polanco CRNA  July 31, 2024  7:42 AM

## 2024-07-31 NOTE — LETTER
August 5, 2024      Martin Armstrong  [address] [facility] [address]        Dear ,    We are writing to inform you of your test results.    Your test results fall within the expected range(s) or remain unchanged from previous results.  Please continue with current treatment plan.    A repeat EGD in 3--5 yrs for monitoring the Raymond's is suggested.    Resulted Orders   Surgical Pathology Exam   Result Value Ref Range    Case Report       Surgical Pathology Report                         Case: [id number]                                  Authorizing Provider:  Nomi Flores MD        Collected:           07/31/2024 07:30 AM          Ordering Location:     [facility]          Received:            07/31/2024 08:17 AM                                 [facility]                                                          Pathologist:           Jamil Matamoros MD                                                          Specimen:    Esophagus, esophageal biopsy irregular Z line                                              Final Diagnosis       A. Esophagus, distal, biopsy:  -Gastric type columnar mucosa with prominent intestinal metaplasia and reactive epithelial changes (see comment)  -Adjacent squamous mucosa with reactive epithelial changes of the type seen in reflux esophagitis.  -Negative for acute or eosinophilic esophagitis.  -Negative for dysplasia or malignancy.        Comment       This biopsy shows gastric-type mucosa with scattered goblet cells.  The diagnosis in this case depends on the location of this biopsy.  If this biopsy was taken from the tubular esophagus,  at least 1 cm above the gastric folds it shows Raymond's mucosa of the distinctive type.  If this biopsy was taken from the gastric cardia, it represents intestinal metaplasia of the gastric cardia. Negative for dysplasia.    Reference: Billy LEE et al. [facility] Clinical Guideline: diagnosis and management of Raymond's  esophagus. Am. J. Gasteroentero. 2016;111:30-50      Clinical Information       Procedure:  ESOPHAGOGASTRODUODENOSCOPY, WITH BIOPSY  Pre-op Diagnosis: Raymond's esophagus without dysplasia [K22.70]  Post-op Diagnosis: K22.70 - Raymond's esophagus without dysplasia [ICD-10-CM]      Gross Description       A(1). Esophagus, esophageal biopsy irregular Z line:  The specimen is received in formalin, labeled with the patient's name, medical record number and other identifying information and designated  esophagus biopsy, irregular Z-line . It consists of two tan soft tissue fragments, each 0.2 cm. Entirely submitted in one cassette.   (JIMBO Doe) 8/1/2024 7:51 AM       Microscopic Description       Microscopic examination was performed.      Performing Labs       The technical component of this testing was completed at [facility].    Stain controls for all stains resulted within this report have been reviewed and show appropriate reactivity.       Case Images         If you have any questions or concerns, please call the clinic at the number listed above.       Sincerely,      Nomi Flores MD

## 2024-07-31 NOTE — ANESTHESIA PREPROCEDURE EVALUATION
Anesthesia Pre-Procedure Evaluation    Patient: Martin Armstrong   MRN: [id number] : 1955        Procedure : Procedure(s):  Esophagoscopy, gastroscopy, duodenoscopy (EGD), combined          Past Medical History:   Diagnosis Date     Chemical dependency (H)     remission- 2008, suboxone     Diverticulosis      DJD (degenerative joint disease) of knee      Thyroid disease       Past Surgical History:   Procedure Laterality Date     ARTHROPLASTY KNEE UNICOMPARTMENT  2011    Procedure:ARTHROPLASTY KNEE UNICOMPARTMENT; LEFT KNEE UNICOMPARTMENT ARTHROPLASTY (FREEMAN)^; Surgeon:SHABBIR RADER; Location:[facility] OR     ARTHROSCOPY KNEE RT/LT  2006    left     COLONOSCOPY  3/27/08    due in      COLONOSCOPY WITH CO2 INSUFFLATION N/A 2018    Procedure: screening colonoscopy;  Surgeon: Adrian Hawthorne MD;  Location: [facility] OR     ESOPHAGOSCOPY, GASTROSCOPY, DUODENOSCOPY (EGD), COMBINED N/A 5/3/2024    Procedure: ESOPHAGOGASTRODUODENOSCOPY, WITH BIOPSY;  Surgeon: Tano Callaway MD;  Location: [facility]      No Known Allergies   Social History     Tobacco Use     Smoking status: Never     Passive exposure: Never     Smokeless tobacco: Never   Substance Use Topics     Alcohol use: No      Wt Readings from Last 1 Encounters:   24 79.4 kg (175 lb)        Anesthesia Evaluation   Pt has had prior anesthetic. Type: General and MAC.        ROS/MED HX  ENT/Pulmonary: Comment: H/O SOB with exertion       Neurologic: Comment: Abnormal gait      Cardiovascular:     (+)  - -   -  - -                                 Previous cardiac testing   Echo: Date:  Results:  No ECG evidence of ischemia.  No stress induced regional wall motion abnormalities.  Normal resting LV function with EF of approximately 60-65%; normal response   to exercise with increase to approximately 70-75%.  Exercise tolerance is average for age.  PatientHeight: 69 in  PatientWeight: 198 lbs  SystolicPressure: 155 mmHg  DiastolicPressure: 93  mmHg  HeartRate: 70 bpm  BSA 2.1 m^2     Stress Findings  Maximum workload 150 maya.  Patient was given 4.5ml mixture of 1.5ml Definity and 8.5ml saline.  IV start location LAC .  No electrocardiographic evidence of ischemia.  Target Heart Rate was achieved.  Normal heart rate response to exercise.  Normal blood pressure response to exercise.  Exercise tolerance is average for age.     Baseline EKG/Symptoms  The baseline ECG displays normal sinus rhythm.     Left Ventricle  Left ventricular systolic function is normal.     Aortic Valve  The aortic valve is normal in structure and function.     Mitral Valve  The mitral valve is normal in structure and function.     Tricuspid Valve  The tricuspid valve is normal in structure and function.  There is trace tricuspid regurgitation.     Right Ventricle  The right ventricular systolic function is normal.       Stress Test:  Date: Results:    ECG Reviewed:  Date: 7/12/2021 Results:    Cath:  Date: Results:      METS/Exercise Tolerance:     Hematologic:  - neg hematologic  ROS     Musculoskeletal: Comment: Chronic low back pain   DJD  (+)  arthritis,             GI/Hepatic: Comment: Diverticulosis      Renal/Genitourinary:  - neg Renal ROS     Endo:     (+)          thyroid problem, hypothyroidism,           Psychiatric/Substance Use: Comment: H/O opioid abuse currently taking  Suboxone    (+) psychiatric history anxiety and depression  H/O chronic opiod use .     Infectious Disease:  - neg infectious disease ROS     Malignancy:  - neg malignancy ROS     Other:            Physical Exam    Airway  airway exam normal      Mallampati: II   TM distance: > 3 FB   Neck ROM: full   Mouth opening: > 3 cm    Respiratory Devices and Support         Dental       (+) Modest Abnormalities - crowns, retainers, 1 or 2 missing teeth    B=Bridge, C=Chipped, L=Loose, M=Missing    Cardiovascular   cardiovascular exam normal       Rhythm and rate: regular and normal     Pulmonary   pulmonary  "exam normal        breath sounds clear to auscultation         OUTSIDE LABS:  CBC:   Lab Results   Component Value Date    WBC 7.3 03/06/2024    WBC 7.5 01/11/2023    HGB 11.9 (L) 03/06/2024    HGB 12.3 (L) 01/11/2023    HCT 37.0 (L) 03/06/2024    HCT 37.7 (L) 01/11/2023     03/06/2024     01/11/2023     BMP:   Lab Results   Component Value Date     03/06/2024     01/11/2023    POTASSIUM 4.1 03/06/2024    POTASSIUM 4.2 01/11/2023    CHLORIDE 103 03/06/2024    CHLORIDE 105 01/11/2023    CO2 30 (H) 03/06/2024    CO2 29 01/11/2023    BUN 22.0 03/06/2024    BUN 22 01/11/2023    CR 1.05 03/06/2024    CR 1.00 01/11/2023    GLC 99 03/06/2024    GLC 96 01/11/2023     COAGS: No results found for: \"PTT\", \"INR\", \"FIBR\"  POC: No results found for: \"BGM\", \"HCG\", \"HCGS\"  HEPATIC:   Lab Results   Component Value Date    ALBUMIN 4.5 03/06/2024    PROTTOTAL 7.6 03/06/2024    ALT 26 03/06/2024    AST 32 03/06/2024    ALKPHOS 84 03/06/2024    BILITOTAL 0.2 03/06/2024     OTHER:   Lab Results   Component Value Date    SUSIE 9.5 03/06/2024    TSH 2.43 03/06/2024    T4 1.10 02/11/2021       Anesthesia Plan    ASA Status:  2    NPO Status:  NPO Appropriate    Anesthesia Type: MAC.     - Reason for MAC: straight local not clinically adequate   Induction: Intravenous, Propofol.   Maintenance: TIVA.        Consents    Anesthesia Plan(s) and associated risks, benefits, and realistic alternatives discussed. Questions answered and patient/representative(s) expressed understanding.     - Discussed:     - Discussed with:  Patient      - Extended Intubation/Ventilatory Support Discussed: No.      - Patient is DNR/DNI Status: No     Use of blood products discussed: No .     Postoperative Care    Pain management: IV analgesics.   PONV prophylaxis: Background Propofol Infusion     Comments:    Other Comments: The risks and benefits of anesthesia, and the alternatives where applicable, have been discussed with the patient, and " they wish to proceed.              BETH Polanco CRNA    I have reviewed the pertinent notes and labs in the chart from the past 30 days and (re)examined the patient.  Any updates or changes from those notes are reflected in this note.

## 2024-07-31 NOTE — ANESTHESIA CARE TRANSFER NOTE
Patient: Martin Armstrong    Procedure: Procedure(s):  ESOPHAGOGASTRODUODENOSCOPY, WITH BIOPSY       Diagnosis: Raymond's esophagus without dysplasia [K22.70]  Diagnosis Additional Information: No value filed.    Anesthesia Type:   MAC     Note:    Oropharynx: oropharynx clear of all foreign objects and spontaneously breathing  Level of Consciousness: unresponsive  Oxygen Supplementation: nasal cannula  Level of Supplemental Oxygen (L/min / FiO2): 3  Independent Airway: airway patency satisfactory and stable  Dentition: dentition unchanged  Vital Signs Stable: post-procedure vital signs reviewed and stable  Report to RN Given: handoff report given  Patient transferred to: Phase II    Handoff Report: Identifed the Patient, Identified the Reponsible Provider, Reviewed the pertinent medical history, Discussed the surgical course, Reviewed Intra-OP anesthesia mangement and issues during anesthesia, Set expectations for post-procedure period and Allowed opportunity for questions and acknowledgement of understanding      Vitals:  Vitals Value Taken Time   BP     Temp     Pulse     Resp     SpO2         Electronically Signed By: BETH Polanco CRNA  July 31, 2024  7:41 AM

## 2024-08-09 ENCOUNTER — HOSPITAL ENCOUNTER (OUTPATIENT)
Dept: GENERAL RADIOLOGY | Facility: CLINIC | Age: 69
Discharge: HOME | End: 2024-08-09
Attending: FAMILY MEDICINE
Payer: COMMERCIAL

## 2024-08-09 ENCOUNTER — THERAPY VISIT (OUTPATIENT)
Dept: SPEECH THERAPY | Facility: CLINIC | Age: 69
End: 2024-08-09
Attending: FAMILY MEDICINE
Payer: COMMERCIAL

## 2024-08-09 DIAGNOSIS — R13.10 DYSPHAGIA, UNSPECIFIED TYPE: ICD-10-CM

## 2024-08-09 PROCEDURE — 74221 X-RAY XM ESOPHAGUS 2CNTRST: CPT

## 2024-08-09 PROCEDURE — 74230 X-RAY XM SWLNG FUNCJ C+: CPT

## 2024-08-09 PROCEDURE — 250N000013 HC RX MED GY IP 250 OP 250 PS 637: Performed by: RADIOLOGY

## 2024-08-09 PROCEDURE — 92611 MOTION FLUOROSCOPY/SWALLOW: CPT | Mod: GN

## 2024-08-09 RX ORDER — BARIUM SULFATE 400 MG/ML
SUSPENSION ORAL ONCE
Status: DISCONTINUED | OUTPATIENT
Start: 2024-08-09 | End: 2024-08-09

## 2024-08-09 RX ORDER — BARIUM SULFATE 400 MG/ML
SUSPENSION ORAL ONCE
Status: COMPLETED | OUTPATIENT
Start: 2024-08-09 | End: 2024-08-09

## 2024-08-09 RX ADMIN — ANTACID/ANTIFLATULENT 4 G: 380; 550; 10; 10 GRANULE, EFFERVESCENT ORAL at 10:21

## 2024-08-09 RX ADMIN — BARIUM SULFATE: 400 SUSPENSION ORAL at 10:53

## 2024-08-09 NOTE — RESULT ENCOUNTER NOTE
Ezekiel Salazar,    If you have not viewed these results on [employer] within 3 days, we will use an alternative method to contact you. We will contact you via the following protocol:    - Via letter if your results are normal.  - Via phone ([phone]) if your results are abnormal.     Here are my comments about your recent results:    Swallow study was normal.    Please call the clinic ([phone]), or message us on [employer] with any questions you may have.     Have a great day,    Dr. Merchant

## 2024-08-09 NOTE — LETTER
August 12, 2024      Martin Armstrong  [address] [facility] [address]              Martin,     Here are my comments about your recent results:     Swallow study was normal.     Please call the clinic ([phone]), or message us on [employer] with any questions you may have.     Have a great day,     Dr. Merchant

## 2024-08-09 NOTE — RESULT ENCOUNTER NOTE
Ezekiel Salazar,    If you have not viewed these results on [employer] within 3 days, we will use an alternative method to contact you. We will contact you via the following protocol:    - Via letter if your results are normal.  - Via phone ([phone]) if your results are abnormal.     Here are my comments about your recent results:    Esophagus test appears normal. You have a small hernia through your breathing muscle (diaphragm), but not causing significant issues.    Continue on protonix.    Your GI provider wanted to see you for another EGD in September due to the haji's esophagus findings on your previous camera study.    Please call the clinic ([phone]), or message us on [employer] with any questions you may have.     Have a great day,    Dr. Merchant

## 2024-08-13 NOTE — PROGRESS NOTES
SPEECH LANGUAGE PATHOLOGY EVALUATION              Subjective        Date of onset: 06/07/24      Prior diagnostic imaging/testing results:     None  Prior therapy history for the same diagnosis illness or injury:    N/a    Living Environment  Social support:   Wife    Patient goals for therapy:  See how swallowing is going.       Objective     SWALLOW EVALUTION  Dysphagia history: Martin is 68-year-old male who was referred for a videofluoroscopic swallow evaluation with concerns with eating meat. Pt has suspected haji's esophagus per chart review of esophagogastroduodenoscopy.   Current Diet/Method of Nutritional Intake: thin liquids (level 0), regular diet        VIDEOFLUOROSCOPIC SWALLOW STUDY  Radiologist: Dr. Rudd  Views Taken: left lateral   Physical location of procedure: [place]  Patient sitting upright on chair/stool     VFSS textures trialed:   VFSS Eval: Thin Liquids  Mode of Presentation: cup, straw   Order of Presentation: 1,2,3,4,5  Preparatory Phase: WFL  Oral Phase: WFL  Bolus Location When Swallow Initiated: posterior angle of ramus, valleculae  Pharyngeal Phase: WFL, residue in valleculae, residue in pyriform sinus  Rosenbeck's Penetration Aspiration Scale: 1 - no aspiration, contrast does not enter airway  Response to Aspiration:  N/a  Strategies and Compensations: not applicable  Diagnostic Statement: Pt swallow WFL. No penetration or aspiration noted. Swallow was initiated at the level of the posterior angle of ramus with larger swallows full initiation of swallow did not occur until the level of valleculae, considered WFL. Trace amounts of residue remaining in pyriform sinus and valleculae, considered WFL.     VFSS Eval: Mildly Thick Liquids  Mode of Presentation: cup, self-fed   Order of Presentation: 6,7  Preparatory Phase: WFL  Oral Phase: WFL  Bolus Location When Swallow Initiated: posterior angle of ramus  Pharyngeal Phase: WFL, residue in valleculae,  residue in pyriform sinus  Rosenbeck's Penetration Aspiration Scale: 1 - no aspiration, contrast does not enter airway  Response to Aspiration:  N/a  Strategies and Compensations: not applicable  Diagnostic Statement: Pt swallow WFL. No penetration or aspiration noted. Swallow was initiated at the level of the posterior angle of ramus.Trace amounts of residue remaining in pyriform sinus and valleculae, considered WFL.     VFSS Eval: Purees  Mode of Presentation: spoon, self-fed   Order of Presentation: 8,9  Preparatory Phase: WFL  Oral Phase: WFL, residue in oral cavity  Bolus Location When Swallow Initiated: posterior angle of ramus  Pharyngeal Phase: WFL  Rosenbeck s Penetration Aspiration Scale: 1 - no aspiration, contrast does not enter airway  Response to Aspiration:  N/a  Strategies and Compensations: not applicable  Diagnostic Statement: Pt swallow WFL. No penetration or aspiration noted. Swallow was initiated at the level of the posterior angle of ramus. Trace amounts of residue considered WFL.    VFSS Eval: Soft & Bite Sized  Mode of Presentation: spoon, self-fed   Order of Presentation: 10,11  Preparatory Phase: WFL  Oral Phase: WFL  Bolus Location When Swallow Initiated: posterior angle of ramus  Pharyngeal Phase: WFL, residue in valleculae, residue in pyriform sinus  Rosenbeck s Penetration Aspiration Scale: 1 - no aspiration, contrast does not enter airway  Response to Aspiration:  N/a  Strategies and Compensations: not applicable  Diagnostic Statement: Pt swallow WFL. No penetration or aspiration noted. Swallow was initiated at the level of the posterior angle of ramus. During trial 11 bolus did not clear from the pharynx after initial swallow. Pt produced a timely second swallow and bolus cleared with trace amount of residue in the valleculae and pyriform sinus. Trial 11 was a larger bolus size than trial 10.    VFSS Eval: Solids  Mode of Presentation: spoon, self-fed   Order of Presentation:  12,13  Preparatory Phase: WFL  Oral Phase: WFL, residue in oral cavity  Bolus Location When Swallow Initiated: posterior angle of ramus  Pharyngeal Phase: WFL   Rosenbeck s Penetration Aspiration Scale: 1 - no aspiration, contrast does not enter airway  Response to Aspiration:  N/a  Strategies and Compensations: not applicable  Diagnostic Statement: Pt swallow WFL. No penetration or aspiration noted. Swallow was initiated at the level of the posterior angle of ramus. Residue in oral cavity considered trace amount and WFL.    VFSS Eval: Barium Tablet  Mode of Presentation: cup, self-fed   Order of Presentation: 14  Preparatory Phase: WFL  Oral Phase: WFL  Bolus Location When Swallow Initiated: posterior angle of ramus  Pharyngeal Phase: WFL  Rosenbeck s Penetration Aspiration Scale: 1 - no aspiration, contrast does not enter airway  Response to Aspiration:  N/a  Strategies and Compensations: not applicable  Diagnostic Statement: Pt swallow WFL. No penetration or aspiration noted. Swallow was initiated at the level of the posterior angle of ramus.     ESOPHAGEAL PHASE OF SWALLOW  please refer to radiologist's report for details     SWALLOW ASSESSMENT CLINICAL IMPRESSIONS AND RATIONALE  Diet Consistency Recommendations: thin liquids (level 0), regular diet    Recommended Feeding/Eating Techniques: small bolus size   Medication Administration Recommendations: continue to take medication with thin liquids.  Instrumental Assessment Recommendations: instrumental evaluation not recommended at this time     Assessment & Plan   CLINICAL IMPRESSIONS   Medical Diagnosis: Dysphagia, unspecified type (R13.10)    Treatment Diagnosis: Functional Swallow   Impression/Assessment: Completed VFSS with patient in upright position and from left lateral view. Patient fed himself with cup, spoon, and straw and followed directions. Patient followed directions without any difficulties. Trials included thin liquids, mildly thick liquids, and  pureed, soft, and regular food trials and a 1.3 cm barium tablet. Swallow is within functional limits. No aspiration or penetration was noted. Overall, very minimal swallowing difficulties were noted in the oral, transit, and pharyngeal stages of the swallow. Throughout all trials, patient consistently retained minimal bolus, liquid or solid, within oral cavity. Second swallows noted during trial of large bolus of soft and bite size, no penetra  AP propulsion WNLs and effective.  This did not vary with larger vs smaller amounts.  Large pureed food trial resulted in patient effectively managing bolus into 2 separate swallows without difficulty.  Swallow was initiated in timely manner with no evidence of aspiration.  Bolus was effectively masticated and held in center of oral cavity until swallow was initiated and then swallow was triggered in timely manner with pharyngeal area clearing well following all trials.  Across all consistencies, there was very minimal oral or pharyngeal residual noted and this is to be considered WFLs. No difficulties swallowing barium tablet with consecutive sips of water. Recommend a diet consistency of regular foods and thin liquids.  Meds to be taken whole with thin liquids. No further skilled speech language  intervention needed.  Recommend GI consult.     PLAN OF CARE  Treatment Interventions:  Eval only      Frequency of Treatment: Eval only  Duration of Treatment: Eval only     Recommended Referrals to Other Professionals:  GI  Education Assessment:        Risks and benefits of evaluation/treatment have been explained.   Patient/Family/caregiver agrees with Plan of Care.     Evaluation Time:              Signing Clinician: NICKI DUNN      [facility]                                                                                   OUTPATIENT SPEECH LANGUAGE PATHOLOGY      PLAN OF TREATMENT FOR OUTPATIENT REHABILITATION   Patient's Last Name,  First Name, UMAIR ArmstrongMartin YOB: 1955   Provider's Name   [facility]   Medical Record No.  [id number]     Onset Date: 06/07/24 Start of Care Date: 08/09/24     Medical Diagnosis:  Dysphagia, unspecified type (R13.10)      SLP Treatment Diagnosis: Functional Swallow  Plan of Treatment  Frequency/Duration: Eval only  / Eval only     Certification date from 08/09/24   To 08/09/24          See note for plan of treatment details and functional goals     DAMON LOOMIS, SLP                         I CERTIFY THE NEED FOR THESE SERVICES FURNISHED UNDER        THIS PLAN OF TREATMENT AND WHILE UNDER MY CARE     (Physician attestation of this document indicates review and certification of the therapy plan).              Referring Provider:  Rajinder Hernández    Initial Assessment  See Epic Evaluation- 08/09/24

## 2024-08-27 DIAGNOSIS — K21.00 GASTROESOPHAGEAL REFLUX DISEASE WITH ESOPHAGITIS WITHOUT HEMORRHAGE: ICD-10-CM

## 2024-08-27 NOTE — TELEPHONE ENCOUNTER
Protonix       Last Written Prescription Date:  5/3/24  Last Fill Quantity: 30,   # refills: 3  Last Office Visit:   Future Office visit:       Routing refill request to provider for review/approval because:

## 2024-08-28 RX ORDER — PANTOPRAZOLE SODIUM 40 MG/1
40 TABLET, DELAYED RELEASE ORAL DAILY
Qty: 90 TABLET | Refills: 3 | Status: SHIPPED | OUTPATIENT
Start: 2024-08-28

## 2025-02-20 DIAGNOSIS — E03.9 HYPOTHYROIDISM, UNSPECIFIED TYPE: ICD-10-CM

## 2025-02-20 DIAGNOSIS — I10 BENIGN ESSENTIAL HYPERTENSION: ICD-10-CM

## 2025-02-20 RX ORDER — HYDROCHLOROTHIAZIDE 12.5 MG/1
12.5 TABLET ORAL DAILY
Qty: 90 TABLET | Refills: 0 | Status: SHIPPED | OUTPATIENT
Start: 2025-02-20

## 2025-02-20 RX ORDER — LEVOTHYROXINE SODIUM 112 UG/1
112 TABLET ORAL DAILY
Qty: 90 TABLET | Refills: 0 | Status: SHIPPED | OUTPATIENT
Start: 2025-02-20

## 2025-03-03 DIAGNOSIS — F33.1 MAJOR DEPRESSIVE DISORDER, RECURRENT EPISODE, MODERATE (H): ICD-10-CM

## 2025-03-03 RX ORDER — CITALOPRAM HYDROBROMIDE 20 MG/1
20 TABLET ORAL DAILY
Qty: 30 TABLET | Refills: 0 | Status: SHIPPED | OUTPATIENT
Start: 2025-03-03

## 2025-03-17 ENCOUNTER — OFFICE VISIT (OUTPATIENT)
Dept: FAMILY MEDICINE | Facility: CLINIC | Age: 70
End: 2025-03-17
Payer: COMMERCIAL

## 2025-03-17 VITALS
SYSTOLIC BLOOD PRESSURE: 136 MMHG | RESPIRATION RATE: 8 BRPM | HEART RATE: 85 BPM | BODY MASS INDEX: 26.51 KG/M2 | TEMPERATURE: 97.8 F | OXYGEN SATURATION: 98 % | HEIGHT: 69 IN | WEIGHT: 179 LBS | DIASTOLIC BLOOD PRESSURE: 80 MMHG

## 2025-03-17 DIAGNOSIS — K22.70 BARRETT'S ESOPHAGUS WITHOUT DYSPLASIA: ICD-10-CM

## 2025-03-17 DIAGNOSIS — R07.89 ATYPICAL CHEST PAIN: ICD-10-CM

## 2025-03-17 DIAGNOSIS — F33.1 MAJOR DEPRESSIVE DISORDER, RECURRENT EPISODE, MODERATE (H): ICD-10-CM

## 2025-03-17 DIAGNOSIS — Z71.89 ACP (ADVANCE CARE PLANNING): ICD-10-CM

## 2025-03-17 DIAGNOSIS — Z12.5 SCREENING FOR PROSTATE CANCER: ICD-10-CM

## 2025-03-17 DIAGNOSIS — I10 BENIGN ESSENTIAL HYPERTENSION: ICD-10-CM

## 2025-03-17 DIAGNOSIS — R93.89 ABNORMAL CT OF THE CHEST: ICD-10-CM

## 2025-03-17 DIAGNOSIS — K21.00 GASTROESOPHAGEAL REFLUX DISEASE WITH ESOPHAGITIS WITHOUT HEMORRHAGE: ICD-10-CM

## 2025-03-17 DIAGNOSIS — E03.9 HYPOTHYROIDISM, UNSPECIFIED TYPE: ICD-10-CM

## 2025-03-17 DIAGNOSIS — Z00.00 ENCOUNTER FOR MEDICARE ANNUAL WELLNESS EXAM: Primary | ICD-10-CM

## 2025-03-17 LAB
BASOPHILS # BLD AUTO: 0 10E3/UL (ref 0–0.2)
BASOPHILS NFR BLD AUTO: 0 %
EOSINOPHIL # BLD AUTO: 0.3 10E3/UL (ref 0–0.7)
EOSINOPHIL NFR BLD AUTO: 4 %
ERYTHROCYTE [DISTWIDTH] IN BLOOD BY AUTOMATED COUNT: 13 % (ref 10–15)
HCT VFR BLD AUTO: 37.4 % (ref 40–53)
HGB BLD-MCNC: 12.1 G/DL (ref 13.3–17.7)
IMM GRANULOCYTES # BLD: 0 10E3/UL
IMM GRANULOCYTES NFR BLD: 0 %
LYMPHOCYTES # BLD AUTO: 2.6 10E3/UL (ref 0.8–5.3)
LYMPHOCYTES NFR BLD AUTO: 34 %
Lab: NORMAL
MCH RBC QN AUTO: 25.2 PG (ref 26.5–33)
MCHC RBC AUTO-ENTMCNC: 32.4 G/DL (ref 31.5–36.5)
MCV RBC AUTO: 78 FL (ref 78–100)
MONOCYTES # BLD AUTO: 0.7 10E3/UL (ref 0–1.3)
MONOCYTES NFR BLD AUTO: 10 %
NEUTROPHILS # BLD AUTO: 3.9 10E3/UL (ref 1.6–8.3)
NEUTROPHILS NFR BLD AUTO: 52 %
PERFORMING LABORATORY: NORMAL
PLATELET # BLD AUTO: 216 10E3/UL (ref 150–450)
RBC # BLD AUTO: 4.8 10E6/UL (ref 4.4–5.9)
SPECIMEN STATUS: NORMAL
TEST NAME: NORMAL
WBC # BLD AUTO: 7.6 10E3/UL (ref 4–11)

## 2025-03-17 RX ORDER — LEVOTHYROXINE SODIUM 112 UG/1
112 TABLET ORAL DAILY
Qty: 90 TABLET | Refills: 0 | Status: SHIPPED | OUTPATIENT
Start: 2025-03-17

## 2025-03-17 RX ORDER — CITALOPRAM HYDROBROMIDE 20 MG/1
20 TABLET ORAL DAILY
Qty: 90 TABLET | Refills: 1 | Status: SHIPPED | OUTPATIENT
Start: 2025-03-17

## 2025-03-17 RX ORDER — PANTOPRAZOLE SODIUM 40 MG/1
40 TABLET, DELAYED RELEASE ORAL DAILY
Qty: 90 TABLET | Refills: 3 | Status: SHIPPED | OUTPATIENT
Start: 2025-03-17

## 2025-03-17 RX ORDER — LOSARTAN POTASSIUM AND HYDROCHLOROTHIAZIDE 12.5; 5 MG/1; MG/1
1 TABLET ORAL DAILY
Qty: 90 TABLET | Refills: 0 | Status: SHIPPED | OUTPATIENT
Start: 2025-03-17

## 2025-03-17 SDOH — HEALTH STABILITY: PHYSICAL HEALTH: ON AVERAGE, HOW MANY DAYS PER WEEK DO YOU ENGAGE IN MODERATE TO STRENUOUS EXERCISE (LIKE A BRISK WALK)?: 4 DAYS

## 2025-03-17 SDOH — HEALTH STABILITY: PHYSICAL HEALTH: ON AVERAGE, HOW MANY MINUTES DO YOU ENGAGE IN EXERCISE AT THIS LEVEL?: 30 MIN

## 2025-03-17 ASSESSMENT — ANXIETY QUESTIONNAIRES
2. NOT BEING ABLE TO STOP OR CONTROL WORRYING: NOT AT ALL
1. FEELING NERVOUS, ANXIOUS, OR ON EDGE: SEVERAL DAYS
GAD7 TOTAL SCORE: 2
GAD7 TOTAL SCORE: 2
6. BECOMING EASILY ANNOYED OR IRRITABLE: SEVERAL DAYS
7. FEELING AFRAID AS IF SOMETHING AWFUL MIGHT HAPPEN: NOT AT ALL
7. FEELING AFRAID AS IF SOMETHING AWFUL MIGHT HAPPEN: NOT AT ALL
3. WORRYING TOO MUCH ABOUT DIFFERENT THINGS: NOT AT ALL
8. IF YOU CHECKED OFF ANY PROBLEMS, HOW DIFFICULT HAVE THESE MADE IT FOR YOU TO DO YOUR WORK, TAKE CARE OF THINGS AT HOME, OR GET ALONG WITH OTHER PEOPLE?: SOMEWHAT DIFFICULT
5. BEING SO RESTLESS THAT IT IS HARD TO SIT STILL: NOT AT ALL
4. TROUBLE RELAXING: NOT AT ALL
IF YOU CHECKED OFF ANY PROBLEMS ON THIS QUESTIONNAIRE, HOW DIFFICULT HAVE THESE PROBLEMS MADE IT FOR YOU TO DO YOUR WORK, TAKE CARE OF THINGS AT HOME, OR GET ALONG WITH OTHER PEOPLE: SOMEWHAT DIFFICULT
GAD7 TOTAL SCORE: 2

## 2025-03-17 ASSESSMENT — PAIN SCALES - GENERAL: PAINLEVEL_OUTOF10: NO PAIN (0)

## 2025-03-17 ASSESSMENT — PATIENT HEALTH QUESTIONNAIRE - PHQ9
10. IF YOU CHECKED OFF ANY PROBLEMS, HOW DIFFICULT HAVE THESE PROBLEMS MADE IT FOR YOU TO DO YOUR WORK, TAKE CARE OF THINGS AT HOME, OR GET ALONG WITH OTHER PEOPLE: SOMEWHAT DIFFICULT
SUM OF ALL RESPONSES TO PHQ QUESTIONS 1-9: 4
SUM OF ALL RESPONSES TO PHQ QUESTIONS 1-9: 4

## 2025-03-17 ASSESSMENT — SOCIAL DETERMINANTS OF HEALTH (SDOH): HOW OFTEN DO YOU GET TOGETHER WITH FRIENDS OR RELATIVES?: ONCE A WEEK

## 2025-03-17 NOTE — PATIENT INSTRUCTIONS
"Patient Education     Call your insurance about where to get the shingles vaccine if interested.     Please call [phone] to schedule your imaging study.     Regarding your Blood Pressure:  We care about controlling high blood pressure to decrease your risk of heart disease, stroke, and kidney disease.    We changed your medication list today. Please review this for further instructions and accuracy.    Your goal blood pressure is < 140/90.    Since we have made a change, I recommending you monitor your blood pressure twice daily (taking 2 readings each time, 1-2 minutes apart) with a home blood pressure cuff. You may need to purchase this if you do not already have one. Report these numbers to me by calling the clinic, or messaging on [employer] after 2 weeks.     If you are starting, or changing a medication: please schedule a follow up \"MA/Nursing\" visit to check your blood pressure and/or a \"Lab Only\" visit for follow-up monitoring labs in 2 weeks. You do not need to see me that day.    Once your blood pressure is under control, we will have you check your blood pressures once weekly (taking 2 readings each time, 1-2 minutes apart). Schedule a follow-up visit with me if your numbers are becoming consistently above our set blood pressure goal above.    If applicable, decrease your caffeine consumption.    Limit alcohol use.    Stay on a strict sleep schedule, going to bed at the same time, get up at the same time, and give yourself at least 8 hours.  If noticing snoring, daytime sleepiness, or episodes where you stop breathing in your sleep request a sleep study.    Work on weight loss/maintaining a healthy weight:  Follow the DASH diet. (https://www.nhlbi.nih.gov/education/dash-eating-plan)  Increase exercise to 30 minutes a day 5 days a week (150 minutes a week on average).    Some over the counter medications can increase blood pressures like:  Sudafed (pseudoephedrine) containing products (also called " "\"decongestants\")   NSAID medications like ibuprofen (Advil and Motrin) and naproxen (Aleve or Naprosyn)              Preventive Care Advice   This is general advice given by our system to help you stay healthy. However, your care team may have specific advice just for you. Please talk to your care team about your preventive care needs.  Nutrition  Eat 5 or more servings of fruits and vegetables each day.  Try wheat bread, brown rice and whole grain pasta (instead of white bread, rice, and pasta).  Get enough calcium and vitamin D. Check the label on foods and aim for 100% of the RDA (recommended daily allowance).  Lifestyle  Exercise at least 150 minutes each week  (30 minutes a day, 5 days a week).  Do muscle strengthening activities 2 days a week. These help control your weight and prevent disease.  No smoking.  Wear sunscreen to prevent skin cancer.  Have a dental exam and cleaning every 6 months.  Yearly exams  See your health care team every year to talk about:  Any changes in your health.  Any medicines your care team has prescribed.  Preventive care, family planning, and ways to prevent chronic diseases.  Shots (vaccines)   HPV shots (up to age 26), if you've never had them before.  Hepatitis B shots (up to age 59), if you've never had them before.  COVID-19 shot: Get this shot when it's due.  Flu shot: Get a flu shot every year.  Tetanus shot: Get a tetanus shot every 10 years.  Pneumococcal, hepatitis A, and RSV shots: Ask your care team if you need these based on your risk.  Shingles shot (for age 50 and up)  General health tests  Diabetes screening:  Starting at age 35, Get screened for diabetes at least every 3 years.  If you are younger than age 35, ask your care team if you should be screened for diabetes.  Cholesterol test: At age 39, start having a cholesterol test every 5 years, or more often if advised.  Bone density scan (DEXA): At age 50, ask your care team if you should have this scan for " osteoporosis (brittle bones).  Hepatitis C: Get tested at least once in your life.  STIs (sexually transmitted infections)  Before age 24: Ask your care team if you should be screened for STIs.  After age 24: Get screened for STIs if you're at risk. You are at risk for STIs (including HIV) if:  You are sexually active with more than one person.  You don't use condoms every time.  You or a partner was diagnosed with a sexually transmitted infection.  If you are at risk for HIV, ask about PrEP medicine to prevent HIV.  Get tested for HIV at least once in your life, whether you are at risk for HIV or not.  Cancer screening tests  Cervical cancer screening: If you have a cervix, begin getting regular cervical cancer screening tests starting at age 21.  Breast cancer scan (mammogram): If you've ever had breasts, begin having regular mammograms starting at age 40. This is a scan to check for breast cancer.  Colon cancer screening: It is important to start screening for colon cancer at age 45.  Have a colonoscopy test every 10 years (or more often if you're at risk) Or, ask your provider about stool tests like a FIT test every year or Cologuard test every 3 years.  To learn more about your testing options, visit:   .  For help making a decision, visit:   https://bit.ly/nf80211.  Prostate cancer screening test: If you have a prostate, ask your care team if a prostate cancer screening test (PSA) at age 55 is right for you.  Lung cancer screening: If you are a current or former smoker ages 50 to 80, ask your care team if ongoing lung cancer screenings are right for you.  For informational purposes only. Not to replace the advice of your health care provider. Copyright   2023 [employer]. All rights reserved. Clinically reviewed by the [facility]. [employer] 815004 - REV 01/24.  Hearing Loss: Care Instructions  Overview     Hearing loss is a sudden or slow decrease in how well you hear.  It can range from slight to profound. Permanent hearing loss can occur with aging. It also can happen when you are exposed long-term to loud noise. Examples include listening to loud music, riding motorcycles, or being around other loud machines.  Hearing loss can affect your work and home life. It can make you feel lonely or depressed. You may feel that you have lost your independence. But hearing aids and other devices can help you hear better and feel connected to others.  Follow-up care is a key part of your treatment and safety. Be sure to make and go to all appointments, and call your doctor if you are having problems. It's also a good idea to know your test results and keep a list of the medicines you take.  How can you care for yourself at home?  Avoid loud noises whenever possible. This helps keep your hearing from getting worse.  Always wear hearing protection around loud noises.  Wear a hearing aid as directed.  A professional can help you pick a hearing aid that will work best for you.  You can also get hearing aids over the counter for mild to moderate hearing loss.  Have hearing tests as your doctor suggests. They can show whether your hearing has changed. Your hearing aid may need to be adjusted.  Use other devices as needed. These may include:  Telephone amplifiers and hearing aids that can connect to a television, stereo, radio, or microphone.  Devices that use lights or vibrations. These alert you to the doorbell, a ringing telephone, or a baby monitor.  Television closed-captioning. This shows the words at the bottom of the screen. Most new TVs can do this.  TTY (text telephone). This lets you type messages back and forth on the telephone instead of talking or listening. These devices are also called TDD. When messages are typed on the keyboard, they are sent over the phone line to a receiving TTY. The message is shown on a monitor.  Use text messaging, social media, and email if it is hard for  "you to communicate by telephone.  Try to learn a listening technique called speechreading. It is not lipreading. You pay attention to people's gestures, expressions, posture, and tone of voice. These clues can help you understand what a person is saying. Face the person you are talking to, and have them face you. Make sure the lighting is good. You need to see the other person's face clearly.  Think about counseling if you need help to adjust to your hearing loss.  When should you call for help?  Watch closely for changes in your health, and be sure to contact your doctor if:    You think your hearing is getting worse.     You have new symptoms, such as dizziness or nausea.   Where can you learn more?  Go to https://www.[employer].net/patiented  Enter R798 in the search box to learn more about \"Hearing Loss: Care Instructions.\"  Current as of: October 27, 2024  Content Version: 14.4    4721-9683 [employer].   Care instructions adapted under license by your healthcare professional. If you have questions about a medical condition or this instruction, always ask your healthcare professional. [employer] disclaims any warranty or liability for your use of this information.       "

## 2025-03-17 NOTE — PROGRESS NOTES
Preventive Care Visit  [facility] AZIZA Hernández MD, Family Medicine  Mar 17, 2025    Assessment & Plan   1. Encounter for Medicare annual wellness exam (Primary)  Discussed personal health and safety. Routine screenings ordered as below. Appropriate anticipatory guidance, vaccinations, and health screening recommendations delivered according to the USPSTF and other appropriate society guidelines. Martin reports understanding and in agreement with this mutually agreed upon plan.    Today's Orders:  - REVIEW OF HEALTH MAINTENANCE PROTOCOL ORDERS  - TSH WITH FREE T4 REFLEX; Future  - Lipid panel reflex to direct LDL Non-fasting; Future  - Comprehensive metabolic panel; Future  - CBC with Platelets & Differential; Future  - Prostate Specific Antigen Screen; Future    2. ACP (advance care planning)  Discussed importance of ACP as part of standard end of life care planning / Preventative health. Discussed it can improve his care and make decisions/grief easier for family should it ever need to be implemented. Discussion lasted < 30 min.    3. Abnormal CT of the chest  Follow-up CT ordered.  - CT Chest w/o Contrast; Future    4. Benign essential hypertension  Controlled on repeat, but high outside of clinic. Add losartan. Recheck in 2 weeks.  - losartan-hydrochlorothiazide (HYZAAR) 50-12.5 MG tablet; Take 1 tablet by mouth daily.  Dispense: 90 tablet; Refill: 0  - Basic metabolic panel; Future  - PRIMARY CARE FOLLOW-UP SCHEDULING; Future  - PRIMARY CARE FOLLOW-UP SCHEDULING; Future    5. Atypical chest pain  Work up further as below.  - EKG 12-lead complete w/read - Clinics  - Echocardiogram Dobutamine Stress; Future    6. Hypothyroidism, unspecified type  Symptoms stable/controlled. Medication monitoring labs ordered/reviewed. Tolerating pharmacotherapy well. Continue current regiment. Medication refilled per orders as below.   - TSH WITH FREE T4 REFLEX; Future  - levothyroxine  (SYNTHROID/LEVOTHROID) 112 MCG tablet; Take 1 tablet (112 mcg) by mouth daily.  Dispense: 90 tablet; Refill: 0    7. Raymond's esophagus without dysplasia  Follow-up with GI for EGD in 2 years.  - pantoprazole (PROTONIX) 40 MG EC tablet; Take 1 tablet (40 mg) by mouth daily.  Dispense: 90 tablet; Refill: 3    8. Gastroesophageal reflux disease with esophagitis without hemorrhage  - pantoprazole (PROTONIX) 40 MG EC tablet; Take 1 tablet (40 mg) by mouth daily.  Dispense: 90 tablet; Refill: 3    9. Major depressive disorder, recurrent episode, moderate (H)  Chronic stable condition.  PHQ-9 filled out today.  Medication refills given.  No SI, or significant side effects.  Follow-up in 6 months. Sooner if worsening of symptoms.  Patient will alert our office if they have any medication changes, or they start taking supplements not prescribed by our office.        11/3/2023     8:44 AM 3/6/2024     3:14 PM 3/17/2025     8:46 AM   PHQ   PHQ-9 Total Score 5 3 4    Q9: Thoughts of better off dead/self-harm past 2 weeks Not at all Not at all Not at all       Patient-reported      Today's Orders:  - citalopram (CELEXA) 20 MG tablet; Take 1 tablet (20 mg) by mouth daily.  Dispense: 90 tablet; Refill: 1    10. Screening for prostate cancer  Patient elects to undergo PSA screening after informed decision making process. This discussion with the patient included reviewing the benefits of testing such as: Ability to easily add on to existing blood work, screening for a common cancer in men which has treatment options and otherwise may have been silent, and possibility for early detection to prevent morbidity from future metastasis and/or death. Additionally, risks were discussed including possibility of false positive testing (leading to anxiety and further unnecessary testing/biopsies), possibility of over treating a cancer that may not affect a man in his lifetime, and the side effects of the current treatment options for  prosate cancer (urinary incontinence, ED, etc).    Counseling  Appropriate preventive services were addressed with this patient via screening, questionnaire, or discussion as appropriate for fall prevention, nutrition, physical activity, Tobacco-use cessation, social engagement, weight loss and cognition.  Checklist reviewing preventive services available has been given to the patient.  Reviewed patient's diet, addressing concerns and/or questions.   The patient was instructed to see the dentist every 6 months.   The patient was provided with written information regarding signs of hearing loss.      Follow-up Visit   Expected date:  Mar 31, 2025 (Approximate)      Follow Up Appointment Details:     Follow-up with whom?: Other Primary Care Services    Follow-Up for what?: Clinic Staff Visit (MA, LPN, VF)    How?: In Person    Is this an as-needed follow-up?: No             Follow-up Visit   Expected date:  Mar 31, 2025 (Approximate)      Follow Up Appointment Details:     Follow-up with whom?: Other Primary Care Services    Follow-Up for what?: Lab Visit    How?: In Person    Is this an as-needed follow-up?: No             Follow-up Visit   Expected date:  Mar 24, 2026 (Approximate)      Follow Up Appointment Details:     Follow-up with whom?: PCP    Follow-Up for what?: Medicare Wellness    Welcome or Annual?: Annual Wellness    How?: In Person               Rajinder Hernández MD  [facility]    Disclaimer: This note consists of symbols derived from keyboarding, dictation and/or voice recognition software. As a result, there may be errors in the script that have gone undetected. Please consider this when interpreting information found in this chart.    The longitudinal plan of care for the diagnosis(es)/condition(s) as documented were addressed during this visit. Due to the added complexity in care, I will continue to support Martin in the subsequent management and with ongoing  continuity of care.    Subjective   Martin is a 69 year old, presenting for the following:  Physical        3/17/2025     8:54 AM   Additional Questions   Roomed by Millie   Accompanied by Self     HPI  Had EGD in July 2024, recommended 3-5 year follow-up. On protonix.    Hx of abnormal lingular consolidation, was supposed to repeat CT scan last fall. Not done yet.     High blood pressures at his suboxone clinic.    Did exercise stress test in the past, couldn't do the bike. Needs dobutamine stress test.    2 episodes of random (while sitting) sharp non-radiating left chest pains. Wondering if this is his heart or from the previous CT consolidation.    Advance Care Planning  Patient does not have a Health Care Directive: Discussed advance care planning with patient; information given to patient to review.      3/17/2025   General Health   How would you rate your overall physical health? Good   Feel stress (tense, anxious, or unable to sleep) Only a little   (!) STRESS CONCERN      3/17/2025   Nutrition   Diet: Regular (no restrictions)         3/17/2025   Exercise   Days per week of moderate/strenous exercise 4 days   Average minutes spent exercising at this level 30 min         3/17/2025   Social Factors   Frequency of gathering with friends or relatives Once a week   Worry food won't last until get money to buy more No   Food not last or not have enough money for food? No   Do you have housing? (Housing is defined as stable permanent housing and does not include staying ouside in a car, in a tent, in an abandoned building, in an overnight shelter, or couch-surfing.) Yes   Are you worried about losing your housing? No   Lack of transportation? No   Unable to get utilities (heat,electricity)? No         3/17/2025   Fall Risk   Fallen 2 or more times in the past year? No   Trouble with walking or balance? No          3/17/2025   Activities of Daily Living- Home Safety   Needs help with the following daily activites  None of the above   Safety concerns in the home None of the above         3/17/2025   Dental   Dentist two times every year? (!) NO         3/17/2025   Hearing Screening   Hearing concerns? (!) I NEED TO ASK PEOPLE TO SPEAK UP OR REPEAT THEMSELVES.         3/17/2025   Driving Risk Screening   Patient/family members have concerns about driving No         3/17/2025   General Alertness/Fatigue Screening   Have you been more tired than usual lately? No         3/17/2025   Urinary Incontinence Screening   Bothered by leaking urine in past 6 months No         Today's PHQ-9 Score:       3/17/2025     8:46 AM   PHQ-9 SCORE   PHQ-9 Total Score MyChart 4 (Minimal depression)   PHQ-9 Total Score 4        Patient-reported         3/17/2025   Substance Use   Alcohol more than 3/day or more than 7/wk Not Applicable   Do you have a current opioid prescription? No   How severe/bad is pain from 1 to 10? 3/10   Do you use any other substances recreationally? No     Social History     Tobacco Use    Smoking status: Never     Passive exposure: Never    Smokeless tobacco: Never   Vaping Use    Vaping status: Never Used   Substance Use Topics    Alcohol use: No    Drug use: No     Comment: h/o chem dep due to post -op analgesics 1-06           3/17/2025   AAA Screening   Family history of Abdominal Aortic Aneurysm (AAA)? Unsure   Last PSA:   PSA   Date Value Ref Range Status   02/11/2021 0.22 0 - 4 ug/L Final     Comment:     Assay Method:  Chemiluminescence using Siemens Vista analyzer     Prostate Specific Antigen Screen   Date Value Ref Range Status   03/06/2024 0.12 0.00 - 4.50 ng/mL Final   01/11/2023 0.28 0.00 - 4.00 ug/L Final     ASCVD Risk   The 10-year ASCVD risk score (Esme POLANCO, et al., 2019) is: 21.5%    Values used to calculate the score:      Age: 69 years      Sex: Male      Is Non- : No      Diabetic: No      Tobacco smoker: No      Systolic Blood Pressure: 150 mmHg      Is BP treated:  Yes      HDL Cholesterol: 42 mg/dL      Total Cholesterol: 146 mg/dL    Reviewed and updated as needed this visit by Provider   Tobacco  Allergies  Meds  Problems  Med Hx  Surg Hx  Fam Hx        Social Hx Reviewed    Past Medical History:   Diagnosis Date    Chemical dependency (H)     remission- 2008, suboxone    Diverticulosis     DJD (degenerative joint disease) of knee     Thyroid disease      Past Surgical History:   Procedure Laterality Date    ARTHROPLASTY KNEE UNICOMPARTMENT  11/8/2011    Procedure:ARTHROPLASTY KNEE UNICOMPARTMENT; LEFT KNEE UNICOMPARTMENT ARTHROPLASTY (FREEMAN)^; Surgeon:SHABBIR RADER; Location:[facility] OR    ARTHROSCOPY KNEE RT/LT  1/2006    left    COLONOSCOPY  3/27/08    due in 2013    COLONOSCOPY WITH CO2 INSUFFLATION N/A 11/5/2018    Procedure: screening colonoscopy;  Surgeon: Adrian Hawthorne MD;  Location: [facility] OR    ESOPHAGOSCOPY, GASTROSCOPY, DUODENOSCOPY (EGD), COMBINED N/A 5/3/2024    Procedure: ESOPHAGOGASTRODUODENOSCOPY, WITH BIOPSY;  Surgeon: Tano Callaway MD;  Location: [facility]    ESOPHAGOSCOPY, GASTROSCOPY, DUODENOSCOPY (EGD), COMBINED N/A 7/31/2024    Procedure: ESOPHAGOGASTRODUODENOSCOPY, WITH BIOPSY;  Surgeon: Nomi Flores MD;  Location: [facility]     Current providers sharing in care for this patient include:  Patient Care Team:  Rajinder Hernández MD as PCP - General (Family Medicine)  Rajinder Hernández MD as Assigned PCP  Dimitris Dickey PsyD as Assigned Behavioral Health Provider    The following health maintenance items are reviewed in Epic and correct as of today:  Health Maintenance   Topic Date Due    URINE DRUG SCREEN  Never done    CONTROLLED SUBSTANCE AGREEMENT FOR CHRONIC PAIN MANAGEMENT  Never done    HEPATITIS A IMMUNIZATION (1 of 2 - Risk 2-dose series) Never done    ZOSTER IMMUNIZATION (2 of 2) 04/08/2021    BMP  03/06/2025    ANNUAL REVIEW OF HM ORDERS  03/06/2025    TSH W/FREE T4 REFLEX  03/06/2025    COVID-19 Vaccine (9 - 2024-25  "season) 04/02/2025    MEDICARE ANNUAL WELLNESS VISIT  03/17/2026    MINA ASSESSMENT  03/17/2026    FALL RISK ASSESSMENT  03/17/2026    PHQ-9  03/17/2026    GLUCOSE  03/06/2027    COLORECTAL CANCER SCREENING  11/05/2028    LIPID  03/06/2029    ADVANCE CARE PLANNING  03/17/2030    DTAP/TDAP/TD IMMUNIZATION (3 - Td or Tdap) 02/11/2031    RSV VACCINE (1 - 1-dose 75+ series) 11/19/2031    HEPATITIS C SCREENING  Completed    DEPRESSION ACTION PLAN  Completed    INFLUENZA VACCINE  Completed    Pneumococcal Vaccine: 50+ Years  Completed    HPV IMMUNIZATION  Aged Out    MENINGITIS IMMUNIZATION  Aged Out         Review of Systems  Constitutional, HEENT, cardiovascular, pulmonary, GI, , musculoskeletal, neuro, skin, endocrine and psych systems are negative, except as otherwise noted.     Objective    Exam  BP (!) 150/82   Pulse 85   Temp 97.8  F (36.6  C) (Temporal)   Resp (!) 8   Ht 1.748 m (5' 8.82\")   Wt 81.2 kg (179 lb)   SpO2 98%   BMI 26.57 kg/m     Estimated body mass index is 26.57 kg/m  as calculated from the following:    Height as of this encounter: 1.748 m (5' 8.82\").    Weight as of this encounter: 81.2 kg (179 lb).    Physical Exam  HENT:      Head: Normocephalic and atraumatic.      Right Ear: Tympanic membrane, ear canal and external ear normal. There is no impacted cerumen.      Left Ear: Tympanic membrane, ear canal and external ear normal. There is no impacted cerumen.      Nose: Nose normal. No congestion.      Mouth/Throat:      Pharynx: Oropharynx is clear. No oropharyngeal exudate or posterior oropharyngeal erythema.   Eyes:      General:         Right eye: No discharge.         Left eye: No discharge.      Extraocular Movements: Extraocular movements intact.      Conjunctiva/sclera: Conjunctivae normal.      Pupils: Pupils are equal, round, and reactive to light.   Cardiovascular:      Rate and Rhythm: Normal rate and regular rhythm.      Heart sounds: Normal heart sounds. No murmur heard.     " No friction rub.   Pulmonary:      Effort: Pulmonary effort is normal. No respiratory distress.      Breath sounds: Normal breath sounds. No wheezing or rhonchi.   Abdominal:      General: Abdomen is flat. There is no distension.      Palpations: Abdomen is soft. There is no mass.      Tenderness: There is no abdominal tenderness. There is no guarding.   Musculoskeletal:         General: No swelling.      Cervical back: Normal range of motion and neck supple. No tenderness.      Right lower leg: No edema.      Left lower leg: No edema.   Lymphadenopathy:      Cervical: No cervical adenopathy.   Skin:     General: Skin is warm.      Findings: No rash.   Neurological:      General: No focal deficit present.      Mental Status: He is alert.      Cranial Nerves: No cranial nerve deficit.      Motor: No weakness.      Coordination: Coordination normal.      Gait: Gait normal.   Psychiatric:         Mood and Affect: Mood normal.         Behavior: Behavior normal.         Thought Content: Thought content normal.         Judgment: Judgment normal.     Labs: Pending    EKG: Normal sinus rhythm rate 87. Normal axis, normal progression of precordial leads. No ST segment, or T wave changes concerning for acute ischemia.           3/17/2025   Mini Cog   Clock Draw Score 2 Normal   3 Item Recall 2 objects recalled   Mini Cog Total Score 4          Signed Electronically by: Rajinder Hernández MD    Answers submitted by the patient for this visit:  Patient Health Questionnaire (Submitted on 3/17/2025)  If you checked off any problems, how difficult have these problems made it for you to do your work, take care of things at home, or get along with other people?: Somewhat difficult  PHQ9 TOTAL SCORE: 4  Patient Health Questionnaire (G7) (Submitted on 3/17/2025)  MINA 7 TOTAL SCORE: 2

## 2025-03-17 NOTE — RESULT ENCOUNTER NOTE
Ezekiel Salazar,    If you have not viewed these results on [employer] within 3 days, we will use an alternative method to contact you. We will contact you via the following protocol:    - Via letter if your results are normal.  - Via phone ([phone]) if your results are abnormal.     Here are my comments about your recent results:    CBC Results - Your cell counts were normal/stable for you.    Please call the clinic ([phone]), or message us on [employer] with any questions you may have.     Have a great day,    Dr. Merchant

## 2025-03-18 LAB
ALBUMIN SERPL BCG-MCNC: 4.2 G/DL (ref 3.5–5.2)
ALP SERPL-CCNC: 80 U/L (ref 40–150)
ALT SERPL W P-5'-P-CCNC: 21 U/L (ref 0–70)
ANION GAP SERPL CALCULATED.3IONS-SCNC: 15 MMOL/L (ref 7–15)
AST SERPL W P-5'-P-CCNC: 24 U/L (ref 0–45)
BILIRUB SERPL-MCNC: 0.2 MG/DL
BUN SERPL-MCNC: 20 MG/DL (ref 8–23)
CALCIUM SERPL-MCNC: 9.7 MG/DL (ref 8.8–10.4)
CHLORIDE SERPL-SCNC: 101 MMOL/L (ref 98–107)
CHOLEST SERPL-MCNC: 146 MG/DL
CREAT SERPL-MCNC: 1.14 MG/DL (ref 0.67–1.17)
EGFRCR SERPLBLD CKD-EPI 2021: 70 ML/MIN/1.73M2
FASTING STATUS PATIENT QL REPORTED: NO
FASTING STATUS PATIENT QL REPORTED: NO
GLUCOSE SERPL-MCNC: 102 MG/DL (ref 70–99)
HCO3 SERPL-SCNC: 23 MMOL/L (ref 22–29)
HDLC SERPL-MCNC: 43 MG/DL
LDLC SERPL CALC-MCNC: 81 MG/DL
MISCELLANEOUS TEST 1 (ARUP): NORMAL
NONHDLC SERPL-MCNC: 103 MG/DL
POTASSIUM SERPL-SCNC: 3.7 MMOL/L (ref 3.4–5.3)
PROT SERPL-MCNC: 7.5 G/DL (ref 6.4–8.3)
PSA SERPL DL<=0.01 NG/ML-MCNC: 0.12 NG/ML (ref 0–4.5)
SODIUM SERPL-SCNC: 139 MMOL/L (ref 135–145)
TRIGL SERPL-MCNC: 108 MG/DL
TSH SERPL DL<=0.005 MIU/L-ACNC: 2.21 UIU/ML (ref 0.3–4.2)

## 2025-03-19 LAB
EST. AVERAGE GLUCOSE BLD GHB EST-MCNC: 128 MG/DL
HBA1C MFR BLD: 6.1 % (ref 0–5.6)

## 2025-03-19 NOTE — RESULT ENCOUNTER NOTE
Ezekiel Salazar,    If you have not viewed these results on [employer] within 3 days, we will use an alternative method to contact you. We will contact you via the following protocol:    - Via letter if your results are normal.  - Via phone ([phone]) if your results are abnormal.     Here are my comments about your recent results:    A1c - Your 3 month blood sugar average was slightly high, between 5.7 and 6.4. This range is indicative of pre-diabetes. Individuals with pre-diabetes have a 10% per year of progressing to diabetes.     We should recheck this every year to monitor for progression to diabetes. Losing weight, a healthy diet, and exercise can help reduce your risk. If you would like to meet with a dietician, or diabetes educator to help you learn more let me know and I will place a referral for you.      Please call the clinic ([phone]), or message us on [employer] with any questions you may have.     Have a great day,    Dr. Merchant

## 2025-03-19 NOTE — RESULT ENCOUNTER NOTE
Ezekiel Salazar,    If you have not viewed these results on [employer] within 3 days, we will use an alternative method to contact you. We will contact you via the following protocol:    - Via letter if your results are normal.  - Via phone ([phone]) if your results are abnormal.     Here are my comments about your recent results:    CMP Results - Your blood sugar was 102, a follow-up test is pending. Your kidney function, electrolytes, and liver function were normal.    PSA - Your Prostate cancer screening lab results were normal.    TSH - Your thyroid lab results were normal.     Lipids - Your cholesterol lab results were normal.    Please call the clinic ([phone]), or message us on [employer] with any questions you may have.     Have a great day,    Dr. Merchant

## 2025-03-26 ENCOUNTER — TELEPHONE (OUTPATIENT)
Dept: FAMILY MEDICINE | Facility: CLINIC | Age: 70
End: 2025-03-26
Payer: COMMERCIAL

## 2025-03-26 NOTE — TELEPHONE ENCOUNTER
RN Triage    Patient Contact    Attempt # 1    Was call answered?  No.  Left message on voicemail with information to call me back.    Upon call back relay result message below.    Dayna Yancey RN  [facility] - Registered Nurse  Clinic Triage [facility]   March 26, 2025

## 2025-03-26 NOTE — LETTER
April 1, 2025      [place]  [address] [address] [address]        Dear ,    Ezekiel Salazar,    Here are my comments about your recent results:     A1c - Your 3 month blood sugar average was slightly high, between 5.7 and 6.4. This range is indicative of pre-diabetes. Individuals with pre-diabetes have a 10% per year of progressing to diabetes.     We should recheck this every year to monitor for progression to diabetes. Losing weight, a healthy diet, and exercise can help reduce your risk. If you would like to meet with a dietician, or diabetes educator to help you learn more let me know and I will place a referral for you.        Please call the clinic ([phone]), or message us on [employer] with any questions you may have.     Have a great day,     Dr. Merchant       If you have any questions or concerns, please call the clinic at the number listed above.       Sincerely,          Electronically signed

## 2025-03-26 NOTE — TELEPHONE ENCOUNTER
Ezekiel Salazar,     If you have not viewed these results on [employer] within 3 days, we will use an alternative method to contact you. We will contact you via the following protocol:    - Via letter if your results are normal.  - Via phone ([phone]) if your results are abnormal.     Here are my comments about your recent results:     A1c - Your 3 month blood sugar average was slightly high, between 5.7 and 6.4. This range is indicative of pre-diabetes. Individuals with pre-diabetes have a 10% per year of progressing to diabetes.     We should recheck this every year to monitor for progression to diabetes. Losing weight, a healthy diet, and exercise can help reduce your risk. If you would like to meet with a dietician, or diabetes educator to help you learn more let me know and I will place a referral for you.        Please call the clinic ([phone]), or message us on [employer] with any questions you may have.     Have a great day,     Dr. Merchant

## 2025-03-28 NOTE — TELEPHONE ENCOUNTER
Patient Contact    Attempt # 2    RN did attempt to reach patient, no answer, left voicemail for him to call us back for results.     Katiuska Stover RN on 3/28/2025 at 1:21 PM

## 2025-03-29 ENCOUNTER — HEALTH MAINTENANCE LETTER (OUTPATIENT)
Age: 70
End: 2025-03-29

## 2025-03-31 ENCOUNTER — LAB (OUTPATIENT)
Dept: LAB | Facility: CLINIC | Age: 70
End: 2025-03-31
Payer: COMMERCIAL

## 2025-03-31 ENCOUNTER — HOSPITAL ENCOUNTER (OUTPATIENT)
Dept: CT IMAGING | Facility: CLINIC | Age: 70
Discharge: HOME | End: 2025-03-31
Attending: FAMILY MEDICINE | Admitting: FAMILY MEDICINE
Payer: COMMERCIAL

## 2025-03-31 DIAGNOSIS — R93.89 ABNORMAL CT OF THE CHEST: ICD-10-CM

## 2025-03-31 DIAGNOSIS — I10 BENIGN ESSENTIAL HYPERTENSION: ICD-10-CM

## 2025-03-31 LAB
ANION GAP SERPL CALCULATED.3IONS-SCNC: 10 MMOL/L (ref 7–15)
BUN SERPL-MCNC: 18.5 MG/DL (ref 8–23)
CALCIUM SERPL-MCNC: 9.6 MG/DL (ref 8.8–10.4)
CHLORIDE SERPL-SCNC: 103 MMOL/L (ref 98–107)
CREAT SERPL-MCNC: 1.07 MG/DL (ref 0.67–1.17)
EGFRCR SERPLBLD CKD-EPI 2021: 76 ML/MIN/1.73M2
GLUCOSE SERPL-MCNC: 138 MG/DL (ref 70–99)
HCO3 SERPL-SCNC: 27 MMOL/L (ref 22–29)
POTASSIUM SERPL-SCNC: 3.5 MMOL/L (ref 3.4–5.3)
SODIUM SERPL-SCNC: 140 MMOL/L (ref 135–145)

## 2025-03-31 PROCEDURE — 71250 CT THORAX DX C-: CPT

## 2025-03-31 PROCEDURE — 36415 COLL VENOUS BLD VENIPUNCTURE: CPT

## 2025-03-31 PROCEDURE — 80048 BASIC METABOLIC PNL TOTAL CA: CPT

## 2025-03-31 NOTE — RESULT ENCOUNTER NOTE
Ezekiel Salazar,    If you have not viewed these results on [email or web address] within 3 days, we will use an alternative method to contact you. We will contact you via the following protocol:    - Via letter if your results are normal.  - Via phone ([phone]) if your results are abnormal.     Here are my comments about your recent results:    BMP - Your blood sugar was elevated. Your kidney function and electrolytes were normal.    In clinic blood pressure was acceptable. What are you getting for your home blood pressure readings with adding the losartan?    Please call the clinic ([phone]), or message us on [employer] with any questions you may have.     Have a great day,    Dr. Merchant no known precautions/limitations

## 2025-04-01 ENCOUNTER — PATIENT OUTREACH (OUTPATIENT)
Dept: CARE COORDINATION | Facility: CLINIC | Age: 70
End: 2025-04-01
Payer: COMMERCIAL

## 2025-04-01 NOTE — TELEPHONE ENCOUNTER
Please send letter    Dayna Yancey RN  [facility] - Registered Nurse  Clinic Triage [place]   April 1, 2025

## 2025-04-02 ENCOUNTER — TELEPHONE (OUTPATIENT)
Dept: FAMILY MEDICINE | Facility: CLINIC | Age: 70
End: 2025-04-02
Payer: COMMERCIAL

## 2025-04-02 NOTE — RESULT ENCOUNTER NOTE
Ezekiel Salazar,    If you have not viewed these results on [employer] within 3 days, we will use an alternative method to contact you. We will contact you via the following protocol:    - Via letter if your results are normal.  - Via phone ([phone]) if your results are abnormal.     Here are my comments about your recent results:    CT scan showed fairly stable lung findings. A small area of the lung is scarred on the left side. There is a very small nodule, likely benign or a small area of collapsed lung. I would recommend rechecking the CT scan in 1 year to monitor this spot.    Please call the clinic ([phone]), or message us on [employer] with any questions you may have.     Have a great day,    Dr. Merhcant

## 2025-04-02 NOTE — TELEPHONE ENCOUNTER
Patient is requesting call back regarding his chest CT results.   He is thinking may need cardiology referral because of findings.  Please advise on results.  Thank you  Francoise DORAN RN

## 2025-04-02 NOTE — TELEPHONE ENCOUNTER
Called back patient. Left voicemail. Recommend 10 mg of lipitor, consideration of stress test vs seeing cardiology for further work up (apo B, lipoprotein(a), etc). Await return call.     Rajinder Hernández MD  [facility]

## 2025-05-01 ENCOUNTER — ANCILLARY PROCEDURE (OUTPATIENT)
Dept: CARDIOLOGY | Facility: CLINIC | Age: 70
End: 2025-05-01
Attending: FAMILY MEDICINE
Payer: COMMERCIAL

## 2025-05-01 DIAGNOSIS — R07.89 ATYPICAL CHEST PAIN: ICD-10-CM

## 2025-05-01 PROCEDURE — 93016 CV STRESS TEST SUPVJ ONLY: CPT | Performed by: INTERNAL MEDICINE

## 2025-05-01 PROCEDURE — 93018 CV STRESS TEST I&R ONLY: CPT | Performed by: INTERNAL MEDICINE

## 2025-05-01 PROCEDURE — 93321 DOPPLER ECHO F-UP/LMTD STD: CPT | Performed by: INTERNAL MEDICINE

## 2025-05-01 PROCEDURE — 93325 DOPPLER ECHO COLOR FLOW MAPG: CPT | Performed by: INTERNAL MEDICINE

## 2025-05-01 PROCEDURE — 93350 STRESS TTE ONLY: CPT | Performed by: INTERNAL MEDICINE

## 2025-05-01 PROCEDURE — 93017 CV STRESS TEST TRACING ONLY: CPT | Performed by: INTERNAL MEDICINE

## 2025-05-01 RX ORDER — ATROPINE SULFATE 0.4 MG/ML
0.4 AMPUL (ML) INJECTION ONCE
Status: COMPLETED | OUTPATIENT
Start: 2025-05-01 | End: 2025-05-01

## 2025-05-01 RX ORDER — DOBUTAMINE HYDROCHLORIDE 200 MG/100ML
2.5-2 INJECTION INTRAVENOUS CONTINUOUS
Status: ACTIVE | OUTPATIENT
Start: 2025-05-01

## 2025-05-01 RX ORDER — METOPROLOL TARTRATE 1 MG/ML
5 INJECTION, SOLUTION INTRAVENOUS EVERY 5 MIN PRN
Status: ACTIVE | OUTPATIENT
Start: 2025-05-01

## 2025-05-01 RX ADMIN — DOBUTAMINE HYDROCHLORIDE 10 MCG/KG/MIN: 200 INJECTION INTRAVENOUS at 15:45

## 2025-05-01 RX ADMIN — Medication 7 ML: at 16:03

## 2025-05-01 RX ADMIN — Medication 0.4 MG: at 16:04

## 2025-05-01 RX ADMIN — METOPROLOL TARTRATE 2 MG: 1 INJECTION, SOLUTION INTRAVENOUS at 16:02

## 2025-05-02 NOTE — RESULT ENCOUNTER NOTE
Ezekiel Salazar,    If you have not viewed these results on [employer] within 3 days, we will use an alternative method to contact you. We will contact you via the following protocol:    - Via letter if your results are normal.  - Via phone ([phone]) if your results are abnormal.     Here are my comments about your recent results:    Your stress test was normal.    Please call the clinic ([phone]), or message us on [employer] with any questions you may have.     Have a great day,    Dr. Merchant

## 2025-05-28 ENCOUNTER — OFFICE VISIT (OUTPATIENT)
Dept: FAMILY MEDICINE | Facility: CLINIC | Age: 70
End: 2025-05-28
Payer: COMMERCIAL

## 2025-05-28 VITALS
BODY MASS INDEX: 27.05 KG/M2 | RESPIRATION RATE: 12 BRPM | OXYGEN SATURATION: 96 % | TEMPERATURE: 97.8 F | DIASTOLIC BLOOD PRESSURE: 72 MMHG | HEART RATE: 76 BPM | HEIGHT: 68 IN | SYSTOLIC BLOOD PRESSURE: 130 MMHG | WEIGHT: 178.5 LBS

## 2025-05-28 DIAGNOSIS — I10 BENIGN ESSENTIAL HYPERTENSION: ICD-10-CM

## 2025-05-28 DIAGNOSIS — R07.89 ATYPICAL CHEST PAIN: ICD-10-CM

## 2025-05-28 DIAGNOSIS — E03.9 HYPOTHYROIDISM, UNSPECIFIED TYPE: Primary | ICD-10-CM

## 2025-05-28 PROCEDURE — 99214 OFFICE O/P EST MOD 30 MIN: CPT | Performed by: FAMILY MEDICINE

## 2025-05-28 PROCEDURE — 3075F SYST BP GE 130 - 139MM HG: CPT | Performed by: FAMILY MEDICINE

## 2025-05-28 PROCEDURE — 82172 ASSAY OF APOLIPOPROTEIN: CPT | Mod: 90 | Performed by: FAMILY MEDICINE

## 2025-05-28 PROCEDURE — 36415 COLL VENOUS BLD VENIPUNCTURE: CPT | Performed by: FAMILY MEDICINE

## 2025-05-28 PROCEDURE — 3078F DIAST BP <80 MM HG: CPT | Performed by: FAMILY MEDICINE

## 2025-05-28 PROCEDURE — 99000 SPECIMEN HANDLING OFFICE-LAB: CPT | Performed by: FAMILY MEDICINE

## 2025-05-28 PROCEDURE — 1126F AMNT PAIN NOTED NONE PRSNT: CPT | Performed by: FAMILY MEDICINE

## 2025-05-28 PROCEDURE — G2211 COMPLEX E/M VISIT ADD ON: HCPCS | Performed by: FAMILY MEDICINE

## 2025-05-28 RX ORDER — LEVOTHYROXINE SODIUM 112 UG/1
112 TABLET ORAL DAILY
Qty: 90 TABLET | Refills: 2 | Status: SHIPPED | OUTPATIENT
Start: 2025-05-28

## 2025-05-28 RX ORDER — ATORVASTATIN CALCIUM 20 MG/1
20 TABLET, FILM COATED ORAL DAILY
Qty: 90 TABLET | Refills: 3 | Status: SHIPPED | OUTPATIENT
Start: 2025-05-28

## 2025-05-28 RX ORDER — LOSARTAN POTASSIUM AND HYDROCHLOROTHIAZIDE 12.5; 5 MG/1; MG/1
1 TABLET ORAL DAILY
Qty: 90 TABLET | Refills: 2 | Status: SHIPPED | OUTPATIENT
Start: 2025-05-28

## 2025-05-28 ASSESSMENT — ANXIETY QUESTIONNAIRES
3. WORRYING TOO MUCH ABOUT DIFFERENT THINGS: SEVERAL DAYS
7. FEELING AFRAID AS IF SOMETHING AWFUL MIGHT HAPPEN: NOT AT ALL
7. FEELING AFRAID AS IF SOMETHING AWFUL MIGHT HAPPEN: NOT AT ALL
4. TROUBLE RELAXING: NOT AT ALL
6. BECOMING EASILY ANNOYED OR IRRITABLE: SEVERAL DAYS
5. BEING SO RESTLESS THAT IT IS HARD TO SIT STILL: NOT AT ALL
GAD7 TOTAL SCORE: 3
2. NOT BEING ABLE TO STOP OR CONTROL WORRYING: NOT AT ALL
GAD7 TOTAL SCORE: 3
1. FEELING NERVOUS, ANXIOUS, OR ON EDGE: SEVERAL DAYS
GAD7 TOTAL SCORE: 3
8. IF YOU CHECKED OFF ANY PROBLEMS, HOW DIFFICULT HAVE THESE MADE IT FOR YOU TO DO YOUR WORK, TAKE CARE OF THINGS AT HOME, OR GET ALONG WITH OTHER PEOPLE?: SOMEWHAT DIFFICULT
IF YOU CHECKED OFF ANY PROBLEMS ON THIS QUESTIONNAIRE, HOW DIFFICULT HAVE THESE PROBLEMS MADE IT FOR YOU TO DO YOUR WORK, TAKE CARE OF THINGS AT HOME, OR GET ALONG WITH OTHER PEOPLE: SOMEWHAT DIFFICULT

## 2025-05-28 ASSESSMENT — PAIN SCALES - GENERAL: PAINLEVEL_OUTOF10: NO PAIN (0)

## 2025-05-28 NOTE — PROGRESS NOTES
Assessment & Plan   1. Atypical chest pain  Hx of untreated HTN. Now controlled. ASCVD risk high enough to warrant statin. LDL already in the 80s. Will start 20 mg and attempt to get below 70. Referral given to cardiology, but doubt they would recommend stent placement without characteristic angina and normal stress test. Wife and daughter want him to see cardiology. Will also check for other risk factors as below. Work on prediabetes. Does not use tobacco.  - Adult Cardiology Eval  Referral; Future  - Apolipoprotein B; Future  - Lipoprotein (a); Future  - atorvastatin (LIPITOR) 20 MG tablet; Take 1 tablet (20 mg) by mouth daily.  Dispense: 90 tablet; Refill: 3  - General PFT Lab (Please always keep checked); Future  - Apolipoprotein B  - Lipoprotein (a)    2. Benign essential hypertension  Symptoms stable/controlled. Medication monitoring labs ordered/reviewed. Tolerating pharmacotherapy well. Continue current regiment. Medication refilled per orders as below.   - losartan-hydrochlorothiazide (HYZAAR) 50-12.5 MG tablet; Take 1 tablet by mouth daily.  Dispense: 90 tablet; Refill: 2    3. Hypothyroidism, unspecified type (Primary)  Refills igven.  - levothyroxine (SYNTHROID/LEVOTHROID) 112 MCG tablet; Take 1 tablet (112 mcg) by mouth daily.  Dispense: 90 tablet; Refill: 2          Rajinder Hernández MD  [facility]    Disclaimer: This note consists of symbols derived from keyboarding, dictation and/or voice recognition software. As a result, there may be errors in the script that have gone undetected. Please consider this when interpreting information found in this chart.    The longitudinal plan of care for the diagnosis(es)/condition(s) as documented were addressed during this visit. Due to the added complexity in care, I will continue to support Martin in the subsequent management and with ongoing continuity of care.    Naseem Salazar is a 69 year old, presenting for  "the following health issues:  No chief complaint on file.        5/28/2025     3:47 PM   Additional Questions   Roomed by BEATA Crowe   Accompanied by Self     History of Present Illness       Hypertension: He presents for follow up of hypertension.  He does check blood pressure  regularly outside of the clinic. Outpatient blood pressures have not been over 140/90. He does not follow a low salt diet.     Hypothyroidism:     Since last visit, patient describes the following symptoms::  Fatigue, Anxiety and Depression    Weight gain::  No weight gain    Weight loss::  No weight loss    Vascular Disease:  He presents for follow up of vascular disease.     He never takes nitroglycerin. He is not taking daily aspirin.    Reason for visit:  Heart    He eats 0-1 servings of fruits and vegetables daily.He consumes 2 sweetened beverage(s) daily.He exercises with enough effort to increase his heart rate 10 to 19 minutes per day.  He exercises with enough effort to increase his heart rate 4 days per week.   He is taking medications regularly.      Patient is here today to discuss the results and findings of his Chest CT scan and his stress test. Which showed a small nodule and was recommended to recheck in one year, but did show severe coronary artery calcification. The stress test was normal. Patient wasn't sure if this is something that he can take medication for but wanted to have a discussion with PCP face to face.     Review of Systems  Constitutional, HEENT, cardiovascular, pulmonary, GI, , musculoskeletal, neuro, skin, endocrine and psych systems are negative, except as otherwise noted.      Objective    /72   Pulse 76   Temp 97.8  F (36.6  C) (Temporal)   Resp 12   Ht 1.715 m (5' 7.52\")   Wt 81 kg (178 lb 8 oz)   SpO2 96%   BMI 27.53 kg/m    Body mass index is 27.53 kg/m .  Physical Exam  Vitals reviewed.   HENT:      Head: Normocephalic and atraumatic.   Eyes:      General:         Right eye: No " discharge.         Left eye: No discharge.      Extraocular Movements: Extraocular movements intact.      Conjunctiva/sclera: Conjunctivae normal.      Pupils: Pupils are equal, round, and reactive to light.   Cardiovascular:      Rate and Rhythm: Normal rate and regular rhythm.      Heart sounds: Normal heart sounds. No murmur heard.     No friction rub.   Pulmonary:      Effort: Pulmonary effort is normal. No respiratory distress.      Breath sounds: Normal breath sounds. No wheezing or rhonchi.   Musculoskeletal:         General: No swelling.      Cervical back: Normal range of motion and neck supple. No tenderness.   Lymphadenopathy:      Cervical: No cervical adenopathy.   Skin:     General: Skin is warm.      Findings: No rash.   Neurological:      General: No focal deficit present.      Mental Status: He is alert.      Motor: No weakness.      Coordination: Coordination normal.      Gait: Gait normal.   Psychiatric:         Mood and Affect: Mood normal.         Behavior: Behavior normal.         Thought Content: Thought content normal.         Judgment: Judgment normal.        Labs: Pending        Signed Electronically by: Rajinder Hernández MD

## 2025-05-29 ENCOUNTER — PATIENT OUTREACH (OUTPATIENT)
Dept: CARE COORDINATION | Facility: CLINIC | Age: 70
End: 2025-05-29
Payer: COMMERCIAL

## 2025-05-31 LAB — APO B100 SERPL-MCNC: 69 MG/DL

## 2025-06-02 ENCOUNTER — DOCUMENTATION ONLY (OUTPATIENT)
Dept: LAB | Facility: CLINIC | Age: 70
End: 2025-06-02
Payer: COMMERCIAL

## 2025-06-02 ENCOUNTER — PATIENT OUTREACH (OUTPATIENT)
Dept: CARE COORDINATION | Facility: CLINIC | Age: 70
End: 2025-06-02
Payer: COMMERCIAL

## 2025-06-02 ENCOUNTER — RESULTS FOLLOW-UP (OUTPATIENT)
Dept: FAMILY MEDICINE | Facility: CLINIC | Age: 70
End: 2025-06-02

## 2025-06-02 DIAGNOSIS — R07.89 ATYPICAL CHEST PAIN: Primary | ICD-10-CM

## 2025-06-02 NOTE — PROGRESS NOTES
One of the tests needed to be canceled due to sample stability and unforseen analyzer issues. Patient has been contacted through [employer] and reached out to by phone with no answer. Follow up will be done to make sure patient is aware of redraw.     Lipoprotein a    Thank you,     Staci MENDOZA MLS

## 2025-06-09 ENCOUNTER — TELEPHONE (OUTPATIENT)
Dept: CARDIOLOGY | Facility: CLINIC | Age: 70
End: 2025-06-09
Payer: COMMERCIAL

## 2025-06-09 NOTE — TELEPHONE ENCOUNTER
This encounter is being sent to inform the clinic that this patient has a referral from Dr. Merchant for the diagnoses of Severe coronary calcified atherosclerosis  / Atypical Chest Pain and has requested that this patient be seen within 30 days and/or with .  Based on the availability of our provider(s), we are unable to accommodate this request.    Were all sites offered this patient?  Yes    Does scheduling algorithm request to schedule next available?  Patient has been scheduled for the first available opening with Dr. Eng on 8/74.  We have informed the patient that the clinic will review their referral and reach out if a sooner appointment is medically necessary.

## 2025-06-09 NOTE — TELEPHONE ENCOUNTER
Patient added to waitlist.  .  María Luevano, RN, BSN  Cardiology RN Care Coordinator   [place]   Phone: [phone]  Fax: [phone] ([place]) [phone] ([place])

## 2025-08-07 ENCOUNTER — OFFICE VISIT (OUTPATIENT)
Dept: CARDIOLOGY | Facility: CLINIC | Age: 70
End: 2025-08-07
Payer: COMMERCIAL

## 2025-08-07 VITALS
DIASTOLIC BLOOD PRESSURE: 76 MMHG | OXYGEN SATURATION: 97 % | HEART RATE: 83 BPM | WEIGHT: 175 LBS | SYSTOLIC BLOOD PRESSURE: 123 MMHG | BODY MASS INDEX: 26.99 KG/M2

## 2025-08-07 DIAGNOSIS — R07.89 ATYPICAL CHEST PAIN: ICD-10-CM

## 2025-08-07 DIAGNOSIS — I25.10 CORONARY ARTERY CALCIFICATION: Primary | ICD-10-CM

## (undated) DEVICE — LUBRICATING JELLY 4.25OZ

## (undated) DEVICE — ENDO FORCEP ENDOJAW BIOPSY 2.8MMX230CM FB-220U

## (undated) DEVICE — TUBING SUCTION 12"X1/4" N612

## (undated) DEVICE — SOL WATER IRRIG 1000ML BOTTLE 07139-09

## (undated) DEVICE — KIT ENDO TURNOVER/PROCEDURE CARRY-ON 101822

## (undated) RX ORDER — LIDOCAINE HYDROCHLORIDE 10 MG/ML
INJECTION, SOLUTION EPIDURAL; INFILTRATION; INTRACAUDAL; PERINEURAL
Status: DISPENSED
Start: 2024-07-31

## (undated) RX ORDER — PROPOFOL 10 MG/ML
INJECTION, EMULSION INTRAVENOUS
Status: DISPENSED
Start: 2024-07-31

## (undated) RX ORDER — FENTANYL CITRATE 50 UG/ML
INJECTION, SOLUTION INTRAMUSCULAR; INTRAVENOUS
Status: DISPENSED
Start: 2018-11-05

## (undated) RX ORDER — SIMETHICONE 40MG/0.6ML
SUSPENSION, DROPS(FINAL DOSAGE FORM)(ML) ORAL
Status: DISPENSED
Start: 2018-11-05